# Patient Record
Sex: FEMALE | Race: WHITE | NOT HISPANIC OR LATINO | Employment: OTHER | URBAN - METROPOLITAN AREA
[De-identification: names, ages, dates, MRNs, and addresses within clinical notes are randomized per-mention and may not be internally consistent; named-entity substitution may affect disease eponyms.]

---

## 2017-03-03 LAB
ALBUMIN SERPL-MCNC: 4.5 G/DL (ref 3.6–5.1)
ALBUMIN/CREAT UR: 10 MCG/MG CREAT
ALBUMIN/GLOB SERPL: 1.9 (CALC) (ref 1–2.5)
ALP SERPL-CCNC: 63 U/L (ref 33–130)
ALT SERPL-CCNC: 16 U/L (ref 6–29)
AST SERPL-CCNC: 16 U/L (ref 10–35)
BILIRUB DIRECT SERPL-MCNC: 0.1 MG/DL
BILIRUB INDIRECT SERPL-MCNC: 0.3 MG/DL (CALC) (ref 0.2–1.2)
BILIRUB SERPL-MCNC: 0.4 MG/DL (ref 0.2–1.2)
CALCIUM SERPL-MCNC: 9.7 MG/DL (ref 8.6–10.4)
CHOLEST SERPL-MCNC: 200 MG/DL (ref 125–200)
CHOLEST/HDLC SERPL: 2.5 (CALC)
CREAT UR-MCNC: 201 MG/DL (ref 20–320)
GLOBULIN SER CALC-MCNC: 2.4 G/DL (CALC) (ref 1.9–3.7)
GLUCOSE P FAST SERPL-MCNC: 106 MG/DL (ref 65–99)
HBA1C MFR BLD: 5.8 % OF TOTAL HGB
HDLC SERPL-MCNC: 81 MG/DL
LDLC SERPL CALC-MCNC: 97 MG/DL (CALC)
MICROALBUMIN UR-MCNC: 2.1 MG/DL
NONHDLC SERPL-MCNC: 119 MG/DL (CALC)
PHOSPHATE SERPL-MCNC: 3.6 MG/DL (ref 2.1–4.3)
PROT SERPL-MCNC: 6.9 G/DL (ref 6.1–8.1)
PTH-INTACT SERPL-MCNC: 45 PG/ML (ref 14–64)
TRIGL SERPL-MCNC: 111 MG/DL

## 2017-04-27 ENCOUNTER — CONVERSION ENCOUNTER (OUTPATIENT)
Dept: MAMMOGRAPHY | Facility: CLINIC | Age: 77
End: 2017-04-27

## 2017-05-27 LAB
ALT SERPL-CCNC: 21 U/L (ref 6–29)
AST SERPL-CCNC: 22 U/L (ref 10–35)
CHOLEST SERPL-MCNC: 164 MG/DL (ref 125–200)
CHOLEST/HDLC SERPL: 1.9 (CALC)
HDLC SERPL-MCNC: 85 MG/DL
LDLC SERPL CALC-MCNC: 60 MG/DL (CALC)
NONHDLC SERPL-MCNC: 79 MG/DL (CALC)
TRIGL SERPL-MCNC: 96 MG/DL

## 2017-09-08 LAB
BUN SERPL-MCNC: 23 MG/DL (ref 7–25)
BUN/CREAT SERPL: 24 (CALC) (ref 6–22)
CALCIUM SERPL-MCNC: 9.7 MG/DL (ref 8.6–10.4)
CHLORIDE SERPL-SCNC: 102 MMOL/L (ref 98–110)
CO2 SERPL-SCNC: 29 MMOL/L (ref 20–31)
CREAT SERPL-MCNC: 0.95 MG/DL (ref 0.6–0.93)
GLUCOSE SERPL-MCNC: 100 MG/DL (ref 65–99)
HBA1C MFR BLD: 5.6 % OF TOTAL HGB
POTASSIUM SERPL-SCNC: 4.5 MMOL/L (ref 3.5–5.3)
SL AMB EGFR AFRICAN AMERICAN: 68 ML/MIN/1.73M2
SL AMB EGFR NON AFRICAN AMERICAN: 59 ML/MIN/1.73M2
SODIUM SERPL-SCNC: 139 MMOL/L (ref 135–146)

## 2017-12-01 LAB
ALBUMIN SERPL-MCNC: 4.3 G/DL (ref 3.6–5.1)
ALBUMIN/CREAT UR: 7 MCG/MG CREAT
ALBUMIN/GLOB SERPL: 1.9 (CALC) (ref 1–2.5)
ALP SERPL-CCNC: 59 U/L (ref 33–130)
ALT SERPL-CCNC: 19 U/L (ref 6–29)
APPEARANCE UR: CLEAR
AST SERPL-CCNC: 16 U/L (ref 10–35)
BILIRUB DIRECT SERPL-MCNC: 0.1 MG/DL
BILIRUB INDIRECT SERPL-MCNC: 0.4 MG/DL (CALC) (ref 0.2–1.2)
BILIRUB SERPL-MCNC: 0.5 MG/DL (ref 0.2–1.2)
BILIRUB UR QL STRIP: NEGATIVE
BUN SERPL-MCNC: 22 MG/DL (ref 7–25)
BUN/CREAT SERPL: ABNORMAL (CALC) (ref 6–22)
CALCIUM SERPL-MCNC: 9.4 MG/DL (ref 8.6–10.4)
CALCIUM SERPL-MCNC: 9.4 MG/DL (ref 8.6–10.4)
CHLORIDE SERPL-SCNC: 103 MMOL/L (ref 98–110)
CHOLEST SERPL-MCNC: 158 MG/DL
CHOLEST/HDLC SERPL: 2.1 (CALC)
CO2 SERPL-SCNC: 25 MMOL/L (ref 20–31)
COLOR UR: YELLOW
CREAT SERPL-MCNC: 0.93 MG/DL (ref 0.6–0.93)
CREAT UR-MCNC: 207 MG/DL (ref 20–320)
GLOBULIN SER CALC-MCNC: 2.3 G/DL (CALC) (ref 1.9–3.7)
GLUCOSE SERPL-MCNC: 104 MG/DL (ref 65–99)
GLUCOSE UR QL STRIP: NEGATIVE
HDLC SERPL-MCNC: 74 MG/DL
HGB UR QL STRIP: NEGATIVE
KETONES UR QL STRIP: NEGATIVE
LDLC SERPL CALC-MCNC: 65 MG/DL (CALC)
LEUKOCYTE ESTERASE UR QL STRIP: NEGATIVE
MAGNESIUM SERPL-MCNC: 2 MG/DL (ref 1.5–2.5)
MICROALBUMIN UR-MCNC: 1.5 MG/DL
NITRITE UR QL STRIP: NEGATIVE
NONHDLC SERPL-MCNC: 84 MG/DL (CALC)
PH UR STRIP: 5.5 [PH] (ref 5–8)
PHOSPHATE SERPL-MCNC: 3.5 MG/DL (ref 2.1–4.3)
POTASSIUM SERPL-SCNC: 4.5 MMOL/L (ref 3.5–5.3)
PROT SERPL-MCNC: 6.6 G/DL (ref 6.1–8.1)
PROT UR QL STRIP: NEGATIVE
PTH-INTACT SERPL-MCNC: 46 PG/ML (ref 14–64)
SERVICE CMNT-IMP: NORMAL
SL AMB EGFR AFRICAN AMERICAN: 69 ML/MIN/1.73M2
SL AMB EGFR NON AFRICAN AMERICAN: 60 ML/MIN/1.73M2
SODIUM SERPL-SCNC: 137 MMOL/L (ref 135–146)
SP GR UR STRIP: 1.02 (ref 1–1.03)
TRIGL SERPL-MCNC: 108 MG/DL

## 2018-04-14 LAB
EST. AVERAGE GLUCOSE BLD GHB EST-MCNC: 117 (CALC)
EST. AVERAGE GLUCOSE BLD GHB EST-SCNC: 6.5 (CALC)
GLUCOSE P FAST SERPL-MCNC: 103 MG/DL (ref 65–99)
HBA1C MFR BLD: 5.7 % OF TOTAL HGB

## 2018-05-09 ENCOUNTER — CONVERSION ENCOUNTER (OUTPATIENT)
Dept: MAMMOGRAPHY | Facility: CLINIC | Age: 78
End: 2018-05-09

## 2018-07-14 LAB
ALBUMIN SERPL-MCNC: 4.5 G/DL (ref 3.6–5.1)
ALBUMIN/GLOB SERPL: 2 (CALC) (ref 1–2.5)
ALP SERPL-CCNC: 56 U/L (ref 33–130)
ALT SERPL-CCNC: 15 U/L (ref 6–29)
AST SERPL-CCNC: 13 U/L (ref 10–35)
BASOPHILS # BLD AUTO: 50 CELLS/UL (ref 0–200)
BASOPHILS NFR BLD AUTO: 1.2 %
BILIRUB DIRECT SERPL-MCNC: 0.1 MG/DL
BILIRUB INDIRECT SERPL-MCNC: 0.3 MG/DL (CALC) (ref 0.2–1.2)
BILIRUB SERPL-MCNC: 0.4 MG/DL (ref 0.2–1.2)
BUN SERPL-MCNC: 31 MG/DL (ref 7–25)
BUN/CREAT SERPL: 33 (CALC) (ref 6–22)
CALCIUM SERPL-MCNC: 9.1 MG/DL (ref 8.6–10.4)
CHLORIDE SERPL-SCNC: 106 MMOL/L (ref 98–110)
CHOLEST SERPL-MCNC: 148 MG/DL
CHOLEST/HDLC SERPL: 1.9 (CALC)
CO2 SERPL-SCNC: 26 MMOL/L (ref 20–31)
CREAT SERPL-MCNC: 0.94 MG/DL (ref 0.6–0.93)
EOSINOPHIL # BLD AUTO: 139 CELLS/UL (ref 15–500)
EOSINOPHIL NFR BLD AUTO: 3.3 %
ERYTHROCYTE [DISTWIDTH] IN BLOOD BY AUTOMATED COUNT: 12.8 % (ref 11–15)
GLOBULIN SER CALC-MCNC: 2.2 G/DL (CALC) (ref 1.9–3.7)
GLUCOSE SERPL-MCNC: 112 MG/DL (ref 65–99)
HCT VFR BLD AUTO: 37.6 % (ref 35–45)
HDLC SERPL-MCNC: 76 MG/DL
HGB BLD-MCNC: 12.5 G/DL (ref 11.7–15.5)
LDLC SERPL CALC-MCNC: 52 MG/DL (CALC)
LYMPHOCYTES # BLD AUTO: 1000 CELLS/UL (ref 850–3900)
LYMPHOCYTES NFR BLD AUTO: 23.8 %
MCH RBC QN AUTO: 31.3 PG (ref 27–33)
MCHC RBC AUTO-ENTMCNC: 33.2 G/DL (ref 32–36)
MCV RBC AUTO: 94 FL (ref 80–100)
MONOCYTES # BLD AUTO: 462 CELLS/UL (ref 200–950)
MONOCYTES NFR BLD AUTO: 11 %
NEUTROPHILS # BLD AUTO: 2549 CELLS/UL (ref 1500–7800)
NEUTROPHILS NFR BLD AUTO: 60.7 %
NONHDLC SERPL-MCNC: 72 MG/DL (CALC)
PLATELET # BLD AUTO: 297 THOUSAND/UL (ref 140–400)
PMV BLD REES-ECKER: 10.5 FL (ref 7.5–12.5)
POTASSIUM SERPL-SCNC: 4.4 MMOL/L (ref 3.5–5.3)
PROT SERPL-MCNC: 6.7 G/DL (ref 6.1–8.1)
RBC # BLD AUTO: 4 MILLION/UL (ref 3.8–5.1)
SL AMB EGFR AFRICAN AMERICAN: 68 ML/MIN/1.73M2
SL AMB EGFR NON AFRICAN AMERICAN: 59 ML/MIN/1.73M2
SODIUM SERPL-SCNC: 139 MMOL/L (ref 135–146)
TRIGL SERPL-MCNC: 110 MG/DL
WBC # BLD AUTO: 4.2 THOUSAND/UL (ref 3.8–10.8)

## 2018-07-24 ENCOUNTER — OFFICE VISIT (OUTPATIENT)
Dept: FAMILY MEDICINE CLINIC | Facility: CLINIC | Age: 78
End: 2018-07-24
Payer: MEDICARE

## 2018-07-24 VITALS
DIASTOLIC BLOOD PRESSURE: 80 MMHG | OXYGEN SATURATION: 98 % | SYSTOLIC BLOOD PRESSURE: 120 MMHG | WEIGHT: 180.6 LBS | RESPIRATION RATE: 20 BRPM | HEIGHT: 65 IN | BODY MASS INDEX: 30.09 KG/M2 | HEART RATE: 90 BPM | TEMPERATURE: 97.7 F

## 2018-07-24 DIAGNOSIS — R09.89 WEAK ARTERIAL PULSE: ICD-10-CM

## 2018-07-24 DIAGNOSIS — N18.9 CHRONIC RENAL IMPAIRMENT, UNSPECIFIED CKD STAGE: ICD-10-CM

## 2018-07-24 DIAGNOSIS — E78.00 PURE HYPERCHOLESTEROLEMIA: ICD-10-CM

## 2018-07-24 DIAGNOSIS — M81.0 OSTEOPOROSIS WITHOUT CURRENT PATHOLOGICAL FRACTURE, UNSPECIFIED OSTEOPOROSIS TYPE: ICD-10-CM

## 2018-07-24 DIAGNOSIS — K76.89 LIVER CYST: Primary | ICD-10-CM

## 2018-07-24 DIAGNOSIS — N28.1 CYST OF RIGHT KIDNEY: ICD-10-CM

## 2018-07-24 PROBLEM — K76.0 FATTY LIVER: Status: ACTIVE | Noted: 2018-07-24

## 2018-07-24 PROBLEM — H40.9 GLAUCOMA: Status: ACTIVE | Noted: 2018-07-24

## 2018-07-24 PROBLEM — K63.5 COLONIC POLYP: Status: ACTIVE | Noted: 2018-07-24

## 2018-07-24 PROBLEM — K21.00 GERD WITH ESOPHAGITIS: Status: ACTIVE | Noted: 2018-07-24

## 2018-07-24 PROBLEM — H26.9 CATARACT: Status: ACTIVE | Noted: 2018-07-24

## 2018-07-24 PROBLEM — K57.90 DIVERTICULOSIS: Status: ACTIVE | Noted: 2018-07-24

## 2018-07-24 PROBLEM — E78.5 HYPERLIPIDEMIA: Status: ACTIVE | Noted: 2018-07-24

## 2018-07-24 PROBLEM — E11.9 DIABETES (HCC): Status: ACTIVE | Noted: 2018-07-24

## 2018-07-24 PROBLEM — I73.9 PERIPHERAL VASCULAR DISEASE OF LOWER EXTREMITY (HCC): Status: ACTIVE | Noted: 2018-07-24

## 2018-07-24 PROBLEM — H91.90 HEARING LOSS: Status: ACTIVE | Noted: 2018-07-24

## 2018-07-24 PROCEDURE — 99214 OFFICE O/P EST MOD 30 MIN: CPT | Performed by: FAMILY MEDICINE

## 2018-07-24 RX ORDER — ROSUVASTATIN CALCIUM 10 MG/1
10 TABLET, COATED ORAL DAILY
COMMUNITY
End: 2018-08-23 | Stop reason: SDUPTHER

## 2018-07-24 RX ORDER — DILTIAZEM HYDROCHLORIDE EXTENDED-RELEASE TABLETS 180 MG/1
1 TABLET, EXTENDED RELEASE ORAL DAILY
COMMUNITY
End: 2018-10-10 | Stop reason: SDUPTHER

## 2018-07-24 RX ORDER — OLMESARTAN MEDOXOMIL 20 MG/1
1 TABLET ORAL DAILY
COMMUNITY
End: 2019-01-24 | Stop reason: SDUPTHER

## 2018-07-24 RX ORDER — ASPIRIN 81 MG/1
81 TABLET ORAL DAILY
COMMUNITY
End: 2019-10-02 | Stop reason: SDUPTHER

## 2018-07-24 RX ORDER — EZETIMIBE 10 MG/1
10 TABLET ORAL DAILY
COMMUNITY
End: 2018-08-23 | Stop reason: SDUPTHER

## 2018-07-24 NOTE — PROGRESS NOTES
Assessment/Plan:      Diagnoses and all orders for this visit:    Liver cyst  -     US liver; Future    Osteoporosis without current pathological fracture, unspecified osteoporosis type  Comments:   fall prevention discussed  Calcium and vitamin-D supplement  discussed  Orders:  -     Vitamin D 25 hydroxy; Future    Chronic renal impairment, unspecified CKD stage  Comments:   advised patient to hydrate herself well  Avoid NSAID  Recommend to see Nephrology patient declined  Orders:  -     Basic metabolic panel; Future  -     PTH, intact; Future  -     Phosphorus; Future  -     US retroperitoneal complete; Future    Weak arterial pulse  -     VAS lower limb arterial duplex, complete bilateral; Future    Cyst of right kidney  -     US retroperitoneal complete; Future    Pure hypercholesterolemia  Comments: To follow with low-fat diet    Other orders  -     olmesartan (BENICAR) 20 mg tablet; Take 1 tablet by mouth daily  -     diltiazem (CARDIZEM LA) 180 MG 24 hr tablet; Take 1 tablet by mouth daily  -     brimonidine (ALPHAGAN P) 0 1 %; Apply 1 drop to eye every 12 (twelve) hours  -     bimatoprost (LUMIGAN) 0 01 % ophthalmic drops; Apply 1 drop to eye Daily  -     Multiple Vitamins-Minerals (CENTRUM SILVER ULTRA WOMENS PO); Take 1 tablet by mouth daily  -     Calcium Carbonate-Vitamin D (CALTRATE 600+D PO); Take 1 tablet by mouth daily  -     rosuvastatin (CRESTOR) 10 MG tablet; Take 10 mg by mouth daily  -     ezetimibe (ZETIA) 10 mg tablet; Take 10 mg by mouth daily  -     aspirin (ECOTRIN LOW STRENGTH) 81 mg EC tablet; Take 81 mg by mouth daily          Subjective:     Patient ID: Polo Cuello is a 69 y o  female    Diabetes  Patient does not check blood sugar at home  Denied polyuria or polydipsia  She saw her Ophthalmology recently and she has a slight cataracts,  Patient follow with Podiatry for foot    Hypertension    Denied headache/dizziness take medication without side effect , admit to regular salt intake   abnormal arterial Doppler of the lower extremities   she denied claudication  liver cyst   Denied abdominal pain   chronic renal failure  Patient denied edema problem with urination she said she does not drink enough fluid        Tests results , labs  7-13-18 , mammogramdiscussed with pt     medication  Allergies  Social history reviewed at this office        Review of Systems   Constitutional: Negative for activity change, appetite change, chills, fatigue, fever and unexpected weight change  HENT: Negative for congestion, ear pain, sinus pressure and sore throat  Eyes: Negative for visual disturbance  Respiratory: Negative for cough, chest tightness, shortness of breath and wheezing  Cardiovascular: Negative for chest pain, palpitations and leg swelling  Gastrointestinal: Negative for abdominal pain, blood in stool, constipation, diarrhea, nausea and vomiting  Genitourinary: Negative for dysuria, frequency, hematuria and urgency  Musculoskeletal: Negative for arthralgias, back pain, gait problem, joint swelling, myalgias and neck pain  Skin: Negative for rash  Neurological: Negative for dizziness, tremors, seizures, syncope, weakness, light-headedness and headaches  Hematological: Negative for adenopathy  Does not bruise/bleed easily  Psychiatric/Behavioral: Negative for behavioral problems, confusion, dysphoric mood and sleep disturbance  Objective:     Physical Exam   Constitutional: She is oriented to person, place, and time  She appears well-developed and well-nourished  HENT:   Head: Normocephalic  Eyes: No scleral icterus  Neck: Neck supple  No JVD present  No thyromegaly present  Cardiovascular: Normal rate and regular rhythm  No murmur heard  Pulses:       Dorsalis pedis pulses are 1+ on the right side, and 1+ on the left side  Posterior tibial pulses are 1+ on the right side, and 1+ on the left side     Carotid pulses are normal bilaterally   Pulmonary/Chest: Effort normal and breath sounds normal    Abdominal: Soft  Bowel sounds are normal  She exhibits no distension and no mass  There is no tenderness  There is no rebound and no guarding  Musculoskeletal: She exhibits no edema or tenderness  Feet:   Right Foot:   Skin Integrity: Positive for callus  Negative for ulcer, skin breakdown, warmth or dry skin  Left Foot:   Skin Integrity: Negative for skin breakdown, erythema, warmth or dry skin  Lymphadenopathy:     She has no cervical adenopathy  Neurological: She is alert and oriented to person, place, and time  No cranial nerve deficit  She exhibits normal muscle tone  Skin: No rash noted  No erythema  No pallor  Psychiatric: She has a normal mood and affect  Her behavior is normal  Judgment and thought content normal      Right Foot/Ankle   Right Foot Inspection  Skin Exam: callus and callus skin not intact, no dry skin, no warmth, no maceration, no abnormal color, no pre-ulcer and no ulcer                          Toe Exam: no swelling, no tenderness and erythema  Sensory       Monofilament testing: intact  Vascular    The right DP pulse is 1+  The right PT pulse is 1+  Left Foot/Ankle  Left Foot Inspection  Skin Exam: skin not intact, no dry skin, no warmth, no erythema, no maceration, normal color and no pre-ulcer                         Toe Exam: left toe deformityno swelling and no tenderness                   Sensory       Monofilament: intact  Vascular    The left DP pulse is 1+  The left PT pulse is 1+

## 2018-08-23 DIAGNOSIS — E78.5 HYPERLIPIDEMIA, UNSPECIFIED HYPERLIPIDEMIA TYPE: Primary | ICD-10-CM

## 2018-08-23 RX ORDER — EZETIMIBE 10 MG/1
10 TABLET ORAL DAILY
Qty: 90 TABLET | Refills: 0 | Status: SHIPPED | OUTPATIENT
Start: 2018-08-23 | End: 2018-10-25 | Stop reason: SDUPTHER

## 2018-08-23 RX ORDER — ROSUVASTATIN CALCIUM 10 MG/1
10 TABLET, COATED ORAL DAILY
Qty: 90 TABLET | Refills: 0 | Status: SHIPPED | OUTPATIENT
Start: 2018-08-23 | End: 2018-10-25 | Stop reason: SDUPTHER

## 2018-08-23 NOTE — TELEPHONE ENCOUNTER
Patient states that she going down to [place] and needs the medication to be sent ASAP for her to take with her to [place]

## 2018-09-13 ENCOUNTER — HOSPITAL ENCOUNTER (OUTPATIENT)
Dept: NON INVASIVE DIAGNOSTICS | Facility: HOSPITAL | Age: 78
Discharge: HOME | End: 2018-09-13
Payer: MEDICARE

## 2018-09-13 ENCOUNTER — HOSPITAL ENCOUNTER (OUTPATIENT)
Dept: ULTRASOUND IMAGING | Facility: HOSPITAL | Age: 78
Discharge: HOME | End: 2018-09-13
Payer: MEDICARE

## 2018-09-13 DIAGNOSIS — R09.89 WEAK ARTERIAL PULSE: ICD-10-CM

## 2018-09-13 DIAGNOSIS — K76.89 LIVER CYST: ICD-10-CM

## 2018-09-13 PROCEDURE — 93925 LOWER EXTREMITY STUDY: CPT | Performed by: SURGERY

## 2018-09-13 PROCEDURE — 93922 UPR/L XTREMITY ART 2 LEVELS: CPT | Performed by: SURGERY

## 2018-09-13 PROCEDURE — 93925 LOWER EXTREMITY STUDY: CPT

## 2018-09-13 PROCEDURE — 93923 UPR/LXTR ART STDY 3+ LVLS: CPT

## 2018-09-13 PROCEDURE — 76705 ECHO EXAM OF ABDOMEN: CPT

## 2018-10-10 DIAGNOSIS — I10 ESSENTIAL HYPERTENSION: Primary | ICD-10-CM

## 2018-10-10 RX ORDER — DILTIAZEM HYDROCHLORIDE EXTENDED-RELEASE TABLETS 180 MG/1
180 TABLET, EXTENDED RELEASE ORAL DAILY
Qty: 90 TABLET | Refills: 1 | Status: SHIPPED | OUTPATIENT
Start: 2018-10-10 | End: 2018-10-25

## 2018-10-19 LAB
25(OH)D3 SERPL-MCNC: 36 NG/ML (ref 30–100)
BUN SERPL-MCNC: 33 MG/DL (ref 7–25)
BUN/CREAT SERPL: 36 (CALC) (ref 6–22)
CALCIUM SERPL-MCNC: 9.1 MG/DL (ref 8.6–10.4)
CALCIUM SERPL-MCNC: 9.1 MG/DL (ref 8.6–10.4)
CHLORIDE SERPL-SCNC: 101 MMOL/L (ref 98–110)
CO2 SERPL-SCNC: 28 MMOL/L (ref 20–32)
CREAT SERPL-MCNC: 0.92 MG/DL (ref 0.6–0.93)
GLUCOSE SERPL-MCNC: 107 MG/DL (ref 65–99)
PHOSPHATE SERPL-MCNC: 3.8 MG/DL (ref 2.1–4.3)
POTASSIUM SERPL-SCNC: 4.4 MMOL/L (ref 3.5–5.3)
PTH-INTACT SERPL-MCNC: 63 PG/ML (ref 14–64)
SL AMB EGFR AFRICAN AMERICAN: 70 ML/MIN/1.73M2
SL AMB EGFR NON AFRICAN AMERICAN: 60 ML/MIN/1.73M2
SODIUM SERPL-SCNC: 137 MMOL/L (ref 135–146)

## 2018-10-25 ENCOUNTER — OFFICE VISIT (OUTPATIENT)
Dept: FAMILY MEDICINE CLINIC | Facility: CLINIC | Age: 78
End: 2018-10-25
Payer: MEDICARE

## 2018-10-25 VITALS
HEART RATE: 90 BPM | OXYGEN SATURATION: 99 % | RESPIRATION RATE: 20 BRPM | DIASTOLIC BLOOD PRESSURE: 80 MMHG | WEIGHT: 183.2 LBS | SYSTOLIC BLOOD PRESSURE: 130 MMHG | BODY MASS INDEX: 30.41 KG/M2 | TEMPERATURE: 97.5 F

## 2018-10-25 DIAGNOSIS — E78.5 HYPERLIPIDEMIA, UNSPECIFIED HYPERLIPIDEMIA TYPE: ICD-10-CM

## 2018-10-25 DIAGNOSIS — I10 ESSENTIAL HYPERTENSION: Primary | ICD-10-CM

## 2018-10-25 DIAGNOSIS — Z23 NEED FOR IMMUNIZATION AGAINST INFLUENZA: ICD-10-CM

## 2018-10-25 DIAGNOSIS — K76.89 LIVER CYST: ICD-10-CM

## 2018-10-25 DIAGNOSIS — N18.9 CHRONIC RENAL IMPAIRMENT, UNSPECIFIED CKD STAGE: ICD-10-CM

## 2018-10-25 DIAGNOSIS — N28.1 CYST OF RIGHT KIDNEY: ICD-10-CM

## 2018-10-25 DIAGNOSIS — E78.00 PURE HYPERCHOLESTEROLEMIA: ICD-10-CM

## 2018-10-25 DIAGNOSIS — I73.9 PERIPHERAL VASCULAR DISEASE OF LOWER EXTREMITY (HCC): ICD-10-CM

## 2018-10-25 DIAGNOSIS — E11.69 TYPE 2 DIABETES MELLITUS WITH OTHER SPECIFIED COMPLICATION, WITHOUT LONG-TERM CURRENT USE OF INSULIN (HCC): ICD-10-CM

## 2018-10-25 PROCEDURE — 90662 IIV NO PRSV INCREASED AG IM: CPT | Performed by: FAMILY MEDICINE

## 2018-10-25 PROCEDURE — G0008 ADMIN INFLUENZA VIRUS VAC: HCPCS | Performed by: FAMILY MEDICINE

## 2018-10-25 PROCEDURE — 99214 OFFICE O/P EST MOD 30 MIN: CPT | Performed by: FAMILY MEDICINE

## 2018-10-25 RX ORDER — DILTIAZEM HYDROCHLORIDE 180 MG/1
180 CAPSULE, COATED, EXTENDED RELEASE ORAL DAILY
COMMUNITY
End: 2019-04-03

## 2018-10-25 NOTE — PROGRESS NOTES
Assessment/Plan:     Problem List Items Addressed This Visit     Hyperlipidemia    Relevant Orders    Hepatic function panel    Lipid Panel with Direct LDL reflex    Diabetes ([facility] [address] )    Relevant Orders    Hemoglobin N9W    Basic metabolic panel    Microalbumin / creatinine urine ratio    Chronic renal insufficiency    Peripheral vascular disease of lower extremity (HCC)    Cyst of right kidney    Liver cyst      Other Visit Diagnoses     Essential hypertension    -  Primary     not well controlled  Advised to follow with the dash diet  Relevant Medications    diltiazem (CARDIZEM CD) 180 mg 24 hr capsule    Need for immunization against influenza        Relevant Orders    influenza vaccine, 0299-3427, high-dose, PF 0 5 mL, for patients 65 yr+ (FLUZONE HIGH-DOSE) (Completed)           Diagnoses and all orders for this visit:    Essential hypertension  Comments:   not well controlled  Advised to follow with the dash diet  Pure hypercholesterolemia  Comments: To follow with low-fat diet  Orders:  -     Hepatic function panel; Future  -     Lipid Panel with Direct LDL reflex; Future    Type 2 diabetes mellitus with other specified complication, without long-term current use of insulin (HCC)  Comments: To follow with 1800 calorie diet  To see Podiatry for foot care  Orders:  -     Hemoglobin A1C; Future  -     Basic metabolic panel; Future  -     Microalbumin / creatinine urine ratio    Liver cyst  Comments:   liver cysts  stable    Cyst of right kidney  Comments:   right renal cysts stable    Peripheral vascular disease of lower extremity (HCC)  Comments:   patient to call if she developed claudication  Repeat arterial Doppler of the lower extremities in 1 year    Chronic renal impairment, unspecified CKD stage  Comments:   avoid NSAID    Hyperlipidemia, unspecified hyperlipidemia type  Comments: To follow with low-fat diet  Orders:  -     ezetimibe (ZETIA) 10 mg tablet;  Take 1 tablet (10 mg total) by mouth daily  -     rosuvastatin (CRESTOR) 10 MG tablet; Take 1 tablet (10 mg total) by mouth daily    Need for immunization against influenza  -     influenza vaccine, [id number], high-dose, PF 0 5 mL, for patients 65 yr+ (FLUZONE HIGH-DOSE)    Other orders  -     diltiazem (CARDIZEM CD) 180 mg 24 hr capsule; Take 180 mg by mouth daily            Subjective:     Patient ID: Jenni Gutierrez is a 69 y o  female       Patient is here for follow-up on her chronic medical problem  Diabetes  Denied polyuria or polydipsia  Does not watch her diet  Does not check her blood sugar at home  She did see her Ophthalmology for routine eye care  Hypertension  Admit to regular salt intake  Denied headache or dizziness  Hyperlipidemia admit to regular fat intake  Peripheral vascular disease denied claudication  Chronic renal insufficiency  Denied edema or problem with urination    Test results  Renal ultrasound not done  Liver ultrasound  Arterial Doppler of the lower extremities  Lab done on October 8, 2018   All discussed with patient        Review of Systems   Constitutional: Negative for activity change, appetite change, chills, fatigue, fever and unexpected weight change  HENT: Negative for congestion, ear pain, sinus pressure and sore throat  Eyes: Negative for visual disturbance  Respiratory: Negative for cough, chest tightness, shortness of breath and wheezing  Cardiovascular: Negative for chest pain, palpitations and leg swelling  Gastrointestinal: Negative for abdominal pain, blood in stool, constipation, diarrhea, nausea and vomiting  Endocrine: Negative for cold intolerance and heat intolerance  Genitourinary: Negative for dysuria, frequency, hematuria and urgency  Musculoskeletal: Negative for arthralgias, back pain, gait problem, joint swelling, myalgias and neck pain  Skin: Negative for rash     Neurological: Negative for dizziness, tremors, seizures, syncope, weakness, light-headedness and headaches  Hematological: Negative for adenopathy  Does not bruise/bleed easily  Psychiatric/Behavioral: Negative for behavioral problems, confusion, dysphoric mood and sleep disturbance  Objective:     Physical Exam   Constitutional: She appears well-developed and well-nourished  HENT:   Head: Normocephalic  Eyes: Pupils are equal, round, and reactive to light  No scleral icterus  Cardiovascular: Regular rhythm  Pulses are weak pulses  Pulses:       Dorsalis pedis pulses are 2+ on the right side, and 1+ on the left side  Posterior tibial pulses are 1+ on the right side, and 1+ on the left side  Pulmonary/Chest: Effort normal and breath sounds normal    Abdominal: Bowel sounds are normal  She exhibits no mass  There is no tenderness  Musculoskeletal: She exhibits no edema  Feet:   Right Foot:   Skin Integrity: Positive for callus  Negative for ulcer, skin breakdown, warmth or dry skin  Left Foot:   Skin Integrity: Negative for ulcer, skin breakdown, erythema, warmth, callus or dry skin  Lymphadenopathy:     She has no cervical adenopathy  Neurological: Coordination normal    Skin: No rash noted  Right Foot/Ankle   Right Foot Inspection  Skin Exam: skin normal, callus and callus skin not intact, no dry skin, no warmth, no maceration, no abnormal color, no pre-ulcer and no ulcer                          Toe Exam: no swelling, no tenderness and  no right toe deformity  Sensory   Vibration: intact  Proprioception: intact   Monofilament testing: intact  Vascular    The right DP pulse is 2+  The right PT pulse is 1+  Left Foot/Ankle  Left Foot Inspection  Skin Exam: skin normalskin not intact, no dry skin, no warmth, no erythema, no maceration, normal color, no pre-ulcer, no ulcer and no callus                         Toe Exam: left toe deformityno swelling, no tenderness and no erythema                     Vascular    The left DP pulse is 1+   The left PT pulse is 1+  Assign Risk Category:  Deformity present;  No loss of protective sensation; Weak pulses       Risk: 1

## 2018-10-26 RX ORDER — ROSUVASTATIN CALCIUM 10 MG/1
10 TABLET, COATED ORAL DAILY
Qty: 90 TABLET | Refills: 3 | Status: SHIPPED | OUTPATIENT
Start: 2018-10-26 | End: 2019-07-31 | Stop reason: SDUPTHER

## 2018-10-26 RX ORDER — EZETIMIBE 10 MG/1
10 TABLET ORAL DAILY
Qty: 90 TABLET | Refills: 3 | Status: SHIPPED | OUTPATIENT
Start: 2018-10-26 | End: 2019-07-31 | Stop reason: SDUPTHER

## 2019-01-17 LAB
ALBUMIN SERPL-MCNC: 4.3 G/DL (ref 3.6–5.1)
ALBUMIN/CREAT UR: 11 MCG/MG CREAT
ALBUMIN/GLOB SERPL: 1.7 (CALC) (ref 1–2.5)
ALP SERPL-CCNC: 56 U/L (ref 33–130)
ALT SERPL-CCNC: 16 U/L (ref 6–29)
AST SERPL-CCNC: 14 U/L (ref 10–35)
BILIRUB DIRECT SERPL-MCNC: 0.1 MG/DL
BILIRUB INDIRECT SERPL-MCNC: 0.4 MG/DL (CALC) (ref 0.2–1.2)
BILIRUB SERPL-MCNC: 0.5 MG/DL (ref 0.2–1.2)
BUN SERPL-MCNC: 31 MG/DL (ref 7–25)
BUN/CREAT SERPL: 36 (CALC) (ref 6–22)
CALCIUM SERPL-MCNC: 9.2 MG/DL (ref 8.6–10.4)
CHLORIDE SERPL-SCNC: 103 MMOL/L (ref 98–110)
CHOLEST SERPL-MCNC: 169 MG/DL
CHOLEST/HDLC SERPL: 2.2 (CALC)
CO2 SERPL-SCNC: 26 MMOL/L (ref 20–32)
CREAT SERPL-MCNC: 0.86 MG/DL (ref 0.6–0.93)
CREAT UR-MCNC: 168 MG/DL (ref 20–275)
GLOBULIN SER CALC-MCNC: 2.6 G/DL (CALC) (ref 1.9–3.7)
GLUCOSE SERPL-MCNC: 103 MG/DL (ref 65–99)
HBA1C MFR BLD: 5.7 % OF TOTAL HGB
HDLC SERPL-MCNC: 78 MG/DL
LDLC SERPL CALC-MCNC: 72 MG/DL (CALC)
MICROALBUMIN UR-MCNC: 1.8 MG/DL
NONHDLC SERPL-MCNC: 91 MG/DL (CALC)
POTASSIUM SERPL-SCNC: 4.4 MMOL/L (ref 3.5–5.3)
PROT SERPL-MCNC: 6.9 G/DL (ref 6.1–8.1)
SL AMB EGFR AFRICAN AMERICAN: 76 ML/MIN/1.73M2
SL AMB EGFR NON AFRICAN AMERICAN: 65 ML/MIN/1.73M2
SODIUM SERPL-SCNC: 137 MMOL/L (ref 135–146)
TRIGL SERPL-MCNC: 107 MG/DL

## 2019-01-24 ENCOUNTER — OFFICE VISIT (OUTPATIENT)
Dept: FAMILY MEDICINE CLINIC | Facility: CLINIC | Age: 79
End: 2019-01-24
Payer: MEDICARE

## 2019-01-24 VITALS
HEART RATE: 93 BPM | BODY MASS INDEX: 30.01 KG/M2 | SYSTOLIC BLOOD PRESSURE: 114 MMHG | WEIGHT: 180.8 LBS | DIASTOLIC BLOOD PRESSURE: 80 MMHG | TEMPERATURE: 97.4 F | OXYGEN SATURATION: 98 % | RESPIRATION RATE: 20 BRPM

## 2019-01-24 DIAGNOSIS — E78.5 HYPERLIPIDEMIA, UNSPECIFIED HYPERLIPIDEMIA TYPE: ICD-10-CM

## 2019-01-24 DIAGNOSIS — E11.69 TYPE 2 DIABETES MELLITUS WITH OTHER SPECIFIED COMPLICATION, WITHOUT LONG-TERM CURRENT USE OF INSULIN (HCC): ICD-10-CM

## 2019-01-24 DIAGNOSIS — K76.89 LIVER CYST: ICD-10-CM

## 2019-01-24 DIAGNOSIS — Q61.02 MULTIPLE RENAL CYSTS: ICD-10-CM

## 2019-01-24 DIAGNOSIS — I73.9 PERIPHERAL VASCULAR DISEASE (HCC): ICD-10-CM

## 2019-01-24 DIAGNOSIS — I10 ESSENTIAL HYPERTENSION: ICD-10-CM

## 2019-01-24 DIAGNOSIS — Z00.00 MEDICARE ANNUAL WELLNESS VISIT, SUBSEQUENT: Primary | ICD-10-CM

## 2019-01-24 DIAGNOSIS — N18.9 CHRONIC RENAL IMPAIRMENT, UNSPECIFIED CKD STAGE: ICD-10-CM

## 2019-01-24 PROCEDURE — 99214 OFFICE O/P EST MOD 30 MIN: CPT | Performed by: FAMILY MEDICINE

## 2019-01-24 PROCEDURE — G0439 PPPS, SUBSEQ VISIT: HCPCS | Performed by: FAMILY MEDICINE

## 2019-01-24 RX ORDER — OLMESARTAN MEDOXOMIL 20 MG/1
20 TABLET ORAL DAILY
Qty: 90 TABLET | Refills: 3 | Status: SHIPPED | OUTPATIENT
Start: 2019-01-24 | End: 2020-01-08 | Stop reason: SDUPTHER

## 2019-01-24 NOTE — PROGRESS NOTES
Assessment and Plan:    Problem List Items Addressed This Visit     Hyperlipidemia    Diabetes ([facility] [address] )    Chronic renal insufficiency    Cyst of right kidney    Liver cyst      Other Visit Diagnoses     Medicare annual wellness visit, subsequent    -  Primary    Essential hypertension        Controlled  To follow with the dash    Relevant Medications    olmesartan (BENICAR) 20 mg tablet    Other Relevant Orders    ECG 12 lead    Peripheral vascular disease (HCC)        Check arterial Doppler of the lower extremities in 1 year        Health Maintenance Due   Topic Date Due    Medicare Annual Wellness Visit (AWV)  1940    DM Eye Exam  10/06/1950    DTaP,Tdap,and Td Vaccines (1 - Tdap) 10/06/1962         HPI:  Royal Villegas is a 69 y o  female here for her Subsequent Wellness Visit      Patient Active Problem List   Diagnosis    Hyperlipidemia    Diabetes ([facility] [address] )    Chronic renal insufficiency    Fatty liver    Osteoporosis    Diverticulosis    Peripheral vascular disease of lower extremity (HCC)    Cyst of right kidney    Liver cyst    Glaucoma    Cataract    Colonic polyp    Hearing loss    GERD with esophagitis     Past Medical History:   Diagnosis Date    Hypertension      Past Surgical History:   Procedure Laterality Date    TUBAL LIGATION Bilateral      Family History   Problem Relation Age of Onset    Breast cancer Mother     Heart attack Father     Coronary artery disease Father     Colon polyps Brother      History   Smoking Status    Former Smoker    Types: Cigarettes   Smokeless Tobacco    Former User     History   Alcohol Use    Yes     Comment: ocassionally      History   Drug Use No       Current Outpatient Prescriptions   Medication Sig Dispense Refill    aspirin (ECOTRIN LOW STRENGTH) 81 mg EC tablet Take 81 mg by mouth daily      bimatoprost (LUMIGAN) 0 01 % ophthalmic drops Apply 1 drop to eye Daily      brimonidine (ALPHAGAN P) 0 1 % Apply 1 drop to eye every 12 (twelve) hours      Calcium Carbonate-Vitamin D (CALTRATE 600+D PO) Take 1 tablet by mouth daily      diltiazem (CARDIZEM CD) 180 mg 24 hr capsule Take 180 mg by mouth daily      ezetimibe (ZETIA) 10 mg tablet Take 1 tablet (10 mg total) by mouth daily 90 tablet 3    Multiple Vitamins-Minerals (CENTRUM SILVER ULTRA WOMENS PO) Take 1 tablet by mouth daily      olmesartan (BENICAR) 20 mg tablet Take 1 tablet (20 mg total) by mouth daily 90 tablet 3    rosuvastatin (CRESTOR) 10 MG tablet Take 1 tablet (10 mg total) by mouth daily 90 tablet 3     No current facility-administered medications for this visit  Allergies   Allergen Reactions    Nsaids      Other reaction(s): renal dysfunction  Category: Adverse Reaction;     Sulfa Antibiotics Hives    Amoxicillin Fever and Rash     Category: Allergy;      Immunization History   Administered Date(s) Administered    Influenza 10/13/2015, 09/22/2016, 09/14/2017    Influenza, high dose seasonal 0 5 mL 10/25/2018    Pneumococcal Conjugate 13-Valent 09/14/2017    Pneumococcal Polysaccharide PPV23 11/18/2015       Patient Care Team:  Nirmal Miles MD as PCP - General  MD Oswaldo Whitlock MD    Medicare Screening Tests and Risk Assessments:  Rohan Campo is here for her Subsequent Wellness visit  Health Risk Assessment:  Patient rates overall health as good  Patient feels that their physical health rating is Same  Eyesight was rated as Same  Hearing was rated as Same  Patient feels that their emotional and mental health rating is Same  Pain experienced by patient in the last 7 days has been Some  Patient's pain rating has been 2/10  Patient states that she has experienced no weight loss or gain in last 6 months  Emotional/Mental Health:  Patient has been feeling nervous/anxious  PHQ-9 Depression Screening:    Frequency of the following problems over the past two weeks:      1  Little interest or pleasure in doing things: 0 - not at all      2  Feeling down, depressed, or hopeless: 0 - not at all  PHQ-2 Score: 0          Broken Bones/Falls: Fall Risk Assessment:    In the past year, patient has experienced: No history of falling in past year          Bladder/Bowel:  Patient has not leaked urine accidently in the last six months  Patient reports no loss of bowel control  Immunizations:  Patient has had a flu vaccination within the last year  Patient has received a pneumonia shot  Patient has received a shingles shot  Patient has received tetanus/diphtheria shot  Date of tetanus/diphtheria shot: 8/7/2009(Additional Comments: Shingles- [facility] 4/24/12)    Home Safety:  Patient does not have trouble with stairs inside or outside of their home  Patient currently reports that there are no safety hazards present in home, working smoke alarms, working carbon monoxide detectors  Preventative Screenings:   Breast cancer screening performed, 5/9/2018  colon cancer screen completed, 7/12/2017  cholesterol screen completed, 1/16/2019  glaucoma eye exam completed, (Additional Comments: Pt follows with Opthamalogist every 3 months  She has an upcoming appt on Monday 1/28/19)    Nutrition:  Current diet: Regular and Limited junk food with servings of the following:    Medications:  Patient is currently taking over-the-counter supplements  List of OTC medications includes: multivitamin  Patient is able to manage medications  Lifestyle Choices:  Patient reports no tobacco use  Patient has smoked or used tobacco in the past   Patient has stopped her tobacco use  Tobacco use quit date: 10/20/2003  Patient reports alcohol use  Alcohol use per week: a couple beers on the weekend or a glass of wine during the week if a friend comes over  Patient drives a vehicle  Patient wears seat belt      Current level of exercise of physical activity described by patient as: pt does a lot of shopping and running around for errands, but does not go to the gym because of her back  Activities of Daily Living:  Can get out of bed by his or her self, able to dress self, able to make own meals, able to do own shopping, able to bathe self, can do own laundry/housekeeping, can manage own money, pay bills and track expenses    Previous Hospitalizations:  No hospitalization or ED visit in past 12 months        Advanced Directives:  Patient has decided on a power of   Patient has spoken to designated power of   Patient has completed advanced directive  Preventative Screening/Counseling:      Cardiovascular:      General: Risks and Benefits Discussed      Counseling: Healthy Diet     Due for Labs/Analytes/Optional EKG: echocardiogram          Diabetes:      Counseling: Healthy Weight and Healthy Diet      Comments: Patient is diabetic  Well controlled        Colorectal Cancer:      General: Screening Current      Comments: Patient had colonoscopy July 2017        Breast Cancer:      General: Screening Current      Comments: Patient had mammogram May 2018  Patient was advised to have manual breast exam        Cervical Cancer:      General: Risks and Benefits Discussed and Patient Declines          Osteoporosis:      General: Screening Current      Comments: Patient had bone density April 2017        AAA:      General: Patient Declines          Glaucoma:      General: Screening Current      Comments: Patient has a glaucoma she see her ophthalmologist periodically        HIV:      General: Patient Declines and Risks and Benefits Discussed          Hepatitis C:      General: Patient Declines and Risks and Benefits Discussed        Advanced Directives:   Patient has living will for healthcare, patient has an advanced directive       Immunizations:      Influenza: Influenza UTD This Year      Pneumococcal: Lifetime Vaccine Completed      Shingrix: Risks & Benefits Discussed and Patient Declines  Additional Comments: Patient had Tdap 8/ 7/ 2009  Had Zostavax 2012    Other Preventative Counseling (Non-Medicare):   Fall Prevention, Car/seat belt/driving safety reviewed, Skin self-exam and Sunscreen use

## 2019-01-24 NOTE — PROGRESS NOTES
Assessment/Plan:          Diagnoses and all orders for this visit:    Medicare annual wellness visit, subsequent    Hyperlipidemia, unspecified hyperlipidemia type  Comments: To follow with low-fat diet    Essential hypertension  Comments:  Controlled  To follow with the dash  Orders:  -     olmesartan (BENICAR) 20 mg tablet; Take 1 tablet (20 mg total) by mouth daily  -     ECG 12 lead; Future    Type 2 diabetes mellitus with other specified complication, without long-term current use of insulin ([facility] [address] )  Comments:  Patient to follow with 1800 calorie diet  To follow with Ophthalmology for eye care and with Podiatry for    Peripheral vascular disease [facility])  Comments:  Check arterial Doppler of the lower extremities in 1 year    Chronic renal impairment, unspecified CKD stage  Comments:  Recent kidney test is okay  To increase fluid intake and avoid NSAID    Multiple renal cysts  Comments:  Right kidney, recheck ultrasound of the kidney in 1 year    Liver cyst  Comments:  Stable  Check ultrasound of the liver in 1 yea5r            Subjective:     Patient ID: Estella Smallwood is a 69 y o  female        Patient is here for follow-up on her chronic medical problem  Hypertension  Add admit to regular salt intake  Denied headache or dizziness  Diabetes  Denied polyuria or polydipsia  Admit to regular sweet carbohydrate intake  She does follow with Ophthalmology on a regular basis because she has glaucoma  Denied change in vision  Denied sign and symptom of hypoglycemia  Chronic renal failure  Denied edema or problem with urination  Hyperlipidemia denied side effect with the medication admit to regular diet  Denied skin lesion secondary to hyperlipidemia or chest pain  Liver cyst denied abdominal pain  Test results    Lab January 16, 2018  Arterial Doppler of the lower extremities  Liver ultrasound  Renal ultrasound    All discussed with patient        Review of Systems   Constitutional: Negative for activity change, appetite change, chills, fatigue, fever and unexpected weight change  HENT: Positive for hearing loss  Negative for congestion, ear pain, sinus pressure and sore throat  Patient with  chronic hearing loss   Eyes: Negative for visual disturbance  Respiratory: Negative for cough, chest tightness, shortness of breath and wheezing  Cardiovascular: Negative for chest pain, palpitations and leg swelling  Gastrointestinal: Negative for abdominal pain, blood in stool, constipation, diarrhea, nausea and vomiting  Genitourinary: Negative for dysuria, frequency, hematuria and urgency  Musculoskeletal: Negative for arthralgias, back pain, gait problem, joint swelling, myalgias and neck pain  Skin: Negative for rash  Neurological: Negative for dizziness, tremors, seizures, syncope, weakness, light-headedness and headaches  Hematological: Negative for adenopathy  Does not bruise/bleed easily  Psychiatric/Behavioral: Negative for behavioral problems, confusion, dysphoric mood and sleep disturbance  Objective:     Physical Exam   Constitutional: She is oriented to person, place, and time  She appears well-developed and well-nourished  No distress  HENT:   Head: Normocephalic and atraumatic  Eyes: Pupils are equal, round, and reactive to light  Conjunctivae and EOM are normal  No scleral icterus  Neck: No JVD present  No thyromegaly present  Cardiovascular: Normal rate, regular rhythm and normal heart sounds  No murmur heard  Extremities  No edema   Pulmonary/Chest: Effort normal and breath sounds normal    Abdominal: Soft  Bowel sounds are normal  She exhibits no distension and no mass  There is no tenderness  There is no rebound and no guarding  No hernia  Lymphadenopathy:     She has no cervical adenopathy  Neurological: She is alert and oriented to person, place, and time  No cranial nerve deficit  She exhibits normal muscle tone   Coordination normal    Skin: No rash noted  Psychiatric: She has a normal mood and affect   Her behavior is normal  Judgment and thought content normal

## 2019-04-03 DIAGNOSIS — I10 ESSENTIAL HYPERTENSION: ICD-10-CM

## 2019-04-03 RX ORDER — DILTIAZEM HYDROCHLORIDE EXTENDED-RELEASE TABLETS 180 MG/1
TABLET, EXTENDED RELEASE ORAL
Qty: 90 TABLET | Refills: 1 | Status: SHIPPED | OUTPATIENT
Start: 2019-04-03 | End: 2019-04-25 | Stop reason: SDUPTHER

## 2019-04-25 ENCOUNTER — OFFICE VISIT (OUTPATIENT)
Dept: FAMILY MEDICINE CLINIC | Facility: CLINIC | Age: 79
End: 2019-04-25
Payer: MEDICARE

## 2019-04-25 VITALS
HEART RATE: 79 BPM | SYSTOLIC BLOOD PRESSURE: 128 MMHG | WEIGHT: 180 LBS | OXYGEN SATURATION: 99 % | BODY MASS INDEX: 29.99 KG/M2 | TEMPERATURE: 98.6 F | HEIGHT: 65 IN | DIASTOLIC BLOOD PRESSURE: 78 MMHG

## 2019-04-25 DIAGNOSIS — Z12.31 ENCOUNTER FOR SCREENING MAMMOGRAM FOR BREAST CANCER: ICD-10-CM

## 2019-04-25 DIAGNOSIS — E66.3 OVER WEIGHT: ICD-10-CM

## 2019-04-25 DIAGNOSIS — E11.69 TYPE 2 DIABETES MELLITUS WITH OTHER SPECIFIED COMPLICATION, WITHOUT LONG-TERM CURRENT USE OF INSULIN (HCC): ICD-10-CM

## 2019-04-25 DIAGNOSIS — I73.9 PERIPHERAL VASCULAR DISEASE (HCC): Primary | ICD-10-CM

## 2019-04-25 DIAGNOSIS — I10 ESSENTIAL HYPERTENSION: ICD-10-CM

## 2019-04-25 DIAGNOSIS — E78.00 PURE HYPERCHOLESTEROLEMIA: ICD-10-CM

## 2019-04-25 PROBLEM — Q61.02 MULTIPLE RENAL CYSTS: Status: ACTIVE | Noted: 2018-07-24

## 2019-04-25 PROCEDURE — 99214 OFFICE O/P EST MOD 30 MIN: CPT | Performed by: FAMILY MEDICINE

## 2019-04-25 RX ORDER — DILTIAZEM HYDROCHLORIDE EXTENDED-RELEASE TABLETS 180 MG/1
180 TABLET, EXTENDED RELEASE ORAL DAILY
Qty: 90 TABLET | Refills: 1 | Status: SHIPPED | OUTPATIENT
Start: 2019-04-25 | End: 2019-07-31 | Stop reason: SDUPTHER

## 2019-04-26 PROBLEM — E66.3 OVER WEIGHT: Status: ACTIVE | Noted: 2019-04-26

## 2019-04-26 PROBLEM — Z12.31 ENCOUNTER FOR SCREENING MAMMOGRAM FOR BREAST CANCER: Status: ACTIVE | Noted: 2019-04-26

## 2019-05-10 ENCOUNTER — HOSPITAL ENCOUNTER (OUTPATIENT)
Dept: MAMMOGRAPHY | Facility: CLINIC | Age: 79
Discharge: HOME | End: 2019-05-10
Payer: MEDICARE

## 2019-05-10 VITALS — HEIGHT: 65 IN | BODY MASS INDEX: 29.99 KG/M2 | WEIGHT: 180 LBS

## 2019-05-10 DIAGNOSIS — Z12.31 ENCOUNTER FOR SCREENING MAMMOGRAM FOR BREAST CANCER: ICD-10-CM

## 2019-05-10 PROCEDURE — 77067 SCR MAMMO BI INCL CAD: CPT

## 2019-07-16 LAB
CREAT ?TM UR-SCNC: 117 UMOL/L
EXT MICROALBUMIN URINE RANDOM: 1.2
HBA1C MFR BLD HPLC: 6.1 %
MICROALBUMIN/CREAT UR: 10.3 MG/G{CREAT}

## 2019-07-31 ENCOUNTER — OFFICE VISIT (OUTPATIENT)
Dept: FAMILY MEDICINE CLINIC | Facility: CLINIC | Age: 79
End: 2019-07-31
Payer: MEDICARE

## 2019-07-31 VITALS
SYSTOLIC BLOOD PRESSURE: 144 MMHG | HEIGHT: 65 IN | HEART RATE: 82 BPM | DIASTOLIC BLOOD PRESSURE: 92 MMHG | WEIGHT: 180.5 LBS | RESPIRATION RATE: 16 BRPM | BODY MASS INDEX: 30.07 KG/M2 | OXYGEN SATURATION: 96 % | TEMPERATURE: 98.1 F

## 2019-07-31 DIAGNOSIS — N18.9 CHRONIC RENAL IMPAIRMENT, UNSPECIFIED CKD STAGE: ICD-10-CM

## 2019-07-31 DIAGNOSIS — E11.69 TYPE 2 DIABETES MELLITUS WITH OTHER SPECIFIED COMPLICATION, WITHOUT LONG-TERM CURRENT USE OF INSULIN (HCC): ICD-10-CM

## 2019-07-31 DIAGNOSIS — R19.7 DIARRHEA, UNSPECIFIED TYPE: Primary | ICD-10-CM

## 2019-07-31 DIAGNOSIS — M81.0 OSTEOPOROSIS WITHOUT CURRENT PATHOLOGICAL FRACTURE, UNSPECIFIED OSTEOPOROSIS TYPE: ICD-10-CM

## 2019-07-31 DIAGNOSIS — E78.5 HYPERLIPIDEMIA, UNSPECIFIED HYPERLIPIDEMIA TYPE: ICD-10-CM

## 2019-07-31 DIAGNOSIS — I73.9 PERIPHERAL VASCULAR DISEASE OF LOWER EXTREMITY (HCC): ICD-10-CM

## 2019-07-31 DIAGNOSIS — E78.00 PURE HYPERCHOLESTEROLEMIA: ICD-10-CM

## 2019-07-31 DIAGNOSIS — N28.1 CYST OF RIGHT KIDNEY: ICD-10-CM

## 2019-07-31 DIAGNOSIS — K76.89 LIVER CYST: ICD-10-CM

## 2019-07-31 DIAGNOSIS — I10 ESSENTIAL HYPERTENSION: ICD-10-CM

## 2019-07-31 PROCEDURE — 99214 OFFICE O/P EST MOD 30 MIN: CPT | Performed by: FAMILY MEDICINE

## 2019-07-31 PROCEDURE — 93000 ELECTROCARDIOGRAM COMPLETE: CPT | Performed by: FAMILY MEDICINE

## 2019-07-31 RX ORDER — EZETIMIBE 10 MG/1
10 TABLET ORAL DAILY
Qty: 90 TABLET | Refills: 3 | Status: SHIPPED | OUTPATIENT
Start: 2019-07-31 | End: 2020-10-14 | Stop reason: SDUPTHER

## 2019-07-31 RX ORDER — ROSUVASTATIN CALCIUM 10 MG/1
10 TABLET, COATED ORAL DAILY
Qty: 90 TABLET | Refills: 3 | Status: SHIPPED | OUTPATIENT
Start: 2019-07-31 | End: 2020-10-14 | Stop reason: SDUPTHER

## 2019-07-31 RX ORDER — DILTIAZEM HYDROCHLORIDE EXTENDED-RELEASE TABLETS 180 MG/1
180 TABLET, EXTENDED RELEASE ORAL DAILY
Qty: 90 TABLET | Refills: 1 | Status: SHIPPED | OUTPATIENT
Start: 2019-07-31 | End: 2019-10-17

## 2019-07-31 NOTE — PROGRESS NOTES
Assessment/Plan:          Diagnoses and all orders for this visit:    Diarrhea, unspecified type  Comments:   improved  Decrease fruit and vegetable intake  call if any further problem    Essential hypertension  Comments:  Controlled at home   at this office not well controlled patient is slightly stressed out about her  new hearing aid  to follow up with the dash diet  Orders:  -     diltiazem (CARDIZEM LA) 180 MG 24 hr tablet; Take 1 tablet (180 mg total) by mouth daily  -     POCT ECG    Hyperlipidemia, unspecified hyperlipidemia type  Comments: To follow with low-fat diet  Orders:  -     ezetimibe (ZETIA) 10 mg tablet; Take 1 tablet (10 mg total) by mouth daily  -     rosuvastatin (CRESTOR) 10 MG tablet; Take 1 tablet (10 mg total) by mouth daily    Pure hypercholesterolemia  Comments:   to follow with low-fat diet    Type 2 diabetes mellitus with other specified complication, without long-term current use of insulin (HCC)    Chronic renal impairment, unspecified CKD stage  Comments:  worse , increase fluid intake  Orders:  -     UA w Reflex to Microscopic w Reflex to Culture  -     PTH, intact; Future  -     Phosphorus; Future  -     Vitamin D 25 hydroxy; Future  -     Basic metabolic panel; Future  -     US retroperitoneal complete; Future    Peripheral vascular disease of lower extremity (HCC)  Comments:   asymptomatic  Check arterial Doppler of September 2019  Orders:  -     VAS lower limb arterial duplex, complete bilateral; Future    Liver cyst  -     US liver; Future    Osteoporosis without current pathological fracture, unspecified osteoporosis type  -     Vitamin D 25 hydroxy; Future    Cyst of right kidney  -     US retroperitoneal complete; Future            Subjective:     Patient ID: Pedrito Leong is a 69 y o  female       Patient is here for follow-up on her chronic medical problem  Hypertension    Patient stated her blood pressure at home is well controlled under 130/80 however today blood pressure is little high because she has a new hearing aid and having difficult time to adjust them denied headache or dizziness  Diabetes, patient does not check her blood sugar at home she has been eating a lot of fruits  Denied polyuria or polydipsia  Patient goes for an eye exam  Frequently because she has glaucoma   hyperlipidemia  Admit to regular fat intake denied chest pain denied side effect with the medication   liver cyst   Denied abdominal pain     new complaint  Diarrhea it patient stated yesterday she had 4  bowel movement  softb, no abdominal pain she was eating too much vegetable and salad today has no more diarrhea denied fever or chills   Denied recent traveling or taking antibiotic        Test results   Lab done on July 16, 2019   mammogram  Discussed result with patient      Review of Systems   Constitutional: Negative for activity change, appetite change, chills, fatigue, fever and unexpected weight change  HENT: Negative for congestion, ear discharge, ear pain, hearing loss, nosebleeds, rhinorrhea, sinus pressure, sore throat, tinnitus, trouble swallowing and voice change  Eyes: Negative for photophobia, pain and visual disturbance  Respiratory: Negative for cough, chest tightness, shortness of breath and wheezing  Cardiovascular: Negative for chest pain, palpitations and leg swelling  Gastrointestinal: Positive for diarrhea  Negative for abdominal pain, anal bleeding, blood in stool, constipation, nausea and vomiting  Endocrine: Negative for cold intolerance, heat intolerance, polydipsia and polyuria  Genitourinary: Negative for dysuria, frequency, hematuria and urgency  Musculoskeletal: Negative for arthralgias, back pain, gait problem, joint swelling, myalgias and neck pain  Skin: Negative for rash  Neurological: Negative for dizziness, tremors, seizures, syncope, weakness, light-headedness and headaches  Hematological: Negative for adenopathy   Does not bruise/bleed easily  Psychiatric/Behavioral: Negative for agitation, behavioral problems, confusion, dysphoric mood, hallucinations and sleep disturbance  The patient is not nervous/anxious  Objective:     Physical Exam   Constitutional: She is oriented to person, place, and time  She appears well-developed and well-nourished  No distress  HENT:   Head: Normocephalic and atraumatic  Eyes: Pupils are equal, round, and reactive to light  Conjunctivae and EOM are normal  No scleral icterus  Neck: Neck supple  No JVD present  No thyromegaly present  Cardiovascular: Normal rate, regular rhythm and normal heart sounds  No murmur heard  Pulses:       Carotid pulses are 3+ on the right side, and 3+ on the left side  Extremities  No edema   Pulmonary/Chest: Effort normal and breath sounds normal    Abdominal: Soft  Bowel sounds are normal  She exhibits no distension and no mass  There is no tenderness  There is no rebound and no guarding  No hernia  Lymphadenopathy:     She has no cervical adenopathy  Neurological: She is alert and oriented to person, place, and time  No cranial nerve deficit  She exhibits normal muscle tone  Coordination normal    Skin: No rash noted  Psychiatric: She has a normal mood and affect   Her behavior is normal  Judgment and thought content normal

## 2019-08-03 PROBLEM — N28.1 CYST OF RIGHT KIDNEY: Status: ACTIVE | Noted: 2019-08-03

## 2019-08-08 ENCOUNTER — TELEPHONE (OUTPATIENT)
Dept: FAMILY MEDICINE CLINIC | Facility: CLINIC | Age: 79
End: 2019-08-08

## 2019-08-08 DIAGNOSIS — N18.9 CHRONIC RENAL IMPAIRMENT, UNSPECIFIED CKD STAGE: Primary | ICD-10-CM

## 2019-08-09 ENCOUNTER — TELEPHONE (OUTPATIENT)
Dept: FAMILY MEDICINE CLINIC | Facility: CLINIC | Age: 79
End: 2019-08-09

## 2019-08-09 NOTE — TELEPHONE ENCOUNTER
----- Message from Adelaida Nettles MD sent at 8/8/2019  6:11 PM EDT -----  Renal function is improving  Hydrate self for well    And recheck BMP in 1 week

## 2019-08-22 ENCOUNTER — TELEPHONE (OUTPATIENT)
Dept: FAMILY MEDICINE CLINIC | Facility: CLINIC | Age: 79
End: 2019-08-22

## 2019-08-22 NOTE — TELEPHONE ENCOUNTER
----- Message from Cristóbal Warren MD sent at 8/21/2019  1:15 PM EDT -----  Renal function is stable    Recommend patient to see Nephrology

## 2019-09-30 ENCOUNTER — HOSPITAL ENCOUNTER (OUTPATIENT)
Dept: ULTRASOUND IMAGING | Facility: HOSPITAL | Age: 79
Discharge: HOME | End: 2019-09-30
Attending: FAMILY MEDICINE
Payer: MEDICARE

## 2019-09-30 ENCOUNTER — HOSPITAL ENCOUNTER (OUTPATIENT)
Dept: NON INVASIVE DIAGNOSTICS | Facility: HOSPITAL | Age: 79
Discharge: HOME | End: 2019-09-30
Attending: FAMILY MEDICINE
Payer: MEDICARE

## 2019-09-30 DIAGNOSIS — K76.89 LIVER CYST: ICD-10-CM

## 2019-09-30 DIAGNOSIS — I73.9 PERIPHERAL VASCULAR DISEASE OF LOWER EXTREMITY (HCC): ICD-10-CM

## 2019-09-30 PROCEDURE — 76705 ECHO EXAM OF ABDOMEN: CPT

## 2019-09-30 PROCEDURE — 93925 LOWER EXTREMITY STUDY: CPT | Performed by: SURGERY

## 2019-09-30 PROCEDURE — 93925 LOWER EXTREMITY STUDY: CPT

## 2019-09-30 PROCEDURE — 93923 UPR/LXTR ART STDY 3+ LVLS: CPT

## 2019-09-30 PROCEDURE — 93922 UPR/L XTREMITY ART 2 LEVELS: CPT | Performed by: SURGERY

## 2019-10-01 RX ORDER — DILTIAZEM HYDROCHLORIDE 240 MG/1
CAPSULE, COATED, EXTENDED RELEASE ORAL
COMMUNITY
End: 2019-10-02 | Stop reason: SDUPTHER

## 2019-10-02 ENCOUNTER — OFFICE VISIT (OUTPATIENT)
Dept: FAMILY MEDICINE CLINIC | Facility: CLINIC | Age: 79
End: 2019-10-02
Payer: MEDICARE

## 2019-10-02 VITALS
HEART RATE: 82 BPM | TEMPERATURE: 97.4 F | DIASTOLIC BLOOD PRESSURE: 84 MMHG | WEIGHT: 179 LBS | OXYGEN SATURATION: 98 % | SYSTOLIC BLOOD PRESSURE: 110 MMHG | BODY MASS INDEX: 28.09 KG/M2 | HEIGHT: 67 IN

## 2019-10-02 DIAGNOSIS — M81.0 OSTEOPOROSIS WITHOUT CURRENT PATHOLOGICAL FRACTURE, UNSPECIFIED OSTEOPOROSIS TYPE: ICD-10-CM

## 2019-10-02 DIAGNOSIS — E11.69 TYPE 2 DIABETES MELLITUS WITH OTHER SPECIFIED COMPLICATION, WITHOUT LONG-TERM CURRENT USE OF INSULIN (HCC): ICD-10-CM

## 2019-10-02 DIAGNOSIS — N18.9 CHRONIC RENAL IMPAIRMENT, UNSPECIFIED CKD STAGE: Primary | ICD-10-CM

## 2019-10-02 DIAGNOSIS — I73.9 PERIPHERAL VASCULAR DISEASE OF LOWER EXTREMITY (HCC): ICD-10-CM

## 2019-10-02 DIAGNOSIS — K76.89 LIVER CYST: ICD-10-CM

## 2019-10-02 DIAGNOSIS — E66.3 OVER WEIGHT: ICD-10-CM

## 2019-10-02 DIAGNOSIS — E78.00 PURE HYPERCHOLESTEROLEMIA: ICD-10-CM

## 2019-10-02 DIAGNOSIS — Z23 NEED FOR IMMUNIZATION AGAINST INFLUENZA: ICD-10-CM

## 2019-10-02 DIAGNOSIS — I10 ESSENTIAL HYPERTENSION: ICD-10-CM

## 2019-10-02 DIAGNOSIS — Q61.02 MULTIPLE RENAL CYSTS: ICD-10-CM

## 2019-10-02 PROCEDURE — 90662 IIV NO PRSV INCREASED AG IM: CPT

## 2019-10-02 PROCEDURE — G0008 ADMIN INFLUENZA VIRUS VAC: HCPCS

## 2019-10-02 PROCEDURE — 99214 OFFICE O/P EST MOD 30 MIN: CPT | Performed by: FAMILY MEDICINE

## 2019-10-02 NOTE — PROGRESS NOTES
Assessment/Plan:          Diagnoses and all orders for this visit:    Chronic renal impairment, unspecified CKD stage  Comments:  Stable  To avoid NSAID  Hydrate self well  Orders:  -     CBC and differential; Future  -     Phosphorus; Future  -     Vitamin D 25 hydroxy; Future  -     PTH, intact; Future    Essential hypertension  Comments:  Okay control  To follow with the dash diet  Orders:  -     UA w Reflex to Microscopic w Reflex to Culture  -     Comprehensive metabolic panel; Future  -     CBC and differential; Future    Pure hypercholesterolemia  -     Lipid Panel with Direct LDL reflex; Future  -     Comprehensive metabolic panel; Future    Peripheral vascular disease of lower extremity (HCC)    Liver cyst  Comments:  Liver cysts  Stable  Orders:  -     Comprehensive metabolic panel; Future    Over weight    Multiple renal cysts  Comments:  Check renal ultrasound  Orders:  -     Comprehensive metabolic panel; Future    Type 2 diabetes mellitus with other specified complication, without long-term current use of insulin (HCC)  Comments: To follow with 1800 calorie diet  Orders:  -     Microalbumin / creatinine urine ratio  -     Hemoglobin A1C; Future  -     Comprehensive metabolic panel; Future    Osteoporosis without current pathological fracture, unspecified osteoporosis type  Comments:  Fall prevention weight-bearing exercise, calcium and vitamin-D supplement discussed  pt does not want to take medication  Orders:  -     Comprehensive metabolic panel; Future  -     CBC and differential; Future  -     Vitamin D 25 hydroxy; Future  -     PTH, intact;  Future    Need for immunization against influenza  -     influenza vaccine, 4189-9530, high-dose, PF 0 5 mL (FLUZONE HIGH-DOSE)    Other orders  -     ASPIRIN 81 PO  -     Discontinue: Olmesartan Medoxomil (BENICAR PO)  -     Discontinue: diltiazem (DILTIAZEM CD) 240 mg 24 hr capsule  -     mupirocin (BACTROBAN) 2 % ointment; apply by TOPICAL route  every day a small amount to the surgical site  -     Discontinue: Ezetimibe-Simvastatin (VYTORIN PO)  -     Discontinue: bimatoprost (LUMIGAN) 0 01 % ophthalmic drops  -     Discontinue: brimonidine (ALPHAGAN P) 0 1 %  -     Cancel: influenza vaccine, 8393-3493, high-dose, PF 0 5 mL (FLUZONE HIGH-DOSE)            Subjective:     Patient ID: Jet Self is a 69 y o  female      Patient is here for follow-up on her chronic medical problem  Hypertension  Admit to regular salt intake  Denied headache flushing or dizziness  Diabetes  Patient is going to see her eye doctor next week she does see her eye doctor regularly  Denied polyuria or polydipsia  Patient does not check blood sugar at home  She does try to watch her diet  Hyperlipidemia denied side effect with the medication  Denied chest pain  Peripheral vascular disease  Denied claudication or change in the color of her feet  Chronic renal insufficiency  Denied edema, or problem with urination  Test results  Extremities  Liver ultrasound  Renal ultrasound  Not done  Blood work done on August 20, 2019  Discussed result with patient      Review of Systems   Constitutional: Negative for appetite change, chills and fatigue  HENT: Negative for ear pain, sore throat, tinnitus, trouble swallowing and voice change  Eyes: Negative for photophobia, pain, discharge and visual disturbance  Respiratory: Negative for cough, chest tightness, shortness of breath and wheezing  Cardiovascular: Negative for chest pain, palpitations and leg swelling  Gastrointestinal: Negative for abdominal distention, abdominal pain, anal bleeding, blood in stool, constipation, diarrhea, nausea and rectal pain  Endocrine: Negative for cold intolerance, heat intolerance, polydipsia, polyphagia and polyuria     Genitourinary: Negative for decreased urine volume, difficulty urinating, dysuria, flank pain, frequency, hematuria, urgency, vaginal bleeding and vaginal discharge  Musculoskeletal: Negative for arthralgias, back pain, gait problem, myalgias and neck pain  Skin: Negative for pallor and rash  Allergic/Immunologic: Negative for immunocompromised state  Neurological: Negative for dizziness, seizures, syncope, speech difficulty and light-headedness  Hematological: Negative for adenopathy  Does not bruise/bleed easily  Psychiatric/Behavioral: Negative for agitation, behavioral problems, confusion and hallucinations  The patient is not nervous/anxious  Objective:     Physical Exam   Constitutional: She is oriented to person, place, and time  She appears well-developed and well-nourished  No distress  HENT:   Head: Normocephalic and atraumatic  Eyes: Pupils are equal, round, and reactive to light  Conjunctivae and EOM are normal  No scleral icterus  Neck: No JVD present  No thyromegaly present  Cardiovascular: Normal rate, regular rhythm and normal heart sounds  Pulses are weak pulses  No murmur heard  Pulses:       Dorsalis pedis pulses are 0 on the right side, and 0 on the left side  Posterior tibial pulses are 0 on the right side, and 0 on the left side  Extremities  No edema   Pulmonary/Chest: Effort normal and breath sounds normal    Abdominal: Soft  She exhibits no mass  There is no tenderness  There is no rebound and no guarding  No hernia  Feet:   Right Foot:   Skin Integrity: Negative for ulcer, skin breakdown, erythema, warmth, callus or dry skin  Left Foot:   Skin Integrity: Negative for ulcer, skin breakdown, erythema, warmth, callus or dry skin  Lymphadenopathy:     She has no cervical adenopathy  Neurological: She is alert and oriented to person, place, and time  No cranial nerve deficit  She exhibits normal muscle tone  Coordination normal    Skin: No rash noted  Psychiatric: She has a normal mood and affect  Her behavior is normal  Judgment normal    BMI Counseling: Body mass index is 28 43 kg/m²   The BMI is above normal  Nutrition recommendations include reducing portion sizes  Right Foot/Ankle   Right Foot Inspection  Skin Exam: skin normal and skin intact no dry skin, no warmth, no callus, no erythema, no maceration, no abnormal color, no pre-ulcer, no ulcer and no callus                          Toe Exam: right toe deformityno swelling, no tenderness and erythema  Sensory       Monofilament testing: intact  Vascular  Capillary refills: < 3 seconds  The right DP pulse is 0  The right PT pulse is 0  Left Foot/Ankle  Left Foot Inspection  Skin Exam: skin normal and skin intactno dry skin, no warmth, no erythema, no maceration, normal color, no pre-ulcer, no ulcer and no callus                         Toe Exam: ROM and strength within normal limitsno swelling, no erythema and no left toe deformity                   Sensory       Monofilament: intact  Vascular  Capillary refills: < 3 seconds  The left DP pulse is 0  The left PT pulse is 0  Assign Risk Category:  Deformity present;  No loss of protective sensation; Weak pulses       Risk: 1

## 2019-10-08 LAB
LEFT EYE DIABETIC RETINOPATHY: NORMAL
RIGHT EYE DIABETIC RETINOPATHY: NORMAL

## 2019-10-11 ENCOUNTER — HOSPITAL ENCOUNTER (EMERGENCY)
Facility: HOSPITAL | Age: 79
Discharge: HOME | End: 2019-10-11
Attending: EMERGENCY MEDICINE
Payer: MEDICARE

## 2019-10-11 VITALS
DIASTOLIC BLOOD PRESSURE: 70 MMHG | TEMPERATURE: 97.4 F | HEART RATE: 70 BPM | OXYGEN SATURATION: 99 % | RESPIRATION RATE: 18 BRPM | SYSTOLIC BLOOD PRESSURE: 161 MMHG

## 2019-10-11 DIAGNOSIS — R42 LIGHTHEADEDNESS: ICD-10-CM

## 2019-10-11 DIAGNOSIS — R42 DIZZINESS: Primary | ICD-10-CM

## 2019-10-11 LAB
ANION GAP SERPL CALCULATED.3IONS-SCNC: 11 MMOL/L (ref 4–13)
ATRIAL RATE: 76 BPM
BACTERIA UR QL AUTO: ABNORMAL /HPF
BASOPHILS # BLD AUTO: 0.04 THOUSANDS/ΜL (ref 0–0.1)
BASOPHILS NFR BLD AUTO: 1 % (ref 0–1)
BILIRUB UR QL STRIP: NEGATIVE
BUN SERPL-MCNC: 28 MG/DL (ref 5–25)
CALCIUM SERPL-MCNC: 9.2 MG/DL (ref 8.3–10.1)
CHLORIDE SERPL-SCNC: 102 MMOL/L (ref 100–108)
CLARITY UR: CLEAR
CO2 SERPL-SCNC: 24 MMOL/L (ref 21–32)
COLOR UR: YELLOW
CREAT SERPL-MCNC: 0.81 MG/DL (ref 0.6–1.3)
EOSINOPHIL # BLD AUTO: 0.02 THOUSAND/ΜL (ref 0–0.61)
EOSINOPHIL NFR BLD AUTO: 0 % (ref 0–6)
ERYTHROCYTE [DISTWIDTH] IN BLOOD BY AUTOMATED COUNT: 13.2 % (ref 11.6–15.1)
GFR SERPL CREATININE-BSD FRML MDRD: 70 ML/MIN/1.73SQ M
GLUCOSE SERPL-MCNC: 120 MG/DL (ref 65–140)
GLUCOSE UR STRIP-MCNC: NEGATIVE MG/DL
HCT VFR BLD AUTO: 38.1 % (ref 34.8–46.1)
HGB BLD-MCNC: 12.5 G/DL (ref 11.5–15.4)
HGB UR QL STRIP.AUTO: ABNORMAL
IMM GRANULOCYTES # BLD AUTO: 0.03 THOUSAND/UL (ref 0–0.2)
IMM GRANULOCYTES NFR BLD AUTO: 0 % (ref 0–2)
KETONES UR STRIP-MCNC: NEGATIVE MG/DL
LEUKOCYTE ESTERASE UR QL STRIP: NEGATIVE
LYMPHOCYTES # BLD AUTO: 0.64 THOUSANDS/ΜL (ref 0.6–4.47)
LYMPHOCYTES NFR BLD AUTO: 8 % (ref 14–44)
MCH RBC QN AUTO: 31.2 PG (ref 26.8–34.3)
MCHC RBC AUTO-ENTMCNC: 32.8 G/DL (ref 31.4–37.4)
MCV RBC AUTO: 95 FL (ref 82–98)
MONOCYTES # BLD AUTO: 0.31 THOUSAND/ΜL (ref 0.17–1.22)
MONOCYTES NFR BLD AUTO: 4 % (ref 4–12)
NEUTROPHILS # BLD AUTO: 7.52 THOUSANDS/ΜL (ref 1.85–7.62)
NEUTS SEG NFR BLD AUTO: 87 % (ref 43–75)
NITRITE UR QL STRIP: NEGATIVE
NON-SQ EPI CELLS URNS QL MICRO: ABNORMAL /HPF
NRBC BLD AUTO-RTO: 0 /100 WBCS
P AXIS: 62 DEGREES
PH UR STRIP.AUTO: 5.5 [PH] (ref 4.5–8)
PLATELET # BLD AUTO: 271 THOUSANDS/UL (ref 149–390)
PMV BLD AUTO: 10.3 FL (ref 8.9–12.7)
POTASSIUM SERPL-SCNC: 4 MMOL/L (ref 3.5–5.3)
PR INTERVAL: 166 MS
PROT UR STRIP-MCNC: NEGATIVE MG/DL
QRS AXIS: 1 DEGREES
QRSD INTERVAL: 88 MS
QT INTERVAL: 378 MS
QTC INTERVAL: 425 MS
RBC # BLD AUTO: 4.01 MILLION/UL (ref 3.81–5.12)
RBC #/AREA URNS AUTO: ABNORMAL /HPF
SODIUM SERPL-SCNC: 137 MMOL/L (ref 136–145)
SP GR UR STRIP.AUTO: 1.01 (ref 1–1.03)
T WAVE AXIS: 68 DEGREES
UROBILINOGEN UR QL STRIP.AUTO: 0.2 E.U./DL
VENTRICULAR RATE: 76 BPM
WBC # BLD AUTO: 8.56 THOUSAND/UL (ref 4.31–10.16)
WBC #/AREA URNS AUTO: ABNORMAL /HPF

## 2019-10-11 PROCEDURE — 99284 EMERGENCY DEPT VISIT MOD MDM: CPT

## 2019-10-11 PROCEDURE — 85025 COMPLETE CBC W/AUTO DIFF WBC: CPT | Performed by: EMERGENCY MEDICINE

## 2019-10-11 PROCEDURE — 96360 HYDRATION IV INFUSION INIT: CPT

## 2019-10-11 PROCEDURE — 99285 EMERGENCY DEPT VISIT HI MDM: CPT | Performed by: EMERGENCY MEDICINE

## 2019-10-11 PROCEDURE — 36415 COLL VENOUS BLD VENIPUNCTURE: CPT | Performed by: EMERGENCY MEDICINE

## 2019-10-11 PROCEDURE — 93010 ELECTROCARDIOGRAM REPORT: CPT | Performed by: INTERNAL MEDICINE

## 2019-10-11 PROCEDURE — 80048 BASIC METABOLIC PNL TOTAL CA: CPT | Performed by: EMERGENCY MEDICINE

## 2019-10-11 PROCEDURE — 81001 URINALYSIS AUTO W/SCOPE: CPT

## 2019-10-11 PROCEDURE — 93005 ELECTROCARDIOGRAM TRACING: CPT

## 2019-10-11 RX ADMIN — SODIUM CHLORIDE 1000 ML: 0.9 INJECTION, SOLUTION INTRAVENOUS at 11:23

## 2019-10-11 NOTE — ED NOTES
Water provided to patient for PO hydration as per provider's request  Patient informed by provided and this RN that urine specimen is needed  Instructed to ring call bell when ready to use restroom        Rhiannon Waters RN  10/11/19 5251

## 2019-10-11 NOTE — ED NOTES
Patient ambulated back from waiting room  Steady gait noted        Sherene Hodgkin, RN  10/11/19 1053

## 2019-10-11 NOTE — ED NOTES
Pt ambulates in ED without incident  Pt returned to ED 15  IVF infusing        Arlette Renee RN  10/11/19 5801

## 2019-10-11 NOTE — ED PROVIDER NOTES
History  Chief Complaint   Patient presents with    Dizziness     Patient reports waking up with dizziness this AM  States she also is experiencing nausea and had 5 BMs this AM  Denies diarrhea  Also reports blood pressure was elevated before taking BP meds  Recent travel within the country and reports she had R ear pain while traveling  Recent wax removal       67-year-old female presents evaluation of non rotational dizziness upon waking this morning  The patient states that she was lying in bed and felt dizzy without headache or other focal neurologic deficit  The patient had associated nausea with vomiting  The patient then went to the bathroom and had 5 bowel movements that were nonbloody non diarrhea  Patient states that her symptoms were improved when she was vertical in when she was walking  She states they have been improving with time  She denies any associated chest pain, pressure, shortness of breath  The patient denies any recent fall or head injury  Prior to Admission Medications   Prescriptions Last Dose Informant Patient Reported? Taking?    ASPIRIN 81 PO 10/10/2019 at Unknown time  Yes Yes   Calcium Carbonate-Vitamin D (CALTRATE 600+D PO) 10/10/2019 at Unknown time Self Yes Yes   Sig: Take 2 tablets by mouth daily    Multiple Vitamins-Minerals (CENTRUM SILVER ULTRA WOMENS PO) 10/10/2019 at Unknown time Self Yes Yes   Sig: Take 1 tablet by mouth daily   bimatoprost (LUMIGAN) 0 01 % ophthalmic drops 10/10/2019 at Unknown time Self Yes Yes   Sig: Apply 1 drop to eye Daily   brimonidine (ALPHAGAN P) 0 1 % 10/11/2019 at Unknown time Self Yes Yes   Sig: Apply 1 drop to eye every 12 (twelve) hours   diltiazem (CARDIZEM LA) 180 MG 24 hr tablet 10/11/2019 at Unknown time  No Yes   Sig: Take 1 tablet (180 mg total) by mouth daily   ezetimibe (ZETIA) 10 mg tablet 10/10/2019 at Unknown time  No Yes   Sig: Take 1 tablet (10 mg total) by mouth daily   mupirocin (BACTROBAN) 2 % ointment   Yes No Sig: apply by TOPICAL route  every day a small amount to the surgical site   olmesartan (BENICAR) 20 mg tablet 10/11/2019 at Unknown time Self No Yes   Sig: Take 1 tablet (20 mg total) by mouth daily   rosuvastatin (CRESTOR) 10 MG tablet 10/10/2019 at Unknown time  No Yes   Sig: Take 1 tablet (10 mg total) by mouth daily      Facility-Administered Medications: None       Past Medical History:   Diagnosis Date    Hypertension        Past Surgical History:   Procedure Laterality Date    TUBAL LIGATION Bilateral        Family History   Problem Relation Age of Onset    Breast cancer Mother     Heart attack Father     Coronary artery disease Father     Colon polyps Brother      I have reviewed and agree with the history as documented  Social History     Tobacco Use    Smoking status: Former Smoker     Types: Cigarettes    Smokeless tobacco: Former User   Substance Use Topics    Alcohol use: Yes     Comment: ocassionally    Drug use: No        Review of Systems   Constitutional: Negative for chills, fatigue and fever  HENT: Negative for sore throat  Eyes: Negative for visual disturbance  Respiratory: Negative for shortness of breath  Cardiovascular: Negative for chest pain  Gastrointestinal: Negative for abdominal pain, diarrhea, nausea and vomiting  Genitourinary: Negative for difficulty urinating, dysuria and pelvic pain  Musculoskeletal: Negative for back pain  Skin: Negative for rash  Neurological: Positive for dizziness and light-headedness  Negative for syncope, weakness and headaches  All other systems reviewed and are negative  Physical Exam  Physical Exam   Constitutional: She is oriented to person, place, and time  She appears well-developed and well-nourished  No distress  HENT:   Head: Normocephalic and atraumatic  Right Ear: External ear normal    Left Ear: External ear normal    Eyes: Pupils are equal, round, and reactive to light   Conjunctivae and EOM are normal  No scleral icterus  Neck: Normal range of motion  Cardiovascular: Normal rate, regular rhythm and normal heart sounds  Pulmonary/Chest: Effort normal and breath sounds normal  No respiratory distress  Abdominal: Soft  Bowel sounds are normal  There is no tenderness  There is no rebound and no guarding  Musculoskeletal: Normal range of motion  She exhibits no edema  Neurological: She is alert and oriented to person, place, and time  Skin: Skin is warm and dry  No rash noted  Psychiatric: She has a normal mood and affect  Nursing note and vitals reviewed        Vital Signs  ED Triage Vitals   Temperature Pulse Respirations Blood Pressure SpO2   10/11/19 1046 10/11/19 1046 10/11/19 1046 10/11/19 1046 10/11/19 1046   (!) 97 4 °F (36 3 °C) 78 18 165/74 98 %      Temp Source Heart Rate Source Patient Position - Orthostatic VS BP Location FiO2 (%)   10/11/19 1046 10/11/19 1046 10/11/19 1046 10/11/19 1046 --   Oral Monitor Lying Right arm       Pain Score       10/11/19 1035       No Pain           Vitals:    10/11/19 1046 10/11/19 1144   BP: 165/74 161/70   Pulse: 78 70   Patient Position - Orthostatic VS: Lying Sitting         Visual Acuity      ED Medications  Medications   sodium chloride 0 9 % bolus 1,000 mL (0 mL Intravenous Stopped 10/11/19 1223)       Diagnostic Studies  Results Reviewed     Procedure Component Value Units Date/Time    Urine Microscopic [[id number]]  (Abnormal) Collected:  10/11/19 1146    Lab Status:  Final result Specimen:  Urine, Clean Catch Updated:  10/11/19 1307     RBC, UA 2-4 /hpf      WBC, UA 1-2 /hpf      Epithelial Cells Occasional /hpf      Bacteria, UA Occasional /hpf     POCT urinalysis dipstick [[id number]]  (Abnormal) Resulted:  10/11/19 1151    Lab Status:  Final result Specimen:  Urine Updated:  10/11/19 1151    ED Urine Macroscopic [[id number]]  (Abnormal) Collected:  10/11/19 1146    Lab Status:  Final result Specimen:  Urine Updated:  10/11/19 1148 Color, UA Yellow     Clarity, UA Clear     pH, UA 5 5     Leukocytes, UA Negative     Nitrite, UA Negative     Protein, UA Negative mg/dl      Glucose, UA Negative mg/dl      Ketones, UA Negative mg/dl      Urobilinogen, UA 0 2 E U /dl      Bilirubin, UA Negative     Blood, UA Trace     Specific [place], UA 1 010    Narrative:       CLINITEK RESULT    Basic metabolic panel [[id number]]  (Abnormal) Collected:  10/11/19 1121    Lab Status:  Final result Specimen:  Blood from Arm, Left Updated:  10/11/19 1140     Sodium 137 mmol/L      Potassium 4 0 mmol/L      Chloride 102 mmol/L      CO2 24 mmol/L      ANION GAP 11 mmol/L      BUN 28 mg/dL      Creatinine 0 81 mg/dL      Glucose 120 mg/dL      Calcium 9 2 mg/dL      eGFR 70 ml/min/1 73sq m     Narrative:       National Kidney Disease Foundation guidelines for Chronic Kidney Disease (CKD):     Stage 1 with normal or high GFR (GFR > 90 mL/min/1 73 square meters)    Stage 2 Mild CKD (GFR = 60-89 mL/min/1 73 square meters)    Stage 3A Moderate CKD (GFR = 45-59 mL/min/1 73 square meters)    Stage 3B Moderate CKD (GFR = 30-44 mL/min/1 73 square meters)    Stage 4 Severe CKD (GFR = 15-29 mL/min/1 73 square meters)    Stage 5 End Stage CKD (GFR <15 mL/min/1 73 square meters)  Note: GFR calculation is accurate only with a steady state creatinine    CBC and differential [[id number]]  (Abnormal) Collected:  10/11/19 1121    Lab Status:  Final result Specimen:  Blood from Arm, Left Updated:  10/11/19 1129     WBC 8 56 Thousand/uL      RBC 4 01 Million/uL      Hemoglobin 12 5 g/dL      Hematocrit 38 1 %      MCV 95 fL      MCH 31 2 pg      MCHC 32 8 g/dL      RDW 13 2 %      MPV 10 3 fL      Platelets 650 Thousands/uL      nRBC 0 /100 WBCs      Neutrophils Relative 87 %      Immat GRANS % 0 %      Lymphocytes Relative 8 %      Monocytes Relative 4 %      Eosinophils Relative 0 %      Basophils Relative 1 %      Neutrophils Absolute 7 52 Thousands/µL      Immature Grans Absolute 0 03 Thousand/uL      Lymphocytes Absolute 0 64 Thousands/µL      Monocytes Absolute 0 31 Thousand/µL      Eosinophils Absolute 0 02 Thousand/µL      Basophils Absolute 0 04 Thousands/µL                  No orders to display              Procedures  ECG 12 Lead Documentation Only  Date/Time: 10/11/2019 11:15 AM  Performed by: Andrew Mireles DO  Authorized by: Andrew Mireles DO     Indications / Diagnosis:  Dizziness  ECG reviewed by me, the ED Provider: yes    Patient location:  ED  Previous ECG:     Previous ECG:  Unavailable  Interpretation:     Interpretation: normal    Rate:     ECG rate:  76    ECG rate assessment: normal    Rhythm:     Rhythm: sinus rhythm    Ectopy:     Ectopy: none    QRS:     QRS axis:  Normal    QRS intervals:  Normal  Conduction:     Conduction: normal    ST segments:     ST segments:  Normal  T waves:     T waves: normal             ED Course                               MDM  Number of Diagnoses or Management Options  Dizziness: new and requires workup  Lightheadedness: new and requires workup  Diagnosis management comments: Plan is to check an EKG, laboratories and urine to rule out urinary tract infection, dehydration, electrolyte disturbance or renal failure  I have a low clinical suspicion for central neurologic event as patient is significantly improved and her symptoms transient with relation to multiple bowel movements    The patient (and any family present) verbalized understanding of the discharge instructions and warnings that would necessitate return to the Emergency Department  All questions were answered prior to discharge         Amount and/or Complexity of Data Reviewed  Clinical lab tests: ordered and reviewed  Tests in the medicine section of CPT®: ordered and reviewed  Review and summarize past medical records: yes        Disposition  Final diagnoses:   Dizziness   Lightheadedness     Time reflects when diagnosis was documented in both MDM as applicable and the Disposition within this note     Time User Action Codes Description Comment    10/11/2019 12:22 PM [place] Add [R42] Dizziness     10/11/2019 12:22 PM [place] Add [R42] [facility]       ED Disposition     ED Disposition Condition Date/Time Comment    Discharge Stable Fri Oct 11, 2019 12:22 PM Sally Morgan discharge to home/self care              Follow-up Information     Follow up With Specialties Details Why Contact Info Additional Information    Christy Dominguez MD Family Medicine Schedule an appointment as soon as possible for a visit in 1 week For further evaluation [address],  [phone]  [address] [facility] Emergency Department Emergency Medicine Go to  If symptoms worsen [facility] [id number]  [place] [address] ED, [address], [place], [address]          Discharge Medication List as of 10/11/2019 12:22 PM      CONTINUE these medications which have NOT CHANGED    Details   ASPIRIN 81 PO Historical Med      bimatoprost (LUMIGAN) 0 01 % ophthalmic drops Apply 1 drop to eye Daily, Historical Med      brimonidine (ALPHAGAN P) 0 1 % Apply 1 drop to eye every 12 (twelve) hours, Historical Med      Calcium Carbonate-Vitamin D (CALTRATE 600+D PO) Take 2 tablets by mouth daily , Historical Med      diltiazem (CARDIZEM LA) 180 MG 24 hr tablet Take 1 tablet (180 mg total) by mouth daily, Starting Wed 7/31/2019, Normal      ezetimibe (ZETIA) 10 mg tablet Take 1 tablet (10 mg total) by mouth daily, Starting Wed 7/31/2019, Normal      Multiple Vitamins-Minerals (CENTRUM SILVER ULTRA WOMENS PO) Take 1 tablet by mouth daily, Historical Med      mupirocin (BACTROBAN) 2 % ointment apply by TOPICAL route  every day a small amount to the surgical site, Historical Med      olmesartan (BENICAR) 20 mg tablet Take 1 tablet (20 mg total) by mouth daily, Starting Thu 1/24/2019, Normal      rosuvastatin (CRESTOR) 10 MG tablet Take 1 tablet (10 mg total) by mouth daily, Starting Wed 7/31/2019, Normal           No discharge procedures on file      ED Provider  Electronically Signed by           Beau Hernandez DO  10/13/19 [id number]

## 2019-10-17 ENCOUNTER — OFFICE VISIT (OUTPATIENT)
Dept: FAMILY MEDICINE CLINIC | Facility: CLINIC | Age: 79
End: 2019-10-17
Payer: MEDICARE

## 2019-10-17 VITALS
TEMPERATURE: 97.8 F | OXYGEN SATURATION: 97 % | SYSTOLIC BLOOD PRESSURE: 148 MMHG | BODY MASS INDEX: 30.29 KG/M2 | HEIGHT: 65 IN | HEART RATE: 90 BPM | WEIGHT: 181.8 LBS | DIASTOLIC BLOOD PRESSURE: 88 MMHG

## 2019-10-17 DIAGNOSIS — R82.90 ABNORMAL URINE FINDING: ICD-10-CM

## 2019-10-17 DIAGNOSIS — R42 DIZZINESS: Primary | ICD-10-CM

## 2019-10-17 DIAGNOSIS — I10 ESSENTIAL HYPERTENSION: ICD-10-CM

## 2019-10-17 DIAGNOSIS — N18.9 CHRONIC RENAL IMPAIRMENT, UNSPECIFIED CKD STAGE: ICD-10-CM

## 2019-10-17 PROCEDURE — 99214 OFFICE O/P EST MOD 30 MIN: CPT | Performed by: FAMILY MEDICINE

## 2019-10-17 RX ORDER — DILTIAZEM HYDROCHLORIDE EXTENDED-RELEASE TABLETS 240 MG/1
240 TABLET, EXTENDED RELEASE ORAL DAILY
Qty: 30 TABLET | Refills: 2 | Status: SHIPPED | OUTPATIENT
Start: 2019-10-17 | End: 2019-10-17 | Stop reason: SDUPTHER

## 2019-10-17 RX ORDER — DILTIAZEM HYDROCHLORIDE EXTENDED-RELEASE TABLETS 240 MG/1
240 TABLET, EXTENDED RELEASE ORAL DAILY
Qty: 30 TABLET | Refills: 2 | Status: SHIPPED | OUTPATIENT
Start: 2019-10-17 | End: 2019-12-11 | Stop reason: SDUPTHER

## 2019-10-17 NOTE — PROGRESS NOTES
Assessment/Plan:          Diagnoses and all orders for this visit:    Dizziness  Comments:  Most likely secondary to inner ear  However discussed R/O  TIA  Recommend to do CT scan of the head  PT declined, advised patient if symptom recur to go to ER    Essential hypertension  Comments:  Not controlled  Change Cardizem LA from 180 mg to to 40 mg daily  Orders:  -     Discontinue: diltiazem (CARDIZEM LA) 240 MG 24 hr tablet; Take 1 tablet (240 mg total) by mouth daily  -     diltiazem (CARDIZEM LA) 240 MG 24 hr tablet; Take 1 tablet (240 mg total) by mouth daily    Abnormal urine finding  -     Urine culture; Future  -     Urinalysis with reflex to microscopic    Chronic renal impairment, unspecified CKD stage  Comments:  Stable  Avoid NSAID  Subjective:     Patient ID: Riley Johnston is a 67 y o  female      Dizziness  Patient stated the on October 11 she was on her bed and usually she does exercise by moving her body from left to right laying down  After she did her exercise she felt some off balance feeling on her head, moderate, persistent  She got up to go to the bathroom  She felt nauseated  And after that she had like 4- 5 bowel movement laterally  Patient denied headache  Loss of vision, slurred speech weakness or numbness  Patient check blood pressure at home was high  But she was anxious regarding her symptoms  Patient did go to the emergency room  Her symptoms had resolved  And she has feeling well since then  Patient stated she flew to [place] and [place] and she came back September 212  Also recently she was seen by her ENT for routine office visit to move her wax  Patient sees her ENT every 6 months for wax removal   Patient denied earache  Patient with chronic hearing loss bilaterally  No change  Hypertension  Patient stated she has been checking her blood pressure at home and is running between 105- 125 over 70s  Diabetes  Denied polyuria or polydipsia      ER report on October 11, 2019 noted  Review of Systems   Constitutional: Negative for activity change, appetite change, chills, fatigue, fever and unexpected weight change  HENT: Negative for congestion, ear discharge, ear pain, hearing loss, nosebleeds, rhinorrhea, sinus pressure, sore throat, tinnitus, trouble swallowing and voice change  Eyes: Negative for photophobia, pain and visual disturbance  Respiratory: Negative for cough, chest tightness, shortness of breath and wheezing  Cardiovascular: Negative for chest pain, palpitations and leg swelling  Gastrointestinal: Negative for abdominal pain, anal bleeding, blood in stool, constipation, diarrhea, nausea and vomiting  Endocrine: Negative for cold intolerance, heat intolerance, polydipsia and polyuria  Genitourinary: Negative for dysuria, frequency, hematuria and urgency  Musculoskeletal: Negative for arthralgias, back pain, gait problem, joint swelling, myalgias and neck pain  Skin: Negative for rash  Neurological: Negative for tremors, seizures, syncope, facial asymmetry, weakness, light-headedness and headaches  Hematological: Negative for adenopathy  Does not bruise/bleed easily  Psychiatric/Behavioral: Negative for agitation, behavioral problems, confusion, dysphoric mood, hallucinations and sleep disturbance  The patient is not nervous/anxious  Objective:     Physical Exam   Constitutional: She is oriented to person, place, and time  She appears well-developed and well-nourished  No distress  HENT:   Head: Normocephalic and atraumatic  Right Ear: External ear normal    Left Ear: External ear normal    Nose: Nose normal    Mouth/Throat: Oropharynx is clear and moist  No oropharyngeal exudate  Eyes: Pupils are equal, round, and reactive to light  Conjunctivae and EOM are normal  No scleral icterus  Neck: Neck supple  No JVD present  No thyromegaly present     Cardiovascular: Normal rate, regular rhythm and normal heart sounds  No murmur heard  Pulses:       Carotid pulses are 3+ on the right side, and 3+ on the left side  Dorsalis pedis pulses are 3+ on the right side, and 3+ on the left side  Posterior tibial pulses are 3+ on the right side, and 3+ on the left side  Pulmonary/Chest: Effort normal and breath sounds normal  No stridor  No respiratory distress  She has no wheezes  She has no rales  Abdominal: Soft  Bowel sounds are normal  She exhibits no distension and no mass  There is no tenderness  There is no rebound and no guarding  No hernia  Musculoskeletal: Normal range of motion  She exhibits no edema or deformity  Lymphadenopathy:     She has no cervical adenopathy  Neurological: She is alert and oriented to person, place, and time  She displays normal reflexes  No cranial nerve deficit or sensory deficit  She exhibits normal muscle tone  Coordination normal    Gait  Normal balance  Negative Babinski  Negative Romberg   Skin: No rash noted  She is not diaphoretic  Psychiatric: She has a normal mood and affect   Her behavior is normal  Judgment and thought content normal

## 2019-12-02 ENCOUNTER — OFFICE VISIT (OUTPATIENT)
Dept: FAMILY MEDICINE CLINIC | Facility: CLINIC | Age: 79
End: 2019-12-02
Payer: MEDICARE

## 2019-12-02 VITALS
TEMPERATURE: 97.9 F | BODY MASS INDEX: 30.39 KG/M2 | OXYGEN SATURATION: 96 % | HEIGHT: 65 IN | DIASTOLIC BLOOD PRESSURE: 80 MMHG | SYSTOLIC BLOOD PRESSURE: 140 MMHG | WEIGHT: 182.4 LBS | HEART RATE: 83 BPM

## 2019-12-02 DIAGNOSIS — I10 ESSENTIAL HYPERTENSION: ICD-10-CM

## 2019-12-02 DIAGNOSIS — R31.21 ASYMPTOMATIC MICROSCOPIC HEMATURIA: Primary | ICD-10-CM

## 2019-12-02 PROCEDURE — 99213 OFFICE O/P EST LOW 20 MIN: CPT | Performed by: FAMILY MEDICINE

## 2019-12-02 NOTE — PROGRESS NOTES
Assessment/Plan:          Diagnoses and all orders for this visit:    Asymptomatic microscopic hematuria  Comments:   refer patient to Urology   Patient stated after her test done she will see Urology   Orders:  -     Cytology, urine; Future  -     US kidney and bladder; Future    Essential hypertension  Comments:  Blood pressure well controlled at home  Keep everything the same bring blood pressure machine with you with next office visit to be checked            Subjective:     Patient ID: Kely Bernal is a 67 y o  female      Patient is here for follow-up  Hypertension  Patient had increased her Cardizem from 180 mg to 240 mg since last office visit  Patient has been checking her blood pressure at home it is well controlled patient wrote a note of her blood pressure reading  Reviewed  Or looks good  Denied headache or dizziness admit to regular salt intake  Microscopic hematuria  Patient denied hematuria or flank pain  Dizziness  Resolved    Test results  Lab done October 23, 2019  Discussed result with patient      Review of Systems   Constitutional: Negative for appetite change  Eyes: Negative for pain  Respiratory: Negative for cough and shortness of breath  Cardiovascular: Negative for chest pain, palpitations and leg swelling  Gastrointestinal: Negative for abdominal pain, blood in stool, constipation, diarrhea, nausea and vomiting  Endocrine: Negative for cold intolerance  Genitourinary: Negative for dysuria, frequency, hematuria, urgency and vaginal bleeding  Skin: Negative for rash  Neurological: Negative for dizziness, tremors, seizures, syncope, weakness, light-headedness and headaches  Hematological: Negative for adenopathy  Does not bruise/bleed easily  Psychiatric/Behavioral: The patient is not nervous/anxious  Objective:     Physical Exam   Constitutional: She is oriented to person, place, and time  She appears well-developed  No distress     Eyes: No scleral icterus  Neck: No JVD present  No thyromegaly present  Cardiovascular: Normal rate, regular rhythm and normal heart sounds  No murmur heard  Extremities  No edema   Pulmonary/Chest: Effort normal and breath sounds normal    Abdominal: Soft  She exhibits no mass  There is no tenderness  There is no rebound  No hernia  Lymphadenopathy:     She has no cervical adenopathy  Neurological: She is alert and oriented to person, place, and time  No cranial nerve deficit  She exhibits normal muscle tone  Coordination normal    Skin: No rash noted  Psychiatric: She has a normal mood and affect   Judgment normal

## 2019-12-02 NOTE — PATIENT INSTRUCTIONS
Patient to follow up with her test results  Patient has a regular office visit for follow-up early next months    Advised to keep it

## 2019-12-06 ENCOUNTER — HOSPITAL ENCOUNTER (OUTPATIENT)
Dept: ULTRASOUND IMAGING | Facility: HOSPITAL | Age: 79
Discharge: HOME | End: 2019-12-06
Attending: FAMILY MEDICINE
Payer: MEDICARE

## 2019-12-06 DIAGNOSIS — R31.21 ASYMPTOMATIC MICROSCOPIC HEMATURIA: ICD-10-CM

## 2019-12-06 PROCEDURE — 76770 US EXAM ABDO BACK WALL COMP: CPT

## 2019-12-11 DIAGNOSIS — I10 ESSENTIAL HYPERTENSION: ICD-10-CM

## 2019-12-11 RX ORDER — DILTIAZEM HYDROCHLORIDE EXTENDED-RELEASE TABLETS 240 MG/1
TABLET, EXTENDED RELEASE ORAL
Qty: 30 TABLET | Refills: 0 | Status: SHIPPED | OUTPATIENT
Start: 2019-12-11 | End: 2020-01-27 | Stop reason: SDUPTHER

## 2019-12-19 ENCOUNTER — TELEPHONE (OUTPATIENT)
Dept: FAMILY MEDICINE CLINIC | Facility: CLINIC | Age: 79
End: 2019-12-19

## 2019-12-19 DIAGNOSIS — K76.89 LIVER CYST: Primary | ICD-10-CM

## 2019-12-24 ENCOUNTER — TELEPHONE (OUTPATIENT)
Dept: FAMILY MEDICINE CLINIC | Facility: CLINIC | Age: 79
End: 2019-12-24

## 2019-12-24 DIAGNOSIS — R31.21 ASYMPTOMATIC MICROSCOPIC HEMATURIA: Primary | ICD-10-CM

## 2019-12-30 ENCOUNTER — TELEPHONE (OUTPATIENT)
Dept: FAMILY MEDICINE CLINIC | Facility: CLINIC | Age: 79
End: 2019-12-30

## 2019-12-30 LAB
CREAT ?TM UR-SCNC: 67.1 UMOL/L
EXT MICROALBUMIN URINE RANDOM: 1
HBA1C MFR BLD HPLC: 6 %
MICROALBUMIN/CREAT UR: 14.9 MG/G{CREAT}

## 2019-12-30 NOTE — TELEPHONE ENCOUNTER
----- Message from Jovana Figueroa MD sent at 12/29/2019  9:20 AM EST -----  Urine cytology , atypical urethral cell , pt to follow with urology

## 2020-01-08 ENCOUNTER — OFFICE VISIT (OUTPATIENT)
Dept: FAMILY MEDICINE CLINIC | Facility: CLINIC | Age: 80
End: 2020-01-08
Payer: MEDICARE

## 2020-01-08 VITALS
OXYGEN SATURATION: 97 % | BODY MASS INDEX: 30.46 KG/M2 | TEMPERATURE: 97.8 F | WEIGHT: 182.8 LBS | HEIGHT: 65 IN | SYSTOLIC BLOOD PRESSURE: 132 MMHG | HEART RATE: 88 BPM | DIASTOLIC BLOOD PRESSURE: 82 MMHG

## 2020-01-08 DIAGNOSIS — I73.9 PERIPHERAL VASCULAR DISEASE OF LOWER EXTREMITY (HCC): ICD-10-CM

## 2020-01-08 DIAGNOSIS — R31.21 ASYMPTOMATIC MICROSCOPIC HEMATURIA: ICD-10-CM

## 2020-01-08 DIAGNOSIS — I10 ESSENTIAL HYPERTENSION: Primary | ICD-10-CM

## 2020-01-08 DIAGNOSIS — R82.89 ABNORMAL URINE CYTOLOGY: ICD-10-CM

## 2020-01-08 DIAGNOSIS — N28.1 ACQUIRED COMPLEX RENAL CYST: ICD-10-CM

## 2020-01-08 DIAGNOSIS — E11.69 TYPE 2 DIABETES MELLITUS WITH OTHER SPECIFIED COMPLICATION, WITHOUT LONG-TERM CURRENT USE OF INSULIN (HCC): ICD-10-CM

## 2020-01-08 DIAGNOSIS — N18.9 CHRONIC RENAL IMPAIRMENT, UNSPECIFIED CKD STAGE: ICD-10-CM

## 2020-01-08 DIAGNOSIS — D72.819 LEUKOPENIA, UNSPECIFIED TYPE: ICD-10-CM

## 2020-01-08 DIAGNOSIS — E78.00 PURE HYPERCHOLESTEROLEMIA: ICD-10-CM

## 2020-01-08 DIAGNOSIS — R79.89 LOW VITAMIN D LEVEL: ICD-10-CM

## 2020-01-08 PROCEDURE — 99214 OFFICE O/P EST MOD 30 MIN: CPT | Performed by: FAMILY MEDICINE

## 2020-01-08 RX ORDER — MELATONIN
1000 DAILY
Qty: 30 TABLET | Refills: 2
Start: 2020-01-08 | End: 2020-07-15 | Stop reason: SDUPTHER

## 2020-01-08 RX ORDER — OLMESARTAN MEDOXOMIL 20 MG/1
20 TABLET ORAL DAILY
Qty: 90 TABLET | Refills: 3 | Status: SHIPPED | OUTPATIENT
Start: 2020-01-08 | End: 2020-10-14 | Stop reason: SDUPTHER

## 2020-01-08 NOTE — PROGRESS NOTES
Assessment/Plan:       No problem-specific Assessment & Plan notes found for this encounter  Diagnoses and all orders for this visit:    Essential hypertension  Comments:  Reasonable control  To continue medication to follow with the dash diet  Orders:  -     olmesartan (BENICAR) 20 mg tablet; Take 1 tablet (20 mg total) by mouth daily    Abnormal urine cytology  Comments: To follow with Urology    Asymptomatic microscopic hematuria  Comments: To follow with Urology    Acquired complex renal cyst  Comments:  Left kidney recommend repeat ultrasound of the kidney in 6 months and follow with Urology    Chronic renal impairment, unspecified CKD stage  Comments:  Recent renal function is okay  To avoid NSAID and hydrate self well    Pure hypercholesterolemia  Comments: Well controlled  To follow with low-fat diet    Type 2 diabetes mellitus with other specified complication, without long-term current use of insulin ([facility] [address] )  Comments: Well controlled  To follow with 1800 calorie diet    Leukopenia, unspecified type  -     CBC and differential; Future    Low vitamin D level  Comments:  Add 1000 unit of vitamin-D daily, recheck vitamin-D level in 2 months  Orders:  -     cholecalciferol (VITAMIN D3) 1,000 units tablet; Take 1 tablet (1,000 Units total) by mouth daily    Peripheral vascular disease of lower extremity (HCC)  Comments:  Asymptomatic   Recheck arterial Doppler of the lower extremities September 2020        Patient Instructions   Patient to follow up with test results      Orders Placed This Encounter   Procedures    CBC and differential     This is a patient instruction: This test is non-fasting  Please drink two glasses of water morning of bloodwork  Standing Status:   Future     Standing Expiration Date:   1/8/2021         Subjective:     Patient ID: Duc Mosquera is a 67 y o  female        Patient is here for follow-up on her chronic medical problems  Hypertension    Patient has been checking her blood pressure at home is ranging from 116-130 over 70s  Denied headache or dizziness admit to regular salt intake  Did increase Cardizem to 240  Mg since last office visit  Denied side effect  Patient blood pressure with her machine today at the office was 149/90 after the MA  had checked her blood pressure  And she repeated her blood pressure again and it was 136/86, patient stated she gets nervous when she comes to the office  Microscopic hematuria  Denied hematuria, dysuria or urinary frequency  She has an appointment with Urology for follow-up  Patient has history of smoking 10 cigarettes a day for 10 years but she quit 20 years ago  Chronic renal failure  Denied edema  Diabetes  Denied polyuria or polydipsia ,denied sign or symptom of hypoglycemia  Test results Lab done on December 6 and 30th 2019  Renal and bladder ultrasound  Discussed result with patient      Review of Systems   Constitutional: Negative for chills, diaphoresis and fatigue  HENT: Negative for ear pain, sore throat, trouble swallowing and voice change  Eyes: Negative for visual disturbance  Respiratory: Negative for cough, chest tightness and shortness of breath  Cardiovascular: Negative for chest pain, palpitations and leg swelling  Gastrointestinal: Negative for abdominal pain, blood in stool, constipation, diarrhea and nausea  Endocrine: Negative for polydipsia and polyuria  Genitourinary: Negative for dysuria, flank pain, frequency, hematuria, pelvic pain, urgency, vaginal discharge and vaginal pain  Musculoskeletal: Negative for arthralgias, back pain, gait problem, myalgias and neck pain  Skin: Negative for rash  Allergic/Immunologic: Negative for food allergies  Neurological: Negative for dizziness, tremors, seizures, weakness, light-headedness, numbness and headaches  Hematological: Negative for adenopathy  Does not bruise/bleed easily     Psychiatric/Behavioral: Negative for confusion and dysphoric mood  The patient is not nervous/anxious  Objective:     Physical Exam   Constitutional: She is oriented to person, place, and time  She appears well-developed and well-nourished  No distress  HENT:   Head: Normocephalic  Eyes: Pupils are equal, round, and reactive to light  Conjunctivae and EOM are normal  No scleral icterus  Neck: No thyromegaly present  Cardiovascular: Normal rate, regular rhythm and normal heart sounds  Pulmonary/Chest: Effort normal and breath sounds normal  She has no wheezes  Abdominal: Soft  She exhibits no mass  There is no tenderness  There is no rebound and no guarding  No hernia  Lymphadenopathy:     She has no cervical adenopathy  Neurological: She is alert and oriented to person, place, and time  No cranial nerve deficit  She exhibits normal muscle tone  Coordination normal    Skin: No rash noted  Psychiatric: She has a normal mood and affect  Her behavior is normal  Judgment and thought content normal    Nursing note reviewed  BMI Counseling: Body mass index is 30 42 kg/m²  The BMI is above normal  Nutrition recommendations include decreasing portion sizes, limiting drinks that contain sugar and reducing intake of saturated and trans fat

## 2020-01-27 DIAGNOSIS — I10 ESSENTIAL HYPERTENSION: ICD-10-CM

## 2020-01-27 RX ORDER — DILTIAZEM HYDROCHLORIDE EXTENDED-RELEASE TABLETS 240 MG/1
240 TABLET, EXTENDED RELEASE ORAL DAILY
Qty: 90 TABLET | Refills: 0 | Status: SHIPPED | OUTPATIENT
Start: 2020-01-27 | End: 2020-04-15 | Stop reason: SDUPTHER

## 2020-01-27 NOTE — TELEPHONE ENCOUNTER
Patient would like her script to be 90 days since it is a long term medication  Orders placed to be signed off on

## 2020-02-19 ENCOUNTER — CONSULT (OUTPATIENT)
Dept: UROLOGY | Facility: MEDICAL CENTER | Age: 80
End: 2020-02-19
Payer: MEDICARE

## 2020-02-19 VITALS
SYSTOLIC BLOOD PRESSURE: 120 MMHG | WEIGHT: 183 LBS | DIASTOLIC BLOOD PRESSURE: 78 MMHG | BODY MASS INDEX: 30.49 KG/M2 | HEIGHT: 65 IN

## 2020-02-19 DIAGNOSIS — R31.21 ASYMPTOMATIC MICROSCOPIC HEMATURIA: Primary | ICD-10-CM

## 2020-02-19 DIAGNOSIS — N28.1 COMPLEX RENAL CYST: ICD-10-CM

## 2020-02-19 LAB
SL AMB  POCT GLUCOSE, UA: NORMAL
SL AMB LEUKOCYTE ESTERASE,UA: NORMAL
SL AMB POCT BILIRUBIN,UA: NORMAL
SL AMB POCT BLOOD,UA: NORMAL
SL AMB POCT CLARITY,UA: CLEAR
SL AMB POCT COLOR,UA: YELLOW
SL AMB POCT KETONES,UA: NORMAL
SL AMB POCT NITRITE,UA: NORMAL
SL AMB POCT PH,UA: 5.5
SL AMB POCT SPECIFIC GRAVITY,UA: 1.02
SL AMB POCT URINE PROTEIN: NORMAL
SL AMB POCT UROBILINOGEN: 0.2

## 2020-02-19 PROCEDURE — 81003 URINALYSIS AUTO W/O SCOPE: CPT | Performed by: UROLOGY

## 2020-02-19 PROCEDURE — 99203 OFFICE O/P NEW LOW 30 MIN: CPT | Performed by: UROLOGY

## 2020-02-19 PROCEDURE — 1036F TOBACCO NON-USER: CPT | Performed by: UROLOGY

## 2020-02-19 PROCEDURE — 3074F SYST BP LT 130 MM HG: CPT | Performed by: UROLOGY

## 2020-02-19 PROCEDURE — 3008F BODY MASS INDEX DOCD: CPT | Performed by: UROLOGY

## 2020-02-19 PROCEDURE — 1160F RVW MEDS BY RX/DR IN RCRD: CPT | Performed by: UROLOGY

## 2020-02-19 PROCEDURE — 4040F PNEUMOC VAC/ADMIN/RCVD: CPT | Performed by: UROLOGY

## 2020-02-19 PROCEDURE — 3066F NEPHROPATHY DOC TX: CPT | Performed by: UROLOGY

## 2020-02-19 PROCEDURE — 2022F DILAT RTA XM EVC RTNOPTHY: CPT | Performed by: UROLOGY

## 2020-02-19 PROCEDURE — 3078F DIAST BP <80 MM HG: CPT | Performed by: UROLOGY

## 2020-02-19 RX ORDER — CIPROFLOXACIN 500 MG/1
500 TABLET, FILM COATED ORAL ONCE
Qty: 1 TABLET | Refills: 0 | Status: SHIPPED | OUTPATIENT
Start: 2020-02-19 | End: 2020-02-19

## 2020-02-19 NOTE — PROGRESS NOTES
[address] [facility]  [facility]  [address] [facility], [address] [place] [address]  [phone]  www  [facility]  org      NAME: Angelia Laws  AGE: 67 y o  SEX: female  : 1940   MRN: [id number]    DATE: 2020  TIME: 9:06 AM    Assessment and Plan :    Microscopic hematuria with a complex left mid pole renal cyst 2 2 cm with slight the thick vascular septation - plan cystoscopy in the near future, procedure was described to her, and then renal ultrasound in 6 months  Chief Complaint   No chief complaint on file  History of Present Illness   New patient office visit:  Complex left mid pole renal cyst 2 2 cm with slightly thick vascular septation  This was found on ultrasound 2019  This was done for asymptomatic microscopic hematuria  No real voiding problems  The following portions of the patient's history were reviewed and updated as appropriate: allergies, current medications, past family history, past medical history, past social history, past surgical history and problem list   Past Medical History:   Diagnosis Date    Hypertension      Past Surgical History:   Procedure Laterality Date    TUBAL LIGATION Bilateral      shoulder  Review of Systems   Review of Systems   HENT: Positive for hearing loss  Genitourinary: Negative          Active Problem List     Patient Active Problem List   Diagnosis    Hyperlipidemia    Diabetes ([address] )    Chronic renal insufficiency    Fatty liver    Osteoporosis    Diverticulosis    Peripheral vascular disease of lower extremity (HCC)    Multiple renal cysts    Liver cyst    Glaucoma    Cataract    Colonic polyp    Hearing loss    GERD with esophagitis    Essential hypertension    Peripheral vascular disease ([address] )    Encounter for screening mammogram for breast cancer    Over weight    Cyst of right kidney    Need for immunization against influenza    Leukopenia    Low vitamin D level    Abnormal urine cytology    Asymptomatic microscopic hematuria       Objective   /78   Ht 5' 5" (1 651 m)   Wt 83 kg (183 lb)   BMI 30 45 kg/m²     Physical Exam   Constitutional: She is oriented to person, place, and time  She appears well-developed and well-nourished  HENT:   Head: Normocephalic and atraumatic  Eyes: EOM are normal    Neck: Normal range of motion  Pulmonary/Chest: Effort normal    Musculoskeletal: Normal range of motion  Neurological: She is alert and oriented to person, place, and time  Skin: Skin is warm and dry  Psychiatric: She has a normal mood and affect   Her behavior is normal  Judgment and thought content normal            Current Medications     Current Outpatient Medications:     ASPIRIN 81 PO, , Disp: , Rfl:     bimatoprost (LUMIGAN) 0 01 % ophthalmic drops, Apply 1 drop to eye Daily, Disp: , Rfl:     brimonidine (ALPHAGAN P) 0 1 %, Apply 1 drop to eye every 12 (twelve) hours, Disp: , Rfl:     Calcium Carbonate-Vitamin D (CALTRATE 600+D PO), Take 2 tablets by mouth daily , Disp: , Rfl:     cholecalciferol (VITAMIN D3) 1,000 units tablet, Take 1 tablet (1,000 Units total) by mouth daily, Disp: 30 tablet, Rfl: 2    diltiazem (CARDIZEM LA) 240 MG 24 hr tablet, Take 1 tablet (240 mg total) by mouth daily, Disp: 90 tablet, Rfl: 0    ezetimibe (ZETIA) 10 mg tablet, Take 1 tablet (10 mg total) by mouth daily, Disp: 90 tablet, Rfl: 3    Multiple Vitamins-Minerals (CENTRUM SILVER ULTRA WOMENS PO), Take 1 tablet by mouth daily, Disp: , Rfl:     olmesartan (BENICAR) 20 mg tablet, Take 1 tablet (20 mg total) by mouth daily, Disp: 90 tablet, Rfl: 3    rosuvastatin (CRESTOR) 10 MG tablet, Take 1 tablet (10 mg total) by mouth daily, Disp: 90 tablet, Rfl: 3        Briana Leslie MD

## 2020-03-12 ENCOUNTER — TELEPHONE (OUTPATIENT)
Dept: UROLOGY | Facility: MEDICAL CENTER | Age: 80
End: 2020-03-12

## 2020-03-12 NOTE — TELEPHONE ENCOUNTER
Patient of Dr Shadi Hernandez seen in the [facility] office  Has Cysto tomorrow   Question regard time to take the Cipro  Please call prior to 2:30 if there is a specific time  Message can be left on her machine as well    She can be reached at [phone]  Thank you

## 2020-03-12 NOTE — TELEPHONE ENCOUNTER
Spoke with the patient; Recommended that she take the Cipro 1 hour before her procedure for best results ( approx  02:30 PM)  Patient understands and has no questions or concerns to be addressed at this time

## 2020-03-13 ENCOUNTER — PROCEDURE VISIT (OUTPATIENT)
Dept: UROLOGY | Facility: MEDICAL CENTER | Age: 80
End: 2020-03-13
Payer: MEDICARE

## 2020-03-13 VITALS
WEIGHT: 183 LBS | BODY MASS INDEX: 30.49 KG/M2 | SYSTOLIC BLOOD PRESSURE: 140 MMHG | HEART RATE: 101 BPM | HEIGHT: 65 IN | DIASTOLIC BLOOD PRESSURE: 70 MMHG

## 2020-03-13 DIAGNOSIS — N28.1 COMPLEX RENAL CYST: ICD-10-CM

## 2020-03-13 DIAGNOSIS — R31.21 ASYMPTOMATIC MICROSCOPIC HEMATURIA: Primary | ICD-10-CM

## 2020-03-13 LAB
SL AMB  POCT GLUCOSE, UA: ABNORMAL
SL AMB LEUKOCYTE ESTERASE,UA: ABNORMAL
SL AMB POCT BILIRUBIN,UA: ABNORMAL
SL AMB POCT BLOOD,UA: ABNORMAL
SL AMB POCT CLARITY,UA: CLEAR
SL AMB POCT COLOR,UA: YELLOW
SL AMB POCT KETONES,UA: ABNORMAL
SL AMB POCT NITRITE,UA: ABNORMAL
SL AMB POCT PH,UA: 5
SL AMB POCT SPECIFIC GRAVITY,UA: 1.02
SL AMB POCT URINE PROTEIN: ABNORMAL
SL AMB POCT UROBILINOGEN: 0.2

## 2020-03-13 PROCEDURE — 3077F SYST BP >= 140 MM HG: CPT | Performed by: UROLOGY

## 2020-03-13 PROCEDURE — 99211 OFF/OP EST MAY X REQ PHY/QHP: CPT | Performed by: UROLOGY

## 2020-03-13 PROCEDURE — 52000 CYSTOURETHROSCOPY: CPT | Performed by: UROLOGY

## 2020-03-13 PROCEDURE — 3066F NEPHROPATHY DOC TX: CPT | Performed by: UROLOGY

## 2020-03-13 PROCEDURE — 81003 URINALYSIS AUTO W/O SCOPE: CPT | Performed by: UROLOGY

## 2020-03-13 PROCEDURE — 3078F DIAST BP <80 MM HG: CPT | Performed by: UROLOGY

## 2020-03-13 PROCEDURE — 2022F DILAT RTA XM EVC RTNOPTHY: CPT | Performed by: UROLOGY

## 2020-03-13 PROCEDURE — 3008F BODY MASS INDEX DOCD: CPT | Performed by: UROLOGY

## 2020-03-13 PROCEDURE — 1160F RVW MEDS BY RX/DR IN RCRD: CPT | Performed by: UROLOGY

## 2020-03-13 PROCEDURE — 4040F PNEUMOC VAC/ADMIN/RCVD: CPT | Performed by: UROLOGY

## 2020-03-13 NOTE — LETTER
2020     Rufino Aguirre MD  [address]    Patient: Neville Dumont   YOB: 1940   Date of Visit: 3/13/2020       Dear Dr Timothy Hernandez: Thank you for referring Neville Dumont to me for evaluation  Below are my notes for this consultation  If you have questions, please do not hesitate to call me  I look forward to following your patient along with you  Sincerely,        Neymar Todd MD        CC: No Recipients  Neymar Todd MD  3/13/2020  4:04 PM  Sign at close encounter  [address] [facility]  [address], [address] [facility]  [phone]  www  [facility]  org      NAME: Amanda Laws  AGE: 67 y o  SEX: female  : 1940   MRN: [id number]    DATE: 3/13/2020  TIME: 2:10 PM    Assessment and Plan:   benign essential microscopic hematuria, reassured  Complex left renal cyst, recheck renal ultrasound with visit in August                Chief Complaint   No chief complaint on file  History of Present Illness   Microscopic hematuria with a complex left mid pole renal cyst 2 2 cm with slightly thick vascular septation:  Here for cystoscopy  We plan on reimaging the complex renal cyst in 2020  Cystoscopy  Date/Time: 3/13/2020 2:13 PM  Performed by: Neymar Todd MD  Authorized by: Neymar Todd MD     Procedure details: cystoscopy    Additional Procedure Details: Cystoscopy Procedure Note        Pre-operative Diagnosis:  Microscopic hematuria    Post-operative Diagnosis:  Same, benign essential microscopic hematuria    Procedure: Flexible cystoscopy    Surgeon: Charbel Miles MD    Anesthesia: 1% Xylocaine per urethra    EBL: Minimal    Complications: none    Procedure Details   The risks, benefits, complications, treatment options, and expected outcomes were discussed with the patient   The patient concurred with the proposed plan, giving informed consent  Cystoscopy was performed today under local anesthesia, using sterile technique  The patient was placed in the dorsal lithotomy position, prepped with Betadine, and draped in the usual sterile fashion  The flexible cystocope was used to inspect both the urethra and bladder    Findings:  Urethra: normal without stricture    Bladder:  Smooth, not trabeculated and there were no stones tumors or other lesions  The orifices were orthotopic and intact  Specimens:  None                 Complications:  None           Disposition: To home            Condition:  Stable             The following portions of the patient's history were reviewed and updated as appropriate: allergies, current medications, past family history, past medical history, past social history, past surgical history and problem list   Past Medical History:   Diagnosis Date    Hypertension      Past Surgical History:   Procedure Laterality Date    TUBAL LIGATION Bilateral      shoulder  Review of Systems   Review of Systems    Active Problem List     Patient Active Problem List   Diagnosis    Hyperlipidemia    Diabetes ([facility] [address] )    Chronic renal insufficiency    Fatty liver    Osteoporosis    Diverticulosis    Peripheral vascular disease of lower extremity ([facility] [address] )    Multiple renal cysts    Liver cyst    Glaucoma    Cataract    Colonic polyp    Hearing loss    GERD with esophagitis    Essential hypertension    Peripheral vascular disease ([facility] [address] )    Encounter for screening mammogram for breast cancer    Over weight    Cyst of right kidney    Need for immunization against influenza    Leukopenia    Low vitamin D level    Abnormal urine cytology    Asymptomatic microscopic hematuria       Objective   There were no vitals taken for this visit      Physical Exam        Current Medications     Current Outpatient Medications:     ASPIRIN 81 PO, , Disp: , Rfl:     bimatoprost (LUMIGAN) 0 01 % ophthalmic drops, Apply 1 drop to eye Daily, Disp: , Rfl:     brimonidine (ALPHAGAN P) 0 1 %, Apply 1 drop to eye every 12 (twelve) hours, Disp: , Rfl:     Calcium Carbonate-Vitamin D (CALTRATE 600+D PO), Take 2 tablets by mouth daily , Disp: , Rfl:     cholecalciferol (VITAMIN D3) 1,000 units tablet, Take 1 tablet (1,000 Units total) by mouth daily, Disp: 30 tablet, Rfl: 2    diltiazem (CARDIZEM LA) 240 MG 24 hr tablet, Take 1 tablet (240 mg total) by mouth daily, Disp: 90 tablet, Rfl: 0    ezetimibe (ZETIA) 10 mg tablet, Take 1 tablet (10 mg total) by mouth daily, Disp: 90 tablet, Rfl: 3    Multiple Vitamins-Minerals (CENTRUM SILVER ULTRA WOMENS PO), Take 1 tablet by mouth daily, Disp: , Rfl:     olmesartan (BENICAR) 20 mg tablet, Take 1 tablet (20 mg total) by mouth daily, Disp: 90 tablet, Rfl: 3    rosuvastatin (CRESTOR) 10 MG tablet, Take 1 tablet (10 mg total) by mouth daily, Disp: 90 tablet, Rfl: 3        Micky Shelton MD

## 2020-03-13 NOTE — PROGRESS NOTES
[address] [facility]  [facility]  [address]  [place], [address] [place] [address]  [phone]  www  [facility]  org      NAME: Marlyn Laws  AGE: 67 y o  SEX: female  : 1940   MRN: [id number]    DATE: 3/13/2020  TIME: 2:10 PM    Assessment and Plan:   benign essential microscopic hematuria, reassured  Complex left renal cyst, recheck renal ultrasound with visit in August                Chief Complaint   No chief complaint on file  History of Present Illness   Microscopic hematuria with a complex left mid pole renal cyst 2 2 cm with slightly thick vascular septation:  Here for cystoscopy  We plan on reimaging the complex renal cyst in 2020  Cystoscopy  Date/Time: 3/13/2020 2:13 PM  Performed by: Harriet Jiménez MD  Authorized by: Harriet Jiménez MD     Procedure details: cystoscopy    Additional Procedure Details: Cystoscopy Procedure Note        Pre-operative Diagnosis:  Microscopic hematuria    Post-operative Diagnosis:  Same, benign essential microscopic hematuria    Procedure: Flexible cystoscopy    Surgeon: Kimberly Lira MD    Anesthesia: 1% Xylocaine per urethra    EBL: Minimal    Complications: none    Procedure Details   The risks, benefits, complications, treatment options, and expected outcomes were discussed with the patient  The patient concurred with the proposed plan, giving informed consent  Cystoscopy was performed today under local anesthesia, using sterile technique  The patient was placed in the dorsal lithotomy position, prepped with Betadine, and draped in the usual sterile fashion  The flexible cystocope was used to inspect both the urethra and bladder    Findings:  Urethra: normal without stricture    Bladder:  Smooth, not trabeculated and there were no stones tumors or other lesions  The orifices were orthotopic and intact             Specimens:  None                 Complications:  None           Disposition: To home Condition:  Stable             The following portions of the patient's history were reviewed and updated as appropriate: allergies, current medications, past family history, past medical history, past social history, past surgical history and problem list   Past Medical History:   Diagnosis Date    Hypertension      Past Surgical History:   Procedure Laterality Date    TUBAL LIGATION Bilateral      shoulder  Review of Systems   Review of Systems    Active Problem List     Patient Active Problem List   Diagnosis    Hyperlipidemia    Diabetes ([facility] )    Chronic renal insufficiency    Fatty liver    Osteoporosis    Diverticulosis    Peripheral vascular disease of lower extremity ([facility] )    Multiple renal cysts    Liver cyst    Glaucoma    Cataract    Colonic polyp    Hearing loss    GERD with esophagitis    Essential hypertension    Peripheral vascular disease ([facility]    Encounter for screening mammogram for breast cancer    Over weight    Cyst of right kidney    Need for immunization against influenza    Leukopenia    Low vitamin D level    Abnormal urine cytology    Asymptomatic microscopic hematuria       Objective   There were no vitals taken for this visit      Physical Exam        Current Medications     Current Outpatient Medications:     ASPIRIN 81 PO, , Disp: , Rfl:     bimatoprost (LUMIGAN) 0 01 % ophthalmic drops, Apply 1 drop to eye Daily, Disp: , Rfl:     brimonidine (ALPHAGAN P) 0 1 %, Apply 1 drop to eye every 12 (twelve) hours, Disp: , Rfl:     Calcium Carbonate-Vitamin D (CALTRATE 600+D PO), Take 2 tablets by mouth daily , Disp: , Rfl:     cholecalciferol (VITAMIN D3) 1,000 units tablet, Take 1 tablet (1,000 Units total) by mouth daily, Disp: 30 tablet, Rfl: 2    diltiazem (CARDIZEM LA) 240 MG 24 hr tablet, Take 1 tablet (240 mg total) by mouth daily, Disp: 90 tablet, Rfl: 0    ezetimibe (ZETIA) 10 mg tablet, Take 1 tablet (10 mg total) by mouth daily, Disp: 90 tablet, Rfl: 3    Multiple Vitamins-Minerals (CENTRUM SILVER ULTRA WOMENS PO), Take 1 tablet by mouth daily, Disp: , Rfl:     olmesartan (BENICAR) 20 mg tablet, Take 1 tablet (20 mg total) by mouth daily, Disp: 90 tablet, Rfl: 3    rosuvastatin (CRESTOR) 10 MG tablet, Take 1 tablet (10 mg total) by mouth daily, Disp: 90 tablet, Rfl: 3        Marcus Turcios MD

## 2020-04-15 ENCOUNTER — OFFICE VISIT (OUTPATIENT)
Dept: FAMILY MEDICINE CLINIC | Facility: CLINIC | Age: 80
End: 2020-04-15
Payer: MEDICARE

## 2020-04-15 VITALS
DIASTOLIC BLOOD PRESSURE: 74 MMHG | HEART RATE: 82 BPM | OXYGEN SATURATION: 98 % | WEIGHT: 181.6 LBS | HEIGHT: 65 IN | TEMPERATURE: 98.2 F | SYSTOLIC BLOOD PRESSURE: 112 MMHG | BODY MASS INDEX: 30.26 KG/M2

## 2020-04-15 DIAGNOSIS — Z12.39 SCREENING FOR BREAST CANCER: ICD-10-CM

## 2020-04-15 DIAGNOSIS — I10 ESSENTIAL HYPERTENSION: ICD-10-CM

## 2020-04-15 DIAGNOSIS — R79.89 LOW VITAMIN D LEVEL: ICD-10-CM

## 2020-04-15 DIAGNOSIS — Z11.59 NEED FOR HEPATITIS C SCREENING TEST: ICD-10-CM

## 2020-04-15 DIAGNOSIS — R31.21 ASYMPTOMATIC MICROSCOPIC HEMATURIA: ICD-10-CM

## 2020-04-15 DIAGNOSIS — M81.0 OSTEOPOROSIS WITHOUT CURRENT PATHOLOGICAL FRACTURE, UNSPECIFIED OSTEOPOROSIS TYPE: ICD-10-CM

## 2020-04-15 DIAGNOSIS — K76.89 LIVER CYST: ICD-10-CM

## 2020-04-15 DIAGNOSIS — N18.9 CHRONIC RENAL IMPAIRMENT, UNSPECIFIED CKD STAGE: ICD-10-CM

## 2020-04-15 DIAGNOSIS — Z00.00 MEDICARE ANNUAL WELLNESS VISIT, SUBSEQUENT: Primary | ICD-10-CM

## 2020-04-15 DIAGNOSIS — E78.00 PURE HYPERCHOLESTEROLEMIA: ICD-10-CM

## 2020-04-15 DIAGNOSIS — N28.1 ACQUIRED RENAL CYST OF LEFT KIDNEY: ICD-10-CM

## 2020-04-15 DIAGNOSIS — I73.9 PERIPHERAL VASCULAR DISEASE OF LOWER EXTREMITY (HCC): ICD-10-CM

## 2020-04-15 DIAGNOSIS — E11.69 TYPE 2 DIABETES MELLITUS WITH OTHER SPECIFIED COMPLICATION, WITHOUT LONG-TERM CURRENT USE OF INSULIN (HCC): ICD-10-CM

## 2020-04-15 DIAGNOSIS — D72.819 LEUKOPENIA, UNSPECIFIED TYPE: ICD-10-CM

## 2020-04-15 PROCEDURE — 99214 OFFICE O/P EST MOD 30 MIN: CPT | Performed by: FAMILY MEDICINE

## 2020-04-15 PROCEDURE — 3008F BODY MASS INDEX DOCD: CPT | Performed by: FAMILY MEDICINE

## 2020-04-15 PROCEDURE — 4040F PNEUMOC VAC/ADMIN/RCVD: CPT | Performed by: FAMILY MEDICINE

## 2020-04-15 PROCEDURE — G0439 PPPS, SUBSEQ VISIT: HCPCS | Performed by: FAMILY MEDICINE

## 2020-04-15 PROCEDURE — 2022F DILAT RTA XM EVC RTNOPTHY: CPT | Performed by: FAMILY MEDICINE

## 2020-04-15 PROCEDURE — 1160F RVW MEDS BY RX/DR IN RCRD: CPT | Performed by: FAMILY MEDICINE

## 2020-04-15 PROCEDURE — 3074F SYST BP LT 130 MM HG: CPT | Performed by: FAMILY MEDICINE

## 2020-04-15 PROCEDURE — 1036F TOBACCO NON-USER: CPT | Performed by: FAMILY MEDICINE

## 2020-04-15 PROCEDURE — 1125F AMNT PAIN NOTED PAIN PRSNT: CPT | Performed by: FAMILY MEDICINE

## 2020-04-15 PROCEDURE — 3078F DIAST BP <80 MM HG: CPT | Performed by: FAMILY MEDICINE

## 2020-04-15 PROCEDURE — 3066F NEPHROPATHY DOC TX: CPT | Performed by: FAMILY MEDICINE

## 2020-04-15 PROCEDURE — 1170F FXNL STATUS ASSESSED: CPT | Performed by: FAMILY MEDICINE

## 2020-04-15 RX ORDER — DILTIAZEM HYDROCHLORIDE EXTENDED-RELEASE TABLETS 240 MG/1
240 TABLET, EXTENDED RELEASE ORAL DAILY
Qty: 90 TABLET | Refills: 1 | Status: SHIPPED | OUTPATIENT
Start: 2020-04-15 | End: 2020-04-15 | Stop reason: SDUPTHER

## 2020-04-15 RX ORDER — DILTIAZEM HYDROCHLORIDE EXTENDED-RELEASE TABLETS 240 MG/1
240 TABLET, EXTENDED RELEASE ORAL DAILY
Qty: 90 TABLET | Refills: 1 | Status: SHIPPED | OUTPATIENT
Start: 2020-04-15 | End: 2020-10-14 | Stop reason: SDUPTHER

## 2020-07-09 LAB
CREAT ?TM UR-SCNC: 103 UMOL/L
HBA1C MFR BLD HPLC: 6.1 %
HCV AB SER-ACNC: NEGATIVE

## 2020-07-15 ENCOUNTER — OFFICE VISIT (OUTPATIENT)
Dept: FAMILY MEDICINE CLINIC | Facility: CLINIC | Age: 80
End: 2020-07-15
Payer: MEDICARE

## 2020-07-15 VITALS
TEMPERATURE: 98.3 F | WEIGHT: 181 LBS | HEART RATE: 96 BPM | BODY MASS INDEX: 30.16 KG/M2 | DIASTOLIC BLOOD PRESSURE: 80 MMHG | HEIGHT: 65 IN | SYSTOLIC BLOOD PRESSURE: 132 MMHG | OXYGEN SATURATION: 98 %

## 2020-07-15 DIAGNOSIS — E11.69 TYPE 2 DIABETES MELLITUS WITH OTHER SPECIFIED COMPLICATION, WITHOUT LONG-TERM CURRENT USE OF INSULIN (HCC): ICD-10-CM

## 2020-07-15 DIAGNOSIS — M81.0 OSTEOPOROSIS WITHOUT CURRENT PATHOLOGICAL FRACTURE, UNSPECIFIED OSTEOPOROSIS TYPE: ICD-10-CM

## 2020-07-15 DIAGNOSIS — D72.819 LEUKOPENIA, UNSPECIFIED TYPE: Primary | ICD-10-CM

## 2020-07-15 DIAGNOSIS — R79.89 LOW VITAMIN D LEVEL: ICD-10-CM

## 2020-07-15 DIAGNOSIS — E78.00 PURE HYPERCHOLESTEROLEMIA: ICD-10-CM

## 2020-07-15 DIAGNOSIS — N18.9 CHRONIC RENAL IMPAIRMENT, UNSPECIFIED CKD STAGE: ICD-10-CM

## 2020-07-15 DIAGNOSIS — R31.21 ASYMPTOMATIC MICROSCOPIC HEMATURIA: ICD-10-CM

## 2020-07-15 DIAGNOSIS — I73.9 PERIPHERAL VASCULAR DISEASE OF LOWER EXTREMITY (HCC): ICD-10-CM

## 2020-07-15 DIAGNOSIS — I10 ESSENTIAL HYPERTENSION: ICD-10-CM

## 2020-07-15 PROCEDURE — 1036F TOBACCO NON-USER: CPT | Performed by: FAMILY MEDICINE

## 2020-07-15 PROCEDURE — 1160F RVW MEDS BY RX/DR IN RCRD: CPT | Performed by: FAMILY MEDICINE

## 2020-07-15 PROCEDURE — 3008F BODY MASS INDEX DOCD: CPT | Performed by: FAMILY MEDICINE

## 2020-07-15 PROCEDURE — 99214 OFFICE O/P EST MOD 30 MIN: CPT | Performed by: FAMILY MEDICINE

## 2020-07-15 PROCEDURE — 3066F NEPHROPATHY DOC TX: CPT | Performed by: FAMILY MEDICINE

## 2020-07-15 PROCEDURE — 4040F PNEUMOC VAC/ADMIN/RCVD: CPT | Performed by: FAMILY MEDICINE

## 2020-07-15 PROCEDURE — 3044F HG A1C LEVEL LT 7.0%: CPT | Performed by: FAMILY MEDICINE

## 2020-07-15 PROCEDURE — 3075F SYST BP GE 130 - 139MM HG: CPT | Performed by: FAMILY MEDICINE

## 2020-07-15 PROCEDURE — 3079F DIAST BP 80-89 MM HG: CPT | Performed by: FAMILY MEDICINE

## 2020-07-15 PROCEDURE — 2022F DILAT RTA XM EVC RTNOPTHY: CPT | Performed by: FAMILY MEDICINE

## 2020-07-15 NOTE — PROGRESS NOTES
Assessment/Plan:       No problem-specific Assessment & Plan notes found for this encounter  Diagnoses and all orders for this visit:    Leukopenia, unspecified type  Comments:  Resolved  Check CBC with next office visit    Essential hypertension  Comments:  Or repeat is okay control to continue monitoring blood pressure at home and call if it is 130/80 or higher    Type 2 diabetes mellitus with other specified complication, without long-term current use of insulin (HCC)  Comments:  Good control to watch sweet and carbohydrate intake    Asymptomatic microscopic hematuria  Comments: To follow with Urology    Chronic renal impairment, unspecified CKD stage  Comments:  Little worse most likely secondary to dehydration advised to drink enough fluid  Adult work in the yard when it is hot  Orders:  -     Basic metabolic panel; Future    Pure hypercholesterolemia  Comments:  Good control  To follow with low-fat diet    Peripheral vascular disease of lower extremity ([address] )  Comments:  Asymptomatic  To call if she had or claudication or change in the color of her feet  Check arterial Doppler of the legs 09/2020    Osteoporosis without current pathological fracture, unspecified osteoporosis type  Comments:  Await bone density    Low vitamin D level  Comments:  Compensated  Continue vitamin-D supplement        Patient Instructions   To  follow up with test results      Orders Placed This Encounter   Procedures    Basic metabolic panel     This is a patient instruction: Patient fasting for 8 hours or longer recommended  Standing Status:   Future     Standing Expiration Date:   7/15/2021         Subjective:     Patient ID: Bob Simons is a 67 y o  female      HPI   Hypertension patient stated her blood pressure at home is normal is 130/80 or less  But she when she comes to the doctor office she get little nervous and she does have white coat syndrome    Denied headache or dizziness admit to regular salt intake  Hyperlipidemia admit to regular fat intake denied chest pain  Denied side effect with the Crestor  Chronic renal failure patient stated she has been working on her yard and she has been sweating a lot  And that is probably her kidney test is little worse recently  Denied edema or problem with urination  Microscopic hematuria  She did see Dr Nikki Chun she had cystoscopy  Denied hematuria she has a follow-up with him in August  Diabetes  Does not check blood sugar at home denied polyuria or polydipsia admit to watching her her sweet and carbohydrate intake   Peripheral vascular disease  Patient denied claudication or change in the color of her feet  Test results  Lab done on 07/08/2020 discussed  Bone density and mammogram scheduled at the end of this month    Review of Systems   Constitutional: Negative for appetite change and fatigue  HENT: Negative for ear pain, tinnitus, trouble swallowing and voice change  Eyes: Negative for photophobia, pain and visual disturbance  Respiratory: Negative for cough, chest tightness and wheezing  Cardiovascular: Negative for chest pain, palpitations and leg swelling  Gastrointestinal: Negative for abdominal distention, abdominal pain, anal bleeding, constipation, diarrhea, nausea and rectal pain  Endocrine: Negative for cold intolerance, heat intolerance, polydipsia and polyuria  Genitourinary: Negative for decreased urine volume, difficulty urinating, dysuria, flank pain, frequency, hematuria and urgency  Musculoskeletal: Negative for arthralgias, back pain, gait problem, myalgias and neck pain  Skin: Negative for pallor and rash  Allergic/Immunologic: Negative for immunocompromised state  Neurological: Negative for dizziness, seizures, syncope and speech difficulty  Hematological: Negative for adenopathy  Does not bruise/bleed easily  Psychiatric/Behavioral: Negative for agitation, confusion and hallucinations   The patient is not nervous/anxious  Objective:     Physical Exam   Constitutional: She is oriented to person, place, and time  She appears well-developed and well-nourished  No distress  HENT:   Head: Normocephalic and atraumatic  Eyes: Pupils are equal, round, and reactive to light  Conjunctivae and EOM are normal  No scleral icterus  Neck: No JVD present  No thyromegaly present  Cardiovascular: Normal rate, regular rhythm and normal heart sounds  Pulses are weak pulses  No murmur heard  Pulses:       Dorsalis pedis pulses are 1+ on the right side, and 0 on the left side  Posterior tibial pulses are 1+ on the right side, and 0 on the left side  Extremities  No edema   Pulmonary/Chest: Effort normal and breath sounds normal    Abdominal: Soft  Bowel sounds are normal  She exhibits no distension and no mass  There is no tenderness  There is no rebound and no guarding  No hernia  Feet:   Right Foot:   Skin Integrity: Positive for callus  Negative for ulcer, skin breakdown, erythema, warmth or dry skin  Left Foot:   Skin Integrity: Positive for callus  Negative for ulcer, skin breakdown, erythema, warmth or dry skin  Lymphadenopathy:     She has no cervical adenopathy  Neurological: She is alert and oriented to person, place, and time  No cranial nerve deficit  She exhibits normal muscle tone  Coordination normal    Gait is normal   Skin: No rash noted  Psychiatric: She has a normal mood and affect  Her behavior is normal  Judgment and thought content normal    BMI Counseling: Body mass index is 30 12 kg/m²  The BMI is above normal  Nutrition recommendations include decreasing portion sizes, moderation in carbohydrate intake, reducing intake of saturated and trans fat and reducing intake of cholesterol         Diabetic Foot Exam    Right Foot/Ankle   Right Foot Inspection  Skin Exam: skin normal, skin intact, callus and callus no dry skin, no warmth, no erythema, no maceration, no abnormal color, no pre-ulcer and no ulcer                          Toe Exam: tendernessno swelling and  no right toe deformity  Sensory       Monofilament testing: intact  Vascular    The right DP pulse is 1+  The right PT pulse is 1+  Left Foot/Ankle  Left Foot Inspection  Skin Exam: skin normal, skin intact and callusno dry skin, no warmth, no erythema, no maceration, normal color, no pre-ulcer and no ulcer                         Toe Exam: left toe deformityno swelling, no tenderness and no erythema                   Sensory       Monofilament: intact  Vascular    The left DP pulse is 0  The left PT pulse is 0  Assign Risk Category:  Deformity present;  No loss of protective sensation; Weak pulses       Risk: 1

## 2020-07-29 ENCOUNTER — HOSPITAL ENCOUNTER (OUTPATIENT)
Dept: MAMMOGRAPHY | Facility: CLINIC | Age: 80
Discharge: HOME | End: 2020-07-29
Payer: MEDICARE

## 2020-07-29 ENCOUNTER — HOSPITAL ENCOUNTER (OUTPATIENT)
Dept: BONE DENSITY | Facility: CLINIC | Age: 80
Discharge: HOME | End: 2020-07-29
Payer: MEDICARE

## 2020-07-29 VITALS — HEIGHT: 65 IN | BODY MASS INDEX: 30.16 KG/M2 | WEIGHT: 181 LBS

## 2020-07-29 DIAGNOSIS — Z12.39 SCREENING FOR BREAST CANCER: ICD-10-CM

## 2020-07-29 DIAGNOSIS — M81.0 OSTEOPOROSIS WITHOUT CURRENT PATHOLOGICAL FRACTURE, UNSPECIFIED OSTEOPOROSIS TYPE: ICD-10-CM

## 2020-07-29 DIAGNOSIS — Z12.31 ENCOUNTER FOR SCREENING MAMMOGRAM FOR BREAST CANCER: ICD-10-CM

## 2020-07-29 PROCEDURE — 77080 DXA BONE DENSITY AXIAL: CPT

## 2020-07-29 PROCEDURE — 77067 SCR MAMMO BI INCL CAD: CPT

## 2020-07-29 PROCEDURE — 77063 BREAST TOMOSYNTHESIS BI: CPT

## 2020-08-19 ENCOUNTER — HOSPITAL ENCOUNTER (OUTPATIENT)
Dept: ULTRASOUND IMAGING | Facility: HOSPITAL | Age: 80
Discharge: HOME | End: 2020-08-19
Attending: UROLOGY
Payer: MEDICARE

## 2020-08-19 DIAGNOSIS — N28.1 COMPLEX RENAL CYST: ICD-10-CM

## 2020-08-19 PROCEDURE — 76770 US EXAM ABDO BACK WALL COMP: CPT

## 2020-08-21 ENCOUNTER — TELEPHONE (OUTPATIENT)
Dept: UROLOGY | Facility: MEDICAL CENTER | Age: 80
End: 2020-08-21

## 2020-08-21 NOTE — TELEPHONE ENCOUNTER
Patient of Dr Rosie Mccauley seen in the [facility] office  Received call from Radiology   The results for the Ultrasound of the Kidney and bladder are now available to view  Please be advised    Thank you

## 2020-09-01 ENCOUNTER — OFFICE VISIT (OUTPATIENT)
Dept: UROLOGY | Facility: MEDICAL CENTER | Age: 80
End: 2020-09-01
Payer: MEDICARE

## 2020-09-01 VITALS
WEIGHT: 181 LBS | BODY MASS INDEX: 30.16 KG/M2 | HEART RATE: 89 BPM | SYSTOLIC BLOOD PRESSURE: 144 MMHG | HEIGHT: 65 IN | TEMPERATURE: 98.8 F | DIASTOLIC BLOOD PRESSURE: 86 MMHG

## 2020-09-01 DIAGNOSIS — R31.21 ASYMPTOMATIC MICROSCOPIC HEMATURIA: Primary | ICD-10-CM

## 2020-09-01 DIAGNOSIS — N28.1 COMPLEX RENAL CYST: ICD-10-CM

## 2020-09-01 LAB
SL AMB  POCT GLUCOSE, UA: ABNORMAL
SL AMB LEUKOCYTE ESTERASE,UA: ABNORMAL
SL AMB POCT BILIRUBIN,UA: ABNORMAL
SL AMB POCT BLOOD,UA: ABNORMAL
SL AMB POCT CLARITY,UA: CLEAR
SL AMB POCT COLOR,UA: YELLOW
SL AMB POCT KETONES,UA: ABNORMAL
SL AMB POCT NITRITE,UA: ABNORMAL
SL AMB POCT PH,UA: 5
SL AMB POCT SPECIFIC GRAVITY,UA: 1
SL AMB POCT URINE PROTEIN: ABNORMAL
SL AMB POCT UROBILINOGEN: 0.2

## 2020-09-01 PROCEDURE — 99213 OFFICE O/P EST LOW 20 MIN: CPT | Performed by: UROLOGY

## 2020-09-01 PROCEDURE — 81003 URINALYSIS AUTO W/O SCOPE: CPT | Performed by: UROLOGY

## 2020-09-01 RX ORDER — MELATONIN
1000 DAILY
COMMUNITY

## 2020-09-01 NOTE — PATIENT INSTRUCTIONS
Today you just have trace blood in your urine  This is okay because we have worked it up  Your cyst is stable  We will look at it again in 1 year with a renal ultrasound

## 2020-09-01 NOTE — PROGRESS NOTES
[address] [facility]  [facility]  [address] [facility], [address] [place] [address]  [phone]  www  [facility]  org      NAME: Marilu Laws  AGE: 67 y o  SEX: female  : 1940   MRN: [id number]    DATE: 2020  TIME: 1:19 PM    Assessment and Plan:    Complex left renal cyst, stable at 6 months  Recheck a renal ultrasound in 1 year  Asymptomatic benign essential microscopic hematuria:  Trace blood on today's dipstick  She has had a negative workup for this  Chief Complaint   No chief complaint on file  History of Present Illness     Complex left renal cyst:  Here for recheck with renal ultrasound  This is a 2 2 cm cyst with slightly thick vascular septation  She underwent cystoscopy for benign essential microscopic hematuria 2020  Renal ultrasound 2020 shows no suspicious masses, no hydronephrosis and no stones  The cyst is unchanged  Her general health has been good  No problems voiding  The following portions of the patient's history were reviewed and updated as appropriate: allergies, current medications, past family history, past medical history, past social history, past surgical history and problem list   Past Medical History:   Diagnosis Date    Hypertension      Past Surgical History:   Procedure Laterality Date    TUBAL LIGATION Bilateral      shoulder  Review of Systems   Review of Systems   Genitourinary: Negative          Active Problem List     Patient Active Problem List   Diagnosis    Hyperlipidemia    Diabetes ([address] )    Chronic renal insufficiency    Fatty liver    Osteoporosis    Diverticulosis    Peripheral vascular disease of lower extremity (HCC)    Multiple renal cysts    Liver cyst    Glaucoma    Cataract    Colonic polyp    Hearing loss    GERD with esophagitis    Essential hypertension    Peripheral vascular disease ([address] )    Encounter for screening mammogram for breast cancer    Over weight    Cyst of right kidney    Need for immunization against influenza    Leukopenia    Low vitamin D level    Abnormal urine cytology    Asymptomatic microscopic hematuria       Objective   /86   Pulse 89   Temp 98 8 °F (37 1 °C)   Ht 5' 5" (1 651 m)   Wt 82 1 kg (181 lb)   BMI 30 12 kg/m²     Physical Exam  Constitutional:       Appearance: Normal appearance  HENT:      Head: Normocephalic and atraumatic  Eyes:      Extraocular Movements: Extraocular movements intact  Pulmonary:      Effort: Pulmonary effort is normal    Musculoskeletal: Normal range of motion  Neurological:      General: No focal deficit present  Mental Status: She is alert and oriented to person, place, and time  Mental status is at baseline  Psychiatric:         Mood and Affect: Mood normal          Behavior: Behavior normal          Thought Content:  Thought content normal          Judgment: Judgment normal              Current Medications     Current Outpatient Medications:     ASPIRIN 81 PO, , Disp: , Rfl:     bimatoprost (LUMIGAN) 0 01 % ophthalmic drops, Apply 1 drop to eye Daily, Disp: , Rfl:     brimonidine (ALPHAGAN P) 0 1 %, Apply 1 drop to eye every 12 (twelve) hours, Disp: , Rfl:     Calcium Carbonate-Vitamin D (CALTRATE 600+D PO), Take 2 tablets by mouth daily , Disp: , Rfl:     cholecalciferol (VITAMIN D3) 1,000 units tablet, Take 1,000 Units by mouth daily, Disp: , Rfl:     diltiazem (CARDIZEM LA) 240 MG 24 hr tablet, Take 1 tablet (240 mg total) by mouth daily, Disp: 90 tablet, Rfl: 1    ezetimibe (ZETIA) 10 mg tablet, Take 1 tablet (10 mg total) by mouth daily, Disp: 90 tablet, Rfl: 3    Multiple Vitamins-Minerals (CENTRUM SILVER ULTRA WOMENS PO), Take 1 tablet by mouth daily, Disp: , Rfl:     olmesartan (BENICAR) 20 mg tablet, Take 1 tablet (20 mg total) by mouth daily, Disp: 90 tablet, Rfl: 3    rosuvastatin (CRESTOR) 10 MG tablet, Take 1 tablet (10 mg total) by mouth daily, Disp: 90 tablet, Rfl: 3        Diop Delay, MD

## 2020-10-09 ENCOUNTER — TELEPHONE (OUTPATIENT)
Dept: FAMILY MEDICINE CLINIC | Facility: CLINIC | Age: 80
End: 2020-10-09

## 2020-10-09 DIAGNOSIS — N18.9 CHRONIC RENAL IMPAIRMENT, UNSPECIFIED CKD STAGE: Primary | ICD-10-CM

## 2020-10-12 ENCOUNTER — TELEPHONE (OUTPATIENT)
Dept: FAMILY MEDICINE CLINIC | Facility: CLINIC | Age: 80
End: 2020-10-12

## 2020-10-14 ENCOUNTER — TELEPHONE (OUTPATIENT)
Dept: FAMILY MEDICINE CLINIC | Facility: CLINIC | Age: 80
End: 2020-10-14

## 2020-10-14 ENCOUNTER — OFFICE VISIT (OUTPATIENT)
Dept: FAMILY MEDICINE CLINIC | Facility: CLINIC | Age: 80
End: 2020-10-14
Payer: MEDICARE

## 2020-10-14 VITALS
WEIGHT: 179.2 LBS | TEMPERATURE: 97.6 F | HEART RATE: 85 BPM | DIASTOLIC BLOOD PRESSURE: 78 MMHG | HEIGHT: 65 IN | OXYGEN SATURATION: 98 % | SYSTOLIC BLOOD PRESSURE: 122 MMHG | BODY MASS INDEX: 29.85 KG/M2

## 2020-10-14 DIAGNOSIS — E11.69 TYPE 2 DIABETES MELLITUS WITH OTHER SPECIFIED COMPLICATION, WITHOUT LONG-TERM CURRENT USE OF INSULIN (HCC): ICD-10-CM

## 2020-10-14 DIAGNOSIS — N18.9 CHRONIC RENAL IMPAIRMENT, UNSPECIFIED CKD STAGE: ICD-10-CM

## 2020-10-14 DIAGNOSIS — E78.00 PURE HYPERCHOLESTEROLEMIA: Primary | ICD-10-CM

## 2020-10-14 DIAGNOSIS — I10 ESSENTIAL HYPERTENSION: ICD-10-CM

## 2020-10-14 DIAGNOSIS — Z23 FLU VACCINE NEED: ICD-10-CM

## 2020-10-14 PROCEDURE — G0008 ADMIN INFLUENZA VIRUS VAC: HCPCS

## 2020-10-14 PROCEDURE — 99214 OFFICE O/P EST MOD 30 MIN: CPT | Performed by: FAMILY MEDICINE

## 2020-10-14 PROCEDURE — 90662 IIV NO PRSV INCREASED AG IM: CPT

## 2020-10-14 RX ORDER — EZETIMIBE 10 MG/1
10 TABLET ORAL DAILY
Qty: 90 TABLET | Refills: 3 | Status: SHIPPED | OUTPATIENT
Start: 2020-10-14 | End: 2021-10-21 | Stop reason: SDUPTHER

## 2020-10-14 RX ORDER — OLMESARTAN MEDOXOMIL 20 MG/1
20 TABLET ORAL DAILY
Qty: 90 TABLET | Refills: 3 | Status: SHIPPED | OUTPATIENT
Start: 2020-10-14 | End: 2021-10-21 | Stop reason: SDUPTHER

## 2020-10-14 RX ORDER — DILTIAZEM HYDROCHLORIDE EXTENDED-RELEASE TABLETS 240 MG/1
240 TABLET, EXTENDED RELEASE ORAL DAILY
Qty: 90 TABLET | Refills: 1 | Status: SHIPPED | OUTPATIENT
Start: 2020-10-14 | End: 2021-01-13 | Stop reason: SDUPTHER

## 2020-10-14 RX ORDER — ROSUVASTATIN CALCIUM 10 MG/1
10 TABLET, COATED ORAL DAILY
Qty: 90 TABLET | Refills: 3 | Status: SHIPPED | OUTPATIENT
Start: 2020-10-14 | End: 2021-10-21 | Stop reason: SDUPTHER

## 2020-10-22 ENCOUNTER — TELEPHONE (OUTPATIENT)
Dept: FAMILY MEDICINE CLINIC | Facility: CLINIC | Age: 80
End: 2020-10-22

## 2021-01-06 LAB — HBA1C MFR BLD HPLC: 6.2 %

## 2021-01-13 ENCOUNTER — OFFICE VISIT (OUTPATIENT)
Dept: FAMILY MEDICINE CLINIC | Facility: CLINIC | Age: 81
End: 2021-01-13
Payer: MEDICARE

## 2021-01-13 VITALS
BODY MASS INDEX: 30.35 KG/M2 | SYSTOLIC BLOOD PRESSURE: 130 MMHG | HEIGHT: 65 IN | TEMPERATURE: 97.7 F | RESPIRATION RATE: 18 BRPM | DIASTOLIC BLOOD PRESSURE: 70 MMHG | OXYGEN SATURATION: 97 % | WEIGHT: 182.2 LBS | HEART RATE: 81 BPM

## 2021-01-13 DIAGNOSIS — E78.00 PURE HYPERCHOLESTEROLEMIA: ICD-10-CM

## 2021-01-13 DIAGNOSIS — D72.819 LEUKOPENIA, UNSPECIFIED TYPE: ICD-10-CM

## 2021-01-13 DIAGNOSIS — R79.89 LOW VITAMIN D LEVEL: ICD-10-CM

## 2021-01-13 DIAGNOSIS — E11.69 TYPE 2 DIABETES MELLITUS WITH OTHER SPECIFIED COMPLICATION, WITHOUT LONG-TERM CURRENT USE OF INSULIN (HCC): ICD-10-CM

## 2021-01-13 DIAGNOSIS — K76.89 LIVER CYST: ICD-10-CM

## 2021-01-13 DIAGNOSIS — G89.29 CHRONIC RIGHT SHOULDER PAIN: Primary | ICD-10-CM

## 2021-01-13 DIAGNOSIS — N18.31 CHRONIC RENAL IMPAIRMENT, STAGE 3A (HCC): ICD-10-CM

## 2021-01-13 DIAGNOSIS — M25.511 CHRONIC RIGHT SHOULDER PAIN: Primary | ICD-10-CM

## 2021-01-13 DIAGNOSIS — I73.9 PERIPHERAL VASCULAR DISEASE OF LOWER EXTREMITY (HCC): ICD-10-CM

## 2021-01-13 DIAGNOSIS — Z71.85 IMMUNIZATION COUNSELING: ICD-10-CM

## 2021-01-13 DIAGNOSIS — I10 ESSENTIAL HYPERTENSION: ICD-10-CM

## 2021-01-13 DIAGNOSIS — R31.21 ASYMPTOMATIC MICROSCOPIC HEMATURIA: ICD-10-CM

## 2021-01-13 PROCEDURE — 99214 OFFICE O/P EST MOD 30 MIN: CPT | Performed by: FAMILY MEDICINE

## 2021-01-13 RX ORDER — DILTIAZEM HYDROCHLORIDE EXTENDED-RELEASE TABLETS 240 MG/1
240 TABLET, EXTENDED RELEASE ORAL DAILY
Qty: 90 TABLET | Refills: 1 | Status: SHIPPED | OUTPATIENT
Start: 2021-01-13 | End: 2021-10-11 | Stop reason: SDUPTHER

## 2021-01-13 RX ORDER — ASPIRIN 81 MG/1
TABLET ORAL
COMMUNITY

## 2021-01-13 NOTE — PROGRESS NOTES
Assessment/Plan:       No problem-specific Assessment & Plan notes found for this encounter  Diagnoses and all orders for this visit:    Chronic right shoulder pain  Comments:  Await x-ray report  To consider physical therapy    Essential hypertension  Comments:  Controlled   Continue medication  To follow with the dash diet  Orders:  -     diltiazem (CARDIZEM LA) 240 MG 24 hr tablet; Take 1 tablet (240 mg total) by mouth daily    Pure hypercholesterolemia  Comments:  Good control  To follow with low-fat    Type 2 diabetes mellitus with other specified complication, without long-term current use of insulin (HCC)  Comments:  Good control, follow-up 1800 calorie a day    Chronic renal impairment, stage 3a  Comments:  Stable  Advise to avoid NSAID, hydrate self well  Orders:  -     Ambulatory referral to dialysis; Future  -     Phosphorus; Future  -     Magnesium; Future  -     PTH, intact; Future  -     Vitamin D 25 hydroxy; Future    Low vitamin D level  -     Vitamin D 25 hydroxy; Future    Liver cyst  -     US liver; Future    Leukopenia, unspecified type  Comments:  Recent blood count is normal    Asymptomatic microscopic hematuria  Comments: Following with urology    Peripheral vascular disease of lower extremity ([facility] [address] )  Comments:  Patient is asymptomatic  Orders:  -     VAS lower limb arterial duplex, complete bilateral; Future    Immunization counseling  Comments:  Recommended COVID vaccine    Other orders  -     aspirin (Aspirin 81) 81 mg EC tablet        Patient Instructions   To follow up with test results      Orders Placed This Encounter   Procedures    US liver     Standing Status:   Future     Standing Expiration Date:   1/13/2025     Scheduling Instructions:      Patients OVER 15 yrs of age  If your appointment is scheduled BEFORE 12:00pm, you may not eat or drink anything after midnight on the day of your test   Prior to NPO please refrain from high fiber gassy foods and carbonatedbeverages  If your appointment is scheduled for AFTER 12:00pm, you may not eat or drink anything 6-8 hours prior to your test   You may take meds with a small amount of water  No Food or drink after midnight for AMappointments  PM patients must not eat or drink for 6-8 hours prior to their appointment  Refrain from drinking carbonated beverages  Diabetics who need PM  appointments may have a light breakfast but cannot haveany additional food or drinks after breakfast  Schedule the PM appointments for 6-8 hours after that mornings meal             IF the patient is having a Pelvic Ultrasound or Renal Ultrasound the bladder filling prep must begiven to the patient  These studies require drinking 24 ounces of water one hour prior to the appointment and they are NOT to empty their bladder before having their ultrasound examination  Please bring your insurance cards, a form of photo ID and a list of your medications with you  Arrive 15 minutes prior to your appointment time in order to register  To schedule this appointment, please contact Central Scheduling at [phone]   Phosphorus     Standing Status:   Future     Standing Expiration Date:   1/13/2022    Magnesium     Standing Status:   Future     Standing Expiration Date:   1/13/2022    PTH, intact     Standing Status:   Future     Standing Expiration Date:   1/13/2022    Vitamin D 25 hydroxy     Standing Status:   Future     Standing Expiration Date:   1/13/2022    Ambulatory referral to dialysis     Standing Status:   Future     Standing Expiration Date:   1/13/2022     Referral Priority:   Routine     Referral Type:   Program     Referral Reason:   Specialty Services Required     Number of Visits Requested:   1     Expiration Date:   1/13/2022         Subjective:     Patient ID: Daniel Bhatt is a 79 y o  female      HPI   New complaint  Shoulder pain     Right side    Started about few months ago only she feels it when she shovel snow  Or does some heavy work around the house  Denied injury  Denied swelling or redness of the joint   I declined decreased range of motion    Chronic medical problems  Diabetes  Patient stated she was not watching her diet carefully around the holiday  Denied polyuria or polydipsia  Does not check blood sugar at home  Denied sign of symptom of hypoglycemia she did see her eye doctor  Hyperlipidemia admit to regular fat intake denied side effect with the T and Crestor  Denied chest pain  Microscopic hematuria  Denied hematuria following with Urology  Chronic renal failure  Denied edema  Hypertension admit to regular salt intake denied headache or dizziness  Microscopic hematuria  Patient had been following with Urology  She is going to have renal and bladder ultrasound  Patient stated she had left foot pain ,she saw Podiatry was diagnosed with plantar fasciitis  Symptoms improved  Test results  Lab done on January 6, 2021  Discussed result with patient  Arterial Doppler of the lower extremities  Not done    Podiatry office visit on November 3, 2020 noted    Review of Systems   Constitutional: Negative for activity change, appetite change, chills, fatigue, fever and unexpected weight change  HENT: Negative for congestion, ear discharge, ear pain, hearing loss, nosebleeds, rhinorrhea, sinus pressure, sore throat, tinnitus, trouble swallowing and voice change  Eyes: Negative for photophobia, pain and visual disturbance  Respiratory: Negative for cough, chest tightness, shortness of breath and wheezing  Cardiovascular: Negative for chest pain, palpitations and leg swelling  Gastrointestinal: Negative for abdominal pain, anal bleeding, blood in stool, constipation, diarrhea, nausea and vomiting  Endocrine: Negative for cold intolerance, heat intolerance, polydipsia and polyuria  Genitourinary: Negative for dysuria, frequency, hematuria and urgency  Musculoskeletal: Negative for arthralgias, back pain, gait problem, joint swelling, myalgias and neck pain  Skin: Negative for rash  Neurological: Negative for dizziness, tremors, seizures, syncope, weakness, light-headedness and headaches  Hematological: Negative for adenopathy  Does not bruise/bleed easily  Psychiatric/Behavioral: Negative for agitation, behavioral problems, confusion, dysphoric mood, hallucinations and sleep disturbance  The patient is not nervous/anxious  Objective:     Physical Exam  Constitutional:       General: She is not in acute distress  Appearance: She is well-developed  HENT:      Head: Normocephalic and atraumatic  Eyes:      General: No scleral icterus  Conjunctiva/sclera: Conjunctivae normal       Pupils: Pupils are equal, round, and reactive to light  Neck:      Thyroid: No thyromegaly  Vascular: No JVD  Cardiovascular:      Rate and Rhythm: Normal rate and regular rhythm  Heart sounds: Normal heart sounds  No murmur  Comments: Extremities  No edema  Pulmonary:      Effort: Pulmonary effort is normal       Breath sounds: Normal breath sounds  Abdominal:      Palpations: Abdomen is soft  There is no mass  Tenderness: There is no abdominal tenderness  There is no guarding or rebound  Hernia: No hernia is present  Musculoskeletal:      Comments: Right shoulder  No swelling or tenderness has good range of motion  Normal strength and sensation of the right arm   Lymphadenopathy:      Cervical: No cervical adenopathy  Skin:     Findings: No rash  Neurological:      Mental Status: She is alert and oriented to person, place, and time  Cranial Nerves: No cranial nerve deficit  Motor: No abnormal muscle tone  Coordination: Coordination normal    Psychiatric:         Behavior: Behavior normal          Thought Content:  Thought content normal          Judgment: Judgment normal

## 2021-01-20 DIAGNOSIS — Z23 ENCOUNTER FOR IMMUNIZATION: ICD-10-CM

## 2021-01-21 ENCOUNTER — IMMUNIZATIONS (OUTPATIENT)
Dept: FAMILY MEDICINE CLINIC | Facility: HOSPITAL | Age: 81
End: 2021-01-21

## 2021-01-21 DIAGNOSIS — Z23 ENCOUNTER FOR IMMUNIZATION: Primary | ICD-10-CM

## 2021-01-21 PROCEDURE — 91301 SARS-COV-2 / COVID-19 MRNA VACCINE (MODERNA) 100 MCG: CPT

## 2021-01-21 PROCEDURE — 0011A SARS-COV-2 / COVID-19 MRNA VACCINE (MODERNA) 100 MCG: CPT

## 2021-01-26 ENCOUNTER — HOSPITAL ENCOUNTER (OUTPATIENT)
Dept: RADIOLOGY | Facility: IMAGING CENTER | Age: 81
Discharge: HOME | End: 2021-01-26
Payer: MEDICARE

## 2021-01-26 DIAGNOSIS — K76.89 LIVER CYST: ICD-10-CM

## 2021-01-26 PROCEDURE — 76705 ECHO EXAM OF ABDOMEN: CPT

## 2021-02-01 ENCOUNTER — TELEPHONE (OUTPATIENT)
Dept: FAMILY MEDICINE CLINIC | Facility: CLINIC | Age: 81
End: 2021-02-01

## 2021-02-01 DIAGNOSIS — R79.89 LOW SERUM PARATHYROID HORMONE (PTH): ICD-10-CM

## 2021-02-01 DIAGNOSIS — R79.89 HIGH SERUM MAGNESIUM: Primary | ICD-10-CM

## 2021-02-04 ENCOUNTER — TELEPHONE (OUTPATIENT)
Dept: FAMILY MEDICINE CLINIC | Facility: CLINIC | Age: 81
End: 2021-02-04

## 2021-02-04 DIAGNOSIS — G89.29 CHRONIC RIGHT SHOULDER PAIN: Primary | ICD-10-CM

## 2021-02-04 DIAGNOSIS — E83.41 HIGH MAGNESIUM LEVELS: Primary | ICD-10-CM

## 2021-02-04 DIAGNOSIS — R79.89 LOW SERUM PARATHYROID HORMONE (PTH): ICD-10-CM

## 2021-02-04 DIAGNOSIS — M25.511 CHRONIC RIGHT SHOULDER PAIN: Primary | ICD-10-CM

## 2021-02-04 NOTE — TELEPHONE ENCOUNTER
Patient states that last time she was seen she complained of right shoulder pain, she still has the ongoing pain and she is asking if she could get an order to get an xray done  Please advise I can put the order in the system for the patient since she gets her testing done at [facility] and I have to mail her the order   thanks

## 2021-02-15 ENCOUNTER — APPOINTMENT (OUTPATIENT)
Dept: RADIOLOGY | Age: 81
End: 2021-02-15
Payer: MEDICARE

## 2021-02-15 DIAGNOSIS — G89.29 CHRONIC RIGHT SHOULDER PAIN: ICD-10-CM

## 2021-02-15 DIAGNOSIS — M25.511 CHRONIC RIGHT SHOULDER PAIN: ICD-10-CM

## 2021-02-15 PROCEDURE — 73030 X-RAY EXAM OF SHOULDER: CPT

## 2021-02-18 ENCOUNTER — IMMUNIZATIONS (OUTPATIENT)
Dept: FAMILY MEDICINE CLINIC | Facility: HOSPITAL | Age: 81
End: 2021-02-18

## 2021-02-18 DIAGNOSIS — Z23 ENCOUNTER FOR IMMUNIZATION: Primary | ICD-10-CM

## 2021-02-18 PROCEDURE — 91301 SARS-COV-2 / COVID-19 MRNA VACCINE (MODERNA) 100 MCG: CPT

## 2021-02-18 PROCEDURE — 0012A SARS-COV-2 / COVID-19 MRNA VACCINE (MODERNA) 100 MCG: CPT

## 2021-02-23 ENCOUNTER — TELEPHONE (OUTPATIENT)
Dept: FAMILY MEDICINE CLINIC | Facility: CLINIC | Age: 81
End: 2021-02-23

## 2021-02-23 DIAGNOSIS — M19.011 OSTEOARTHRITIS OF RIGHT SHOULDER, UNSPECIFIED OSTEOARTHRITIS TYPE: Primary | ICD-10-CM

## 2021-02-23 NOTE — TELEPHONE ENCOUNTER
----- Message from Kamari Banks MD sent at 2/23/2021 11:57 AM EST -----  X-ray of the shoulder  Remarkable for degenerative changes also numerous intra-articular loose bodies suggested    Refer patient to orthopedic

## 2021-03-11 ENCOUNTER — CONSULT (OUTPATIENT)
Dept: OBGYN CLINIC | Facility: MEDICAL CENTER | Age: 81
End: 2021-03-11
Payer: MEDICARE

## 2021-03-11 VITALS
SYSTOLIC BLOOD PRESSURE: 148 MMHG | DIASTOLIC BLOOD PRESSURE: 80 MMHG | TEMPERATURE: 98.9 F | WEIGHT: 182 LBS | HEIGHT: 66 IN | BODY MASS INDEX: 29.25 KG/M2

## 2021-03-11 DIAGNOSIS — M19.011 PRIMARY OSTEOARTHRITIS OF RIGHT SHOULDER: Primary | ICD-10-CM

## 2021-03-11 DIAGNOSIS — M19.011 OSTEOARTHRITIS OF RIGHT SHOULDER, UNSPECIFIED OSTEOARTHRITIS TYPE: ICD-10-CM

## 2021-03-11 PROCEDURE — 99203 OFFICE O/P NEW LOW 30 MIN: CPT | Performed by: ORTHOPAEDIC SURGERY

## 2021-03-11 PROCEDURE — 20610 DRAIN/INJ JOINT/BURSA W/O US: CPT | Performed by: ORTHOPAEDIC SURGERY

## 2021-03-11 RX ORDER — METHYLPREDNISOLONE ACETATE 40 MG/ML
1 INJECTION, SUSPENSION INTRA-ARTICULAR; INTRALESIONAL; INTRAMUSCULAR; SOFT TISSUE
Status: COMPLETED | OUTPATIENT
Start: 2021-03-11 | End: 2021-03-11

## 2021-03-11 RX ORDER — LIDOCAINE HYDROCHLORIDE 10 MG/ML
3 INJECTION, SOLUTION INFILTRATION; PERINEURAL
Status: COMPLETED | OUTPATIENT
Start: 2021-03-11 | End: 2021-03-11

## 2021-03-11 RX ADMIN — LIDOCAINE HYDROCHLORIDE 3 ML: 10 INJECTION, SOLUTION INFILTRATION; PERINEURAL at 08:28

## 2021-03-11 RX ADMIN — METHYLPREDNISOLONE ACETATE 1 ML: 40 INJECTION, SUSPENSION INTRA-ARTICULAR; INTRALESIONAL; INTRAMUSCULAR; SOFT TISSUE at 08:28

## 2021-03-11 NOTE — PROGRESS NOTES
Ortho Sports Medicine Shoulder New Patient Visit     Assesment:   79 y o  female right shoulder moderate glenohumeral joint OA    Plan:    Conservative treatment:    Ice to shoulder 1-2 times daily, for 20 minutes at a time  PT for ROM and strengthening to shoulder, rotator cuff, scapular stabilizers  Imaging: All imaging from today was reviewed by myself and explained to the patient  Injection:    The risks and benefits of the injection (which include but are not limited to: infection, bleeding,damage to nerve/artery, need for further intervention), as well as the risks and benefits of all alternative treatments were explained and understood  The patient elected to proceed with injection  The procedure was done with aseptic technique, and the patient tolerated the procedure well with no complications  A corticosteroid injection of the subacromial space was performed  Ice to the shoulder 1-2 times daily for 20 minutes, for next 24-48 hrs  Surgery:     No surgery is recommended at this point, continue with conservative treatment plan as noted  Follow up:    No follow-ups on file  Chief Complaint   Patient presents with    Right Shoulder - Pain       History of Present Illness: The patient is a 79 y o , right hand dominant female whose occupation is retired, referred to me by their primary care physician, seen in clinic for consultation of right shoulder pain  The patient has a history of diabetes  The patient denies a history of thyroid disorder  Pain is located anterior, posterior, lateral   The patient rates the pain as a 6/10  The pain has been present for a few months  The patient denied sustaining any injury  The mechanism of injury was unknown  The pain is characterized as dull, achy  The pain is present at all times  But is worse in the morning  Patient also states that she feels the arm is weak in the morning as well      Pain is improved by rest, ice and biofreeze  Pain is aggravated by overhead activity, reaching back and rotation  Symptoms include clicking, catching and popping  The patient denies weakness  The patient denies numbness and tingling  The patient has tried rest, ice and biofreeze  [facility] Shoulder Surgical History:  None    Past Medical, Social and Family History:  Past Medical History:   Diagnosis Date    Hypertension      Past Surgical History:   Procedure Laterality Date    TUBAL LIGATION Bilateral      Allergies   Allergen Reactions    Lansoprazole Diarrhea    Naproxen     Nsaids      Other reaction(s): renal dysfunction  Category: Adverse Reaction;     Sulfa Antibiotics Hives    Trimethoprim     Amoxicillin Fever and Rash     Category: Allergy;      Current Outpatient Medications on File Prior to Visit   Medication Sig Dispense Refill    aspirin (Aspirin 81) 81 mg EC tablet       bimatoprost (LUMIGAN) 0 01 % ophthalmic drops Apply 1 drop to eye Daily      brimonidine (ALPHAGAN P) 0 1 % Apply 1 drop to eye every 12 (twelve) hours      Calcium Carbonate-Vitamin D (CALTRATE 600+D PO) Take 2 tablets by mouth daily       cholecalciferol (VITAMIN D3) 1,000 units tablet Take 1,000 Units by mouth daily      diltiazem (CARDIZEM LA) 240 MG 24 hr tablet Take 1 tablet (240 mg total) by mouth daily 90 tablet 1    ezetimibe (ZETIA) 10 mg tablet Take 1 tablet (10 mg total) by mouth daily 90 tablet 3    Multiple Vitamins-Minerals (CENTRUM SILVER ULTRA WOMENS PO) Take 1 tablet by mouth daily      olmesartan (Benicar) 20 mg tablet Take 1 tablet (20 mg total) by mouth daily 90 tablet 3    rosuvastatin (CRESTOR) 10 MG tablet Take 1 tablet (10 mg total) by mouth daily 90 tablet 3     No current facility-administered medications on file prior to visit        Social History     Socioeconomic History    Marital status:      Spouse name: Not on file    Number of children: Not on file    Years of education: Not on file    Highest education level: Not on file   Occupational History    Not on file   Social Needs    Financial resource strain: Not on file    Food insecurity     Worry: Not on file     Inability: Not on file    Transportation needs     Medical: Not on file     Non-medical: Not on file   Tobacco Use    Smoking status: Former Smoker     Types: Cigarettes    Smokeless tobacco: Former User   Substance and Sexual Activity    Alcohol use: Yes     Comment: ocassionally    Drug use: No    Sexual activity: Not Currently   Lifestyle    Physical activity     Days per week: Not on file     Minutes per session: Not on file    Stress: Not on file   Relationships    Social connections     Talks on phone: Not on file     Gets together: Not on file     Attends Mandaen service: Not on file     Active member of club or organization: Not on file     Attends meetings of clubs or organizations: Not on file     Relationship status: Not on file    Intimate partner violence     Fear of current or ex partner: Not on file     Emotionally abused: Not on file     Physically abused: Not on file     Forced sexual activity: Not on file   Other Topics Concern    Not on file   Social History Narrative    Not on file         I have reviewed the past medical, surgical, social and family history, medications and allergies as documented in the EMR  Review of systems: ROS is negative other than that noted in the HPI  Constitutional: Negative for fatigue and fever  HENT: Negative for sore throat  Respiratory: Negative for shortness of breath  Cardiovascular: Negative for chest pain  Gastrointestinal: Negative for abdominal pain  Endocrine: Negative for cold intolerance and heat intolerance  Genitourinary: Negative for flank pain  Musculoskeletal: Negative for back pain  Skin: Negative for rash  Allergic/Immunologic: Negative for immunocompromised state  Neurological: Negative for dizziness     Psychiatric/Behavioral: Negative for agitation  Physical Exam:    Temperature 98 9 °F (37 2 °C), height 5' 6" (1 676 m), weight 82 6 kg (182 lb)  General/Constitutional: NAD, well developed, well nourished  HENT: Normocephalic, atraumatic  CV: Intact distal pulses, regular rate  Resp: No respiratory distress or labored breathing  Lymphatic: No lymphadenopathy palpated  Neuro: Alert and Oriented x 3, no focal deficits  Psych: Normal mood, normal affect, normal judgement, normal behavior  Skin: Warm, dry, no rashes, no erythema    Shoulder focused exam:    RIGHT LEFT    Scapula Atrophy Negative Negative     Winging Negative Negative     Protraction Negative Negative    Rotator cuff SS 5/5 5/5     IS 5/5 5/5     SubS 5/5 5/5    ROM     170     ER0 60 60     ER90 90    90     IR90 T6    T6     IRb T6    T6    TTP: AC Negative Negative     Biceps Negative Negative     Coracoid Negative Negative    Special Tests: O'Briens Negative Negative     Cordero-shear Negative Negative     Cross body Adduction Negative Negative     Speeds  Negative Negative     Micha's Negative Negative     Whipple Negative Negative       Neer Negative Negative     Massey Negative Negative    Instability: Apprehension & relocation not tested not tested     Load & shift not tested not tested    Other: Crank Negative Negative             Large joint arthrocentesis: R subacromial bursa  Universal Protocol:  Consent given by: patient and parent  Time out: Immediately prior to procedure a "time out" was called to verify the correct patient, procedure, equipment, support staff and site/side marked as required    Site marked: the operative site was marked  Supporting Documentation  Indications: pain and joint swelling   Procedure Details  Location: shoulder - R subacromial bursa  Needle size: 22 G  Ultrasound guidance: no  Approach: posterolateral  Medications administered: 3 mL lidocaine 1 %; 1 mL methylPREDNISolone acetate 40 mg/mL    Patient tolerance: patient tolerated the procedure well with no immediate complications  Dressing:  Sterile dressing applied            UE NV Exam: +2 Radial pulses bilaterally  Sensation intact to light touch C5-T1 bilaterally, Radial/median/ulnar nerve motor intact      Bilateral elbow, wrist, and and forearm ROM full, painless with passive ROM, no ttp or crepitance throughout extremities below shoulder joint    Cervical ROM is full without pain, numbness or tingling      Shoulder Imaging    X-rays of the right shoulder were reviewed, which demonstrate moderate glenohumeral joint OA  There are numerous calcified densities adjacent to the proximal humerus likely intra-articular loose bodies  I have reviewed the radiology report and agree with their impression        Scribe Attestation    I,:  Good Cotton am acting as a scribe while in the presence of the attending physician :       I,:  Ann Puentes DO personally performed the services described in this documentation    as scribed in my presence :

## 2021-03-15 ENCOUNTER — HOSPITAL ENCOUNTER (OUTPATIENT)
Dept: NON INVASIVE DIAGNOSTICS | Facility: HOSPITAL | Age: 81
Discharge: HOME | End: 2021-03-15
Attending: FAMILY MEDICINE
Payer: MEDICARE

## 2021-03-15 DIAGNOSIS — I73.9 PERIPHERAL VASCULAR DISEASE OF LOWER EXTREMITY (HCC): ICD-10-CM

## 2021-03-15 PROCEDURE — 93922 UPR/L XTREMITY ART 2 LEVELS: CPT | Performed by: SURGERY

## 2021-03-15 PROCEDURE — 93925 LOWER EXTREMITY STUDY: CPT | Performed by: SURGERY

## 2021-03-15 PROCEDURE — 93923 UPR/LXTR ART STDY 3+ LVLS: CPT

## 2021-03-15 PROCEDURE — 93925 LOWER EXTREMITY STUDY: CPT

## 2021-03-16 ENCOUNTER — TELEPHONE (OUTPATIENT)
Dept: FAMILY MEDICINE CLINIC | Facility: CLINIC | Age: 81
End: 2021-03-16

## 2021-03-16 NOTE — TELEPHONE ENCOUNTER
----- Message from Damaso Jama MD sent at 3/15/2021  2:31 PM EDT -----  Arterial Doppler of the lower extremity shows diffuse disease both legs without significant focal stenosis  Recheck Doppler in 1 year  Patient to call if she developed claudication    Keep office visit for follow-up

## 2021-03-18 ENCOUNTER — EVALUATION (OUTPATIENT)
Dept: PHYSICAL THERAPY | Facility: REHABILITATION | Age: 81
End: 2021-03-18
Payer: MEDICARE

## 2021-03-18 DIAGNOSIS — M19.011 PRIMARY OSTEOARTHRITIS OF RIGHT SHOULDER: ICD-10-CM

## 2021-03-18 DIAGNOSIS — M19.011 OSTEOARTHRITIS OF RIGHT SHOULDER, UNSPECIFIED OSTEOARTHRITIS TYPE: Primary | ICD-10-CM

## 2021-03-18 PROCEDURE — 97110 THERAPEUTIC EXERCISES: CPT | Performed by: PHYSICAL THERAPIST

## 2021-03-18 PROCEDURE — 97161 PT EVAL LOW COMPLEX 20 MIN: CPT | Performed by: PHYSICAL THERAPIST

## 2021-03-18 NOTE — PROGRESS NOTES
PT Evaluation     Today's date: 3/18/2021  Patient name: Hanna Xiong  : 1940  MRN: [id number]  Referring provider: Lis Alan DO  Dx:   Encounter Diagnosis     ICD-10-CM    1  Osteoarthritis of right shoulder, unspecified osteoarthritis type  M19 011 Ambulatory referral to Physical Therapy   2  Primary osteoarthritis of right shoulder  M19 011 Ambulatory referral to Physical Therapy                  Assessment  Assessment details: Pt is a pleasant 79 y o  female presenting to outpatient physical therapy with Osteoarthritis of right shoulder, unspecified osteoarthritis type  (primary encounter diagnosis)  Primary osteoarthritis of right shoulder  Pt presents with pain, decreased range of motion, decreased strength, and decreased tolerance to activity  Pt displays movement impairment diagnosis of right GHJ hypomobility dysfunction and RC weakness  Pt is a good candidate for outpatient physical therapy and would benefit from skilled physical therapy to address limitations and to achieve goals  Thank you for this referral    Impairments: abnormal coordination, abnormal or restricted ROM, activity intolerance, impaired physical strength and pain with function  Understanding of Dx/Px/POC: good   Prognosis: good    Goals  ST  Patient will report 25% decrease in pain in 4 weeks  2  Patient will be able to reach to shoulder height shelf without difficulty in 4 weeks  3  Patient will demonstrate 1/2 grade improvement in strength in 4 weeks  LT  Patient will be able to perform IADLS without restriction or pain by discharge  2  Patient will be independent in HEP by discharge  3  Patient will be able to return to recreational/work duties without restriction or pain by discharge        Plan  Patient would benefit from: PT eval and skilled PT  Planned modality interventions: cryotherapy and thermotherapy: hydrocollator packs  Planned therapy interventions: IADL retraining, body mechanics training, flexibility, functional ROM exercises, home exercise program, neuromuscular re-education, manual therapy, postural training, strengthening, stretching, therapeutic activities, therapeutic exercise and joint mobilization  Frequency: 2x week  Duration in visits: 8  Duration in weeks: 4  Treatment plan discussed with: patient        Subjective Evaluation    History of Present Illness  Mechanism of injury: 21  Pt reports onset of RUE pain starting approx 9 months ago, denying injury or trauma  Reports she consulted PCP and orthopedic physician  Reports she has had radiographs on R shoulder  Notes she had injection in orthopedic physician's office, which has helped her with reaching overhead      AGGS: lifting weighted items, reaching forward while holding an item  LOC: lateral upper R arm  EASES: avoiding agg motions   Pain  Current pain ratin  At worst pain ratin    Patient Goals  Patient goals for therapy: decreased pain, increased motion and independence with ADLs/IADLs          Objective     Active Range of Motion   Left Shoulder   External rotation BTH: T4   Internal rotation BTB: T4     Right Shoulder   Flexion: 125 degrees   Abduction: 102 degrees   External rotation BTH: T4   Internal rotation BTB: T9     Passive Range of Motion     Right Shoulder   Flexion: WFL  Abduction: WFL  External rotation 90°: 60 degrees   Internal rotation 90°: 50 degrees     Strength/Myotome Testing     Left Shoulder     Planes of Motion   Flexion: 4   Abduction: 4   External rotation at 0°: 4+   Internal rotation at 0°: 4+     Isolated Muscles   Biceps: 4   Triceps: 4+     Right Shoulder     Planes of Motion   Flexion: 4-   Abduction: 3+   External rotation at 0°: 3+   Internal rotation at 0°: 4     Isolated Muscles   Biceps: 4   Triceps: 4+              Precautions: HTN    Daily Treatment Diary    Date 3/18            FOTO IE            Re-Eval IE               Manuals    GHJ post & inf mobs RUBEN Gr IV MWM flex & scap                                       Neuro Re-Ed     scap retraction             Sidelying ER                                                                              Ther Ex    Wand flex              Wand scaption             TB IR/ER             TB flex to 90*             TB scap to 90*                                                    Ther Activity    Pulleys                           Gait Training                              Modalities

## 2021-03-23 ENCOUNTER — OFFICE VISIT (OUTPATIENT)
Dept: PHYSICAL THERAPY | Facility: REHABILITATION | Age: 81
End: 2021-03-23
Payer: MEDICARE

## 2021-03-23 DIAGNOSIS — M19.011 OSTEOARTHRITIS OF RIGHT SHOULDER, UNSPECIFIED OSTEOARTHRITIS TYPE: ICD-10-CM

## 2021-03-23 DIAGNOSIS — M19.011 PRIMARY OSTEOARTHRITIS OF RIGHT SHOULDER: Primary | ICD-10-CM

## 2021-03-23 PROCEDURE — 97112 NEUROMUSCULAR REEDUCATION: CPT | Performed by: PHYSICAL THERAPIST

## 2021-03-23 PROCEDURE — 97140 MANUAL THERAPY 1/> REGIONS: CPT | Performed by: PHYSICAL THERAPIST

## 2021-03-23 PROCEDURE — 97110 THERAPEUTIC EXERCISES: CPT | Performed by: PHYSICAL THERAPIST

## 2021-03-23 NOTE — PROGRESS NOTES
Daily Note     Today's date: 3/23/2021  Patient name: Kathie Klein  : 1940  MRN: [id number]  Referring provider: Rosemary Moreno DO  Dx:   Encounter Diagnosis     ICD-10-CM    1  Primary osteoarthritis of right shoulder  M19 011    2  Osteoarthritis of right shoulder, unspecified osteoarthritis type  M19 011                   Subjective: Pt comes to therapy reporting soreness in shoulder occasionally, however, less radiating symptoms down to elbow since initial visit  Notes she has been compliant with home exercises  Objective: See treatment diary below      Assessment: Tolerated treatment well  Cues needed for proper form throughout session  Most challenged with ER and flex & abd actively  Patient demonstrated fatigue post treatment and would benefit from continued PT      Plan: Progress treatment as tolerated         Precautions: HTN    Daily Treatment Diary    Date 3/18 3/23           FOTO IE            Re-Eval IE               Manuals    GHJ post & inf mobs RUBEN Gr IV RUBEN Gr IV           MWM flex & scap                                       Neuro Re-Ed     scap retraction  3"x20           Sidelying ER  nv                                                                            Ther Ex    Wand flex   3"x20           Wand scaption  3"x20           TB IR/ER  RTB  ER x8  IR x20           TB flex to 90*  2# x10  aarom           TB scap to 90*  2# x10 aarom                                                  Ther Activity    Pulleys   5'                        Gait Training                              Modalities

## 2021-03-26 ENCOUNTER — OFFICE VISIT (OUTPATIENT)
Dept: PHYSICAL THERAPY | Facility: REHABILITATION | Age: 81
End: 2021-03-26
Payer: MEDICARE

## 2021-03-26 DIAGNOSIS — M19.011 PRIMARY OSTEOARTHRITIS OF RIGHT SHOULDER: Primary | ICD-10-CM

## 2021-03-26 DIAGNOSIS — M19.011 OSTEOARTHRITIS OF RIGHT SHOULDER, UNSPECIFIED OSTEOARTHRITIS TYPE: ICD-10-CM

## 2021-03-26 PROCEDURE — 97112 NEUROMUSCULAR REEDUCATION: CPT | Performed by: PHYSICAL THERAPIST

## 2021-03-26 PROCEDURE — 97140 MANUAL THERAPY 1/> REGIONS: CPT | Performed by: PHYSICAL THERAPIST

## 2021-03-26 PROCEDURE — 97110 THERAPEUTIC EXERCISES: CPT | Performed by: PHYSICAL THERAPIST

## 2021-03-26 NOTE — PROGRESS NOTES
Daily Note     Today's date: 3/26/2021  Patient name: Quin Frances  : 1940  MRN: [id number]  Referring provider: Arturo Benites DO  Dx:   Encounter Diagnosis     ICD-10-CM    1  Primary osteoarthritis of right shoulder  M19 011    2  Osteoarthritis of right shoulder, unspecified osteoarthritis type  M19 011                   Subjective: Pt comes to therapy reporting some improvements in shoulder pain on some days  However, continues to report discomfort on other days  Objective: See treatment diary below      Assessment: Tolerated treatment well  Patient demonstrated fatigue post treatment and would benefit from continued PT  Requires verbal and tactile cuing for prop form and assistance with AROM/AAROM and strengthening  Improved passive flex post-mobs  Plan: Progress treatment as tolerated         Precautions: HTN    Daily Treatment Diary    Date 3/18 3/23 3/26          FOTO IE            Re-Eval IE               Manuals    GHJ post & inf mobs RUBEN Gr IV RUBEN Gr IV RUBEN Gr IV          ACJ mobs   RUBEN Gr IV                                    Neuro Re-Ed     scap retraction  3"x20 RTB x20          Sidelying ER  nv 2x10 aarom                                                                           Ther Ex    Wand flex   3"x20 3"x20          Wand scaption  3"x20 3"x20          TB IR/ER  RTB  ER x8  IR x20 RTB  ER x10  IR x20          TB flex to 90*  2# x10  aarom 2# x10 aarom          TB scap to 90*  2# x10 aarom 2# 2x5 aarom                                                 Ther Activity    Pulleys   5' 5'                       Gait Training                              Modalities

## 2021-03-30 ENCOUNTER — OFFICE VISIT (OUTPATIENT)
Dept: PHYSICAL THERAPY | Facility: REHABILITATION | Age: 81
End: 2021-03-30
Payer: MEDICARE

## 2021-03-30 DIAGNOSIS — M19.011 PRIMARY OSTEOARTHRITIS OF RIGHT SHOULDER: Primary | ICD-10-CM

## 2021-03-30 DIAGNOSIS — M19.011 OSTEOARTHRITIS OF RIGHT SHOULDER, UNSPECIFIED OSTEOARTHRITIS TYPE: ICD-10-CM

## 2021-03-30 PROCEDURE — 97110 THERAPEUTIC EXERCISES: CPT

## 2021-03-30 PROCEDURE — 97112 NEUROMUSCULAR REEDUCATION: CPT

## 2021-03-30 PROCEDURE — 97140 MANUAL THERAPY 1/> REGIONS: CPT

## 2021-03-30 NOTE — PROGRESS NOTES
Daily Note     Today's date: 3/30/2021  Patient name: Krista Paget  : 1940  MRN: [id number]  Referring provider: Brad Curran DO  Dx:   Encounter Diagnosis     ICD-10-CM    1  Primary osteoarthritis of right shoulder  M19 011    2  Osteoarthritis of right shoulder, unspecified osteoarthritis type  M19 011                   Subjective: Pt reports that she felt good following last session  Notes that she vacuumed this weekend and noticed some pain  Notes that at times it feels great but if she uses it in certain positions to drive it bothers her  Objective: See treatment diary below      Assessment: Tolerated treatment well  Patient demonstrated fatigue post treatment and would benefit from continued PT  Continues to do well with current POC focusing on shoulder mobility and strengthening  Pt was very weak in all movements besides IR with cuing needed to promote scapular retraction with most exercises today  Plan: Progress treatment as tolerated         Precautions: HTN    Daily Treatment Diary    Date 3/18 3/23 3/26 3/30         FOTO IE            Re-Eval IE               Manuals    GHJ post & inf mobs RUBEN Gr IV RUBEN Gr IV RUBEN Gr IV          ACJ mobs   RUBEN Gr IV          R Shoulder PROM    MM                      Neuro Re-Ed     scap retraction  3"x20 RTB x20 RTB x20         Sidelying ER  nv 2x10 aarom 2x10 a/aarom                                                                          Ther Ex    Wand flex   3"x20 3"x20 3"x20         Wand scaption  3"x20 3"x20 3"x20         TB IR/ER  RTB  ER x8  IR x20 RTB  ER x10  IR x20 RTB  ER x15  IR x20         TB flex to 90*  2# x10  aarom 2# x10 aarom 2# x15 a/aarom         TB scap to 90*  2# x10 aarom 2# 2x5 aarom 2# 10x a/aarom                                                Ther Activity    Pulleys   5' 5' 5'                      Gait Training                              Modalities

## 2021-04-01 ENCOUNTER — OFFICE VISIT (OUTPATIENT)
Dept: PHYSICAL THERAPY | Facility: REHABILITATION | Age: 81
End: 2021-04-01
Payer: MEDICARE

## 2021-04-01 DIAGNOSIS — M19.011 OSTEOARTHRITIS OF RIGHT SHOULDER, UNSPECIFIED OSTEOARTHRITIS TYPE: ICD-10-CM

## 2021-04-01 DIAGNOSIS — M19.011 PRIMARY OSTEOARTHRITIS OF RIGHT SHOULDER: Primary | ICD-10-CM

## 2021-04-01 PROCEDURE — 97110 THERAPEUTIC EXERCISES: CPT | Performed by: PHYSICAL THERAPIST

## 2021-04-01 PROCEDURE — 97112 NEUROMUSCULAR REEDUCATION: CPT | Performed by: PHYSICAL THERAPIST

## 2021-04-01 PROCEDURE — 97140 MANUAL THERAPY 1/> REGIONS: CPT | Performed by: PHYSICAL THERAPIST

## 2021-04-01 NOTE — PROGRESS NOTES
Daily Note     Today's date: 2021  Patient name: Loreto Griffith  : 1940  MRN: [id number]  Referring provider: Chikis Jansen DO  Dx:   Encounter Diagnosis     ICD-10-CM    1  Primary osteoarthritis of right shoulder  M19 011    2  Osteoarthritis of right shoulder, unspecified osteoarthritis type  M19 011                   Subjective: Pt comes to therapy stating she has been having less pain in shoulder with certain motions, such as reaching behind her back and overhead  Notes she has been compliant with HEP  Objective: See treatment diary below      Assessment: Tolerated treatment well  Patient demonstrated fatigue post treatment, exhibited good technique with therapeutic exercises and would benefit from continued PT      Plan: Progress treatment as tolerated         Precautions: HTN    Daily Treatment Diary    Date 3/18 3/23 3/26 3/30 4/1        FOTO IE    nv        Re-Eval IE               Manuals    GHJ post & inf mobs RUBEN Gr IV RUBEN Gr IV RUBEN Gr IV  RUBEN Gr IV        ACJ mobs   RUBEN Gr IV  RUBEN Gr IV        R Shoulder PROM    MM                      Neuro Re-Ed     scap retraction  3"x20 RTB x20 RTB x20 gtb 2x10        TB shoulder ext     gtb 2x10        Sidelying ER  nv 2x10 aarom 2x10 a/aarom nv                                                                         Ther Ex    Wand flex   3"x20 3"x20 3"x20 3"x20        Wand scaption  3"x20 3"x20 3"x20 3"x20        Wand extension     3"x20        TB IR/ER  RTB  ER x8  IR x20 RTB  ER x10  IR x20 RTB  ER x15  IR x20 RTB  ER x15  IR x20        TB flex to 90*  2# x10  aarom 2# x10 aarom 2# x15 a/aarom 2# x10/8  A/AA        TB scap to 90*  2# x10 aarom 2# 2x5 aarom 2# 10x a/aarom 2# x10/8  A/AA                                               Ther Activity    Pulleys   5' 5' 5' 5'                     Gait Training                              Modalities

## 2021-04-06 ENCOUNTER — OFFICE VISIT (OUTPATIENT)
Dept: PHYSICAL THERAPY | Facility: REHABILITATION | Age: 81
End: 2021-04-06
Payer: MEDICARE

## 2021-04-06 DIAGNOSIS — M19.011 PRIMARY OSTEOARTHRITIS OF RIGHT SHOULDER: Primary | ICD-10-CM

## 2021-04-06 DIAGNOSIS — M19.011 OSTEOARTHRITIS OF RIGHT SHOULDER, UNSPECIFIED OSTEOARTHRITIS TYPE: ICD-10-CM

## 2021-04-06 PROCEDURE — 97140 MANUAL THERAPY 1/> REGIONS: CPT | Performed by: PHYSICAL THERAPIST

## 2021-04-06 PROCEDURE — 97110 THERAPEUTIC EXERCISES: CPT | Performed by: PHYSICAL THERAPIST

## 2021-04-06 PROCEDURE — 97112 NEUROMUSCULAR REEDUCATION: CPT | Performed by: PHYSICAL THERAPIST

## 2021-04-06 NOTE — PROGRESS NOTES
Daily Note     Today's date: 2021  Patient name: Kaley Davidson  : 1940  MRN: [id number]  Referring provider: Tiffanie Sierra DO  Dx:   Encounter Diagnosis     ICD-10-CM    1  Primary osteoarthritis of right shoulder  M19 011    2  Osteoarthritis of right shoulder, unspecified osteoarthritis type  M19 011                   Subjective: Pt reports she has been having less pain and soreness in shoulder  However, still notices difficulty with raising arm and reaching to side, as she noticed when lifting a heavy glass and her purse both in forward flexed and abducted positions  Objective: See treatment diary below      Assessment: Tolerated treatment well  Patient demonstrated fatigue post treatment, exhibited good technique with therapeutic exercises and would benefit from continued PT      Plan: Progress treatment as tolerated         Precautions: HTN    Daily Treatment Diary    Date 3/18 3/23 3/26 3/30 4/1 4/6       FOTO IE    nv perf       Re-Eval IE               Manuals    GHJ post & inf mobs RUBEN Gr IV RUBEN Gr IV RUBEN Gr IV  RUBEN Gr IV RUBEN Gr IV       ACJ mobs   RUBEN Gr IV  RUBEN Gr IV RUBEN Gr IV       R Shoulder PROM    MM                      Neuro Re-Ed     scap retraction  3"x20 RTB x20 RTB x20 gtb 2x10 gtb 2x10       TB shoulder ext     gtb 2x10 gtb 2x10       Sidelying ER  nv 2x10 aarom 2x10 a/aarom nv nv                                                                        Ther Ex    Wand flex   3"x20 3"x20 3"x20 3"x20 D/C       Wand scaption  3"x20 3"x20 3"x20 3"x20 D/C       Wand extension     3"x20 D/C       TB IR/ER  RTB  ER x8  IR x20 RTB  ER x10  IR x20 RTB  ER x15  IR x20 RTB  ER x15  IR x20 RTB  ER x20  IR x20       TB flex to 90*  2# x10  aarom 2# x10 aarom 2# x15 a/aarom 2# x10/8  A/AA 2# 2x10  A/AA       TB scap to 90*  2# x10 aarom 2# 2x5 aarom 2# 10x a/aarom 2# x10/8  A/AA 2# 2x10  A/AA                                              Ther Activity    Pulleys   5' 5' 5' 5' 5' Gait Training                              Modalities

## 2021-04-08 ENCOUNTER — OFFICE VISIT (OUTPATIENT)
Dept: PHYSICAL THERAPY | Facility: REHABILITATION | Age: 81
End: 2021-04-08
Payer: MEDICARE

## 2021-04-08 DIAGNOSIS — M19.011 OSTEOARTHRITIS OF RIGHT SHOULDER, UNSPECIFIED OSTEOARTHRITIS TYPE: ICD-10-CM

## 2021-04-08 DIAGNOSIS — M19.011 PRIMARY OSTEOARTHRITIS OF RIGHT SHOULDER: Primary | ICD-10-CM

## 2021-04-08 PROCEDURE — 97110 THERAPEUTIC EXERCISES: CPT | Performed by: PHYSICAL THERAPIST

## 2021-04-08 PROCEDURE — 97112 NEUROMUSCULAR REEDUCATION: CPT | Performed by: PHYSICAL THERAPIST

## 2021-04-08 PROCEDURE — 97140 MANUAL THERAPY 1/> REGIONS: CPT | Performed by: PHYSICAL THERAPIST

## 2021-04-08 NOTE — PROGRESS NOTES
Daily Note     Today's date: 2021  Patient name: Eric Young  : 1940  MRN: [id number]  Referring provider: Chayito Patterson DO  Dx:   Encounter Diagnosis     ICD-10-CM    1  Primary osteoarthritis of right shoulder  M19 011    2  Osteoarthritis of right shoulder, unspecified osteoarthritis type  M19 011                   Subjective: Pt reports she notices some improvement in her mobility, however, continues to report difficulty with reaching into abducted or externally rotated position with weighted object  Objective: See treatment diary below      Assessment: Tolerated treatment well  Patient demonstrated fatigue post treatment, exhibited good technique with therapeutic exercises and would benefit from continued PT      Plan: Progress treatment as tolerated         Precautions: HTN    Daily Treatment Diary    Date 3/18 3/23 3/26 3/30 4/1 4/6 4/8      FOTO IE    nv perf       Re-Eval IE               Manuals    GHJ post & inf mobs RUBEN Gr IV RUBEN Gr IV RUBEN Gr IV  RUBEN Gr IV RUBEN Gr IV RUBEN Gr IV      ACJ mobs   RUBEN Gr IV  RUBEN Gr IV RUBEN Gr IV RUBEN Gr IV      R Shoulder PROM    MM                      Neuro Re-Ed     scap retraction  3"x20 RTB x20 RTB x20 gtb 2x10 gtb 2x10 gtb 2x10      TB shoulder ext     gtb 2x10 gtb 2x10 gtb 2x15      Sidelying ER  nv 2x10 aarom 2x10 a/aarom nv nv       TB no papa       rtb 1x10                                                          Ther Ex    Wand flex   3"x20 3"x20 3"x20 3"x20 D/C       Wand scaption  3"x20 3"x20 3"x20 3"x20 D/C       Wand extension     3"x20 D/C       TB IR/ER  RTB  ER x8  IR x20 RTB  ER x10  IR x20 RTB  ER x15  IR x20 RTB  ER x15  IR x20 RTB  ER x20  IR x20 RTB  ER x20  IR x20      TB flex to 90*  2# x10  aarom 2# x10 aarom 2# x15 a/aarom 2# x10/8  A/AA 2# 2x10  A/AA 2# x20 AROM      TB scap to 90*  2# x10 aarom 2# 2x5 aarom 2# 10x a/aarom 2# x10/8  A/AA 2# 2x10  A/AA 2# x20 AROM                                             Ther Activity    Pulleys   5' 5' 5' 5' 5' 5'                   Gait Training                              Modalities

## 2021-04-13 ENCOUNTER — OFFICE VISIT (OUTPATIENT)
Dept: PHYSICAL THERAPY | Facility: REHABILITATION | Age: 81
End: 2021-04-13
Payer: MEDICARE

## 2021-04-13 DIAGNOSIS — M19.011 PRIMARY OSTEOARTHRITIS OF RIGHT SHOULDER: Primary | ICD-10-CM

## 2021-04-13 DIAGNOSIS — M19.011 OSTEOARTHRITIS OF RIGHT SHOULDER, UNSPECIFIED OSTEOARTHRITIS TYPE: ICD-10-CM

## 2021-04-13 PROCEDURE — 97140 MANUAL THERAPY 1/> REGIONS: CPT | Performed by: PHYSICAL MEDICINE & REHABILITATION

## 2021-04-13 PROCEDURE — 97112 NEUROMUSCULAR REEDUCATION: CPT | Performed by: PHYSICAL MEDICINE & REHABILITATION

## 2021-04-13 PROCEDURE — 97110 THERAPEUTIC EXERCISES: CPT | Performed by: PHYSICAL MEDICINE & REHABILITATION

## 2021-04-13 NOTE — PROGRESS NOTES
Daily Note     Today's date: 2021  Patient name: Uyen Bunch  : 1940  MRN: [id number]  Referring provider: Cyrus Cevallos DO  Dx:   Encounter Diagnosis     ICD-10-CM    1  Primary osteoarthritis of right shoulder  M19 011    2  Osteoarthritis of right shoulder, unspecified osteoarthritis type  M19 011        Start Time: 1100  Stop Time: 1145  Total time in clinic (min): 45 minutes    Subjective: Pt states that she feels a little better each visit, she continues to be challenged with RTC exercises and with activities that require pushing with the R UE  Objective: See treatment diary below      Assessment: Tolerated treatment well  Patient demonstrated fatigue post treatment, exhibited good technique with therapeutic exercises and would benefit from continued PT  Tactile cueing utilized for proper isolation of external rotators during s/l activity with onset of fatigue  Pt performed theracord ER to her full ROM, therapist provided assistance into greater ER and the pt eccentrically controlled back to neutral x 8 reps  Plan: Progress treatment as tolerated         Precautions: HTN    Daily Treatment Diary    Date 3/18 3/23 3/26 3/30 4/1 4/6 4/8 4/13     FOTO IE    nv perf       Re-Eval IE               Manuals    GHJ post & inf mobs RUBEN Gr IV RUBEN Gr IV RUBEN Gr IV  RUBEN Gr IV RUBEN Gr IV RUBEN Gr IV NC Gr IV     ACJ mobs   RUBEN Gr IV  RUBEN Gr IV RUBEN Gr IV RUBEN Gr IV NC  Gr IV     R Shoulder PROM    MM                      Neuro Re-Ed     scap retraction  3"x20 RTB x20 RTB x20 gtb 2x10 gtb 2x10 gtb 2x10 Blue web slide  20     TB shoulder ext     gtb 2x10 gtb 2x10 gtb 2x15 [employer] slide  20     Sidelying ER  nv 2x10 aarom 2x10 a/aarom nv nv  2 x 10     TB no papa       rtb 1x10                                                          Ther Ex    Wand flex   3"x20 3"x20 3"x20 3"x20 D/C       Wand scaption  3"x20 3"x20 3"x20 3"x20 D/C       Wand extension     3"x20 D/C       TB IR/ER  RTB  ER x8  IR x20 RTB  ER x10  IR x20 RTB  ER x15  IR x20 RTB  ER x15  IR x20 RTB  ER x20  IR x20 RTB  ER x20  IR x20 Web slide  IR-green  2 x 12   ER-red  2 x 12     TB flex to 90*  2# x10  aarom 2# x10 aarom 2# x15 a/aarom 2# x10/8  A/AA 2# 2x10  A/AA 2# x20 AROM 2#   x 20 AROM     TB scap to 90*  2# x10 aarom 2# 2x5 aarom 2# 10x a/aarom 2# x10/8  A/AA 2# 2x10  A/AA 2# x20 AROM 2#  X 20  AROM                                            Ther Activity    Pulleys   5' 5' 5' 5' 5' 5' 5'                  Gait Training                              Modalities

## 2021-04-15 ENCOUNTER — OFFICE VISIT (OUTPATIENT)
Dept: FAMILY MEDICINE CLINIC | Facility: CLINIC | Age: 81
End: 2021-04-15
Payer: MEDICARE

## 2021-04-15 VITALS
OXYGEN SATURATION: 97 % | HEART RATE: 94 BPM | TEMPERATURE: 96.8 F | HEIGHT: 66 IN | DIASTOLIC BLOOD PRESSURE: 80 MMHG | SYSTOLIC BLOOD PRESSURE: 139 MMHG | WEIGHT: 178.8 LBS | BODY MASS INDEX: 28.73 KG/M2

## 2021-04-15 DIAGNOSIS — E11.69 TYPE 2 DIABETES MELLITUS WITH OTHER SPECIFIED COMPLICATION, WITHOUT LONG-TERM CURRENT USE OF INSULIN (HCC): ICD-10-CM

## 2021-04-15 DIAGNOSIS — M19.011 OSTEOARTHRITIS OF RIGHT SHOULDER, UNSPECIFIED OSTEOARTHRITIS TYPE: ICD-10-CM

## 2021-04-15 DIAGNOSIS — R79.89 LOW SERUM PARATHYROID HORMONE (PTH): ICD-10-CM

## 2021-04-15 DIAGNOSIS — I73.9 PERIPHERAL VASCULAR DISEASE (HCC): ICD-10-CM

## 2021-04-15 DIAGNOSIS — G89.29 CHRONIC RIGHT SHOULDER PAIN: ICD-10-CM

## 2021-04-15 DIAGNOSIS — Z00.00 MEDICARE ANNUAL WELLNESS VISIT, SUBSEQUENT: Primary | ICD-10-CM

## 2021-04-15 DIAGNOSIS — M25.511 CHRONIC RIGHT SHOULDER PAIN: ICD-10-CM

## 2021-04-15 DIAGNOSIS — R31.21 ASYMPTOMATIC MICROSCOPIC HEMATURIA: ICD-10-CM

## 2021-04-15 DIAGNOSIS — R79.89 LOW VITAMIN D LEVEL: ICD-10-CM

## 2021-04-15 DIAGNOSIS — I10 ESSENTIAL HYPERTENSION: ICD-10-CM

## 2021-04-15 DIAGNOSIS — E83.41 HIGH MAGNESIUM LEVELS: ICD-10-CM

## 2021-04-15 DIAGNOSIS — N18.31 CHRONIC RENAL IMPAIRMENT, STAGE 3A (HCC): ICD-10-CM

## 2021-04-15 DIAGNOSIS — K76.89 LIVER CYST: ICD-10-CM

## 2021-04-15 PROCEDURE — 1123F ACP DISCUSS/DSCN MKR DOCD: CPT | Performed by: FAMILY MEDICINE

## 2021-04-15 PROCEDURE — 99214 OFFICE O/P EST MOD 30 MIN: CPT | Performed by: FAMILY MEDICINE

## 2021-04-15 PROCEDURE — G0439 PPPS, SUBSEQ VISIT: HCPCS | Performed by: FAMILY MEDICINE

## 2021-04-15 NOTE — PATIENT INSTRUCTIONS
Medicare Preventive Visit Patient Instructions  Thank you for completing your Welcome to Medicare Visit or Medicare Annual Wellness Visit today  Your next wellness visit will be due in one year (4/16/2022)  The screening/preventive services that you may require over the next 5-10 years are detailed below  Some tests may not apply to you based off risk factors and/or age  Screening tests ordered at today's visit but not completed yet may show as past due  Also, please note that scanned in results may not display below  Preventive Screenings:  Service Recommendations Previous Testing/Comments   Colorectal Cancer Screening  * Colonoscopy    * Fecal Occult Blood Test (FOBT)/Fecal Immunochemical Test (FIT)  * Fecal DNA/Cologuard Test  * Flexible Sigmoidoscopy Age: 54-65 years old   Colonoscopy: every 10 years (may be performed more frequently if at higher risk)  OR  FOBT/FIT: every 1 year  OR  Cologuard: every 3 years  OR  Sigmoidoscopy: every 5 years  Screening may be recommended earlier than age 48 if at higher risk for colorectal cancer  Also, an individualized decision between you and your healthcare provider will decide whether screening between the ages of 74-80 would be appropriate  Colonoscopy: Not on file  FOBT/FIT: Not on file  Cologuard: Not on file  Sigmoidoscopy: Not on file          Breast Cancer Screening Age: 36 years old  Frequency: every 1-2 years  Not required if history of left and right mastectomy Mammogram: 07/29/2020        Cervical Cancer Screening Between the ages of 21-29, pap smear recommended once every 3 years  Between the ages of 33-67, can perform pap smear with HPV co-testing every 5 years     Recommendations may differ for women with a history of total hysterectomy, cervical cancer, or abnormal pap smears in past  Pap Smear: Not on file        Hepatitis C Screening Once for adults born between [facility]  More frequently in patients at high risk for Hepatitis C Hep C Antibody: 07/09/2020        Diabetes Screening 1-2 times per year if you're at risk for diabetes or have pre-diabetes Fasting glucose: No results in last 5 years   A1C: 6 2        Cholesterol Screening Once every 5 years if you don't have a lipid disorder  May order more often based on risk factors  Lipid panel: 01/16/2019          Other Preventive Screenings Covered by Medicare:  1  Abdominal Aortic Aneurysm (AAA) Screening: covered once if your at risk  You're considered to be at risk if you have a family history of AAA  2  Lung Cancer Screening: covers low dose CT scan once per year if you meet all of the following conditions: (1) Age 50-69; (2) No signs or symptoms of lung cancer; (3) Current smoker or have quit smoking within the last 15 years; (4) You have a tobacco smoking history of at least 30 pack years (packs per day multiplied by number of years you smoked); (5) You get a written order from a healthcare provider  3  Glaucoma Screening: covered annually if you're considered high risk: (1) You have diabetes OR (2) Family history of glaucoma OR (3)  aged 48 and older OR (3)  American aged 72 and older  3  Osteoporosis Screening: covered every 2 years if you meet one of the following conditions: (1) You're estrogen deficient and at risk for osteoporosis based off medical history and other findings; (2) Have a vertebral abnormality; (3) On glucocorticoid therapy for more than 3 months; (4) Have primary hyperparathyroidism; (5) On osteoporosis medications and need to assess response to drug therapy  · Last bone density test (DXA Scan): 07/29/2020   5  HIV Screening: covered annually if you're between the age of 15-65  Also covered annually if you are younger than 13 and older than 72 with risk factors for HIV infection  For pregnant patients, it is covered up to 3 times per pregnancy      Immunizations:  Immunization Recommendations   Influenza Vaccine Annual influenza vaccination during flu season is recommended for all persons aged >= 6 months who do not have contraindications   Pneumococcal Vaccine (Prevnar and Pneumovax)  * Prevnar = PCV13  * Pneumovax = PPSV23   Adults 25-60 years old: 1-3 doses may be recommended based on certain risk factors  Adults 72 years old: Prevnar (PCV13) vaccine recommended followed by Pneumovax (PPSV23) vaccine  If already received PPSV23 since turning 65, then PCV13 recommended at least one year after PPSV23 dose  Hepatitis B Vaccine 3 dose series if at intermediate or high risk (ex: diabetes, end stage renal disease, liver disease)   Tetanus (Td) Vaccine - COST NOT COVERED BY MEDICARE PART B Following completion of primary series, a booster dose should be given every 10 years to maintain immunity against tetanus  Td may also be given as tetanus wound prophylaxis  Tdap Vaccine - COST NOT COVERED BY MEDICARE PART B Recommended at least once for all adults  For pregnant patients, recommended with each pregnancy  Shingles Vaccine (Shingrix) - COST NOT COVERED BY MEDICARE PART B  2 shot series recommended in those aged 48 and above     Health Maintenance Due:      Topic Date Due    Hepatitis C Screening  Completed     Immunizations Due:  There are no preventive care reminders to display for this patient  Advance Directives   What are advance directives? Advance directives are legal documents that state your wishes and plans for medical care  These plans are made ahead of time in case you lose your ability to make decisions for yourself  Advance directives can apply to any medical decision, such as the treatments you want, and if you want to donate organs  What are the types of advance directives? There are many types of advance directives, and each state has rules about how to use them  You may choose a combination of any of the following:  · Living will: This is a written record of the treatment you want   You can also choose which treatments you do not want, which to limit, and which to stop at a certain time  This includes surgery, medicine, IV fluid, and tube feedings  · Durable power of  for healthcare [facility]): This is a written record that states who you want to make healthcare choices for you when you are unable to make them for yourself  This person, called a proxy, is usually a family member or a friend  You may choose more than 1 proxy  · Do not resuscitate (DNR) order:  A DNR order is used in case your heart stops beating or you stop breathing  It is a request not to have certain forms of treatment, such as CPR  A DNR order may be included in other types of advance directives  · Medical directive: This covers the care that you want if you are in a coma, near death, or unable to make decisions for yourself  You can list the treatments you want for each condition  Treatment may include pain medicine, surgery, blood transfusions, dialysis, IV or tube feedings, and a ventilator (breathing machine)  · Values history: This document has questions about your views, beliefs, and how you feel and think about life  This information can help others choose the care that you would choose  Why are advance directives important? An advance directive helps you control your care  Although spoken wishes may be used, it is better to have your wishes written down  Spoken wishes can be misunderstood, or not followed  Treatments may be given even if you do not want them  An advance directive may make it easier for your family to make difficult choices about your care  Weight Management   Why it is important to manage your weight:  Being overweight increases your risk of health conditions such as heart disease, high blood pressure, type 2 diabetes, and certain types of cancer  It can also increase your risk for osteoarthritis, sleep apnea, and other respiratory problems  Aim for a slow, steady weight loss   Even a small amount of weight loss can lower your risk of health problems  How to lose weight safely:  A safe and healthy way to lose weight is to eat fewer calories and get regular exercise  You can lose up about 1 pound a week by decreasing the number of calories you eat by 500 calories each day  Healthy meal plan for weight management:  A healthy meal plan includes a variety of foods, contains fewer calories, and helps you stay healthy  A healthy meal plan includes the following:  · Eat whole-grain foods more often  A healthy meal plan should contain fiber  Fiber is the part of grains, fruits, and vegetables that is not broken down by your body  Whole-grain foods are healthy and provide extra fiber in your diet  Some examples of whole-grain foods are whole-wheat breads and pastas, oatmeal, brown rice, and bulgur  · Eat a variety of vegetables every day  Include dark, leafy greens such as spinach, kale, tali greens, and mustard greens  Eat yellow and orange vegetables such as carrots, sweet potatoes, and winter squash  · Eat a variety of fruits every day  Choose fresh or canned fruit (canned in its own juice or light syrup) instead of juice  Fruit juice has very little or no fiber  · Eat low-fat dairy foods  Drink fat-free (skim) milk or 1% milk  Eat fat-free yogurt and low-fat cottage cheese  Try low-fat cheeses such as mozzarella and other reduced-fat cheeses  · Choose meat and other protein foods that are low in fat  Choose beans or other legumes such as split peas or lentils  Choose fish, skinless poultry (chicken or turkey), or lean cuts of red meat (beef or pork)  Before you cook meat or poultry, cut off any visible fat  · Use less fat and oil  Try baking foods instead of frying them  Add less fat, such as margarine, sour cream, regular salad dressing and mayonnaise to foods  Eat fewer high-fat foods  Some examples of high-fat foods include french fries, doughnuts, ice cream, and cakes  · Eat fewer sweets    Limit foods and drinks that are high in sugar  This includes candy, cookies, regular soda, and sweetened drinks  Exercise:  Exercise at least 30 minutes per day on most days of the week  Some examples of exercise include walking, biking, dancing, and swimming  You can also fit in more physical activity by taking the stairs instead of the elevator or parking farther away from stores  Ask your healthcare provider about the best exercise plan for you  © Copyright [employer] 2018 Information is for End User's use only and may not be sold, redistributed or otherwise used for commercial purposes   All illustrations and images included in CareNotes® are the copyrighted property of A D A M [employer] Inc  or [facility] Shruti Paul   To follow up with test results

## 2021-04-15 NOTE — PROGRESS NOTES
Assessment/Plan:       No problem-specific Assessment & Plan notes found for this encounter  Diagnoses and all orders for this visit:    Medicare annual wellness visit, subsequent    Osteoarthritis of right shoulder, unspecified osteoarthritis type    Chronic right shoulder pain  Comments:  Improving  Continue physical therapy, to follow if any further symptoms    High magnesium levels  -     Comprehensive metabolic panel; Future  -     Magnesium; Future    Low serum parathyroid hormone (PTH)  Comments:  corrected    Type 2 diabetes mellitus with other specified complication, without long-term current use of insulin (HCC)  Comments: To follow with 1800 calorie  Orders:  -     Comprehensive metabolic panel; Future  -     Hemoglobin A1C; Future  -     Microalbumin / creatinine urine ratio    Essential hypertension  Comments: Well controlled at home ,to follow with the dash diet    Liver cyst  Comments:  Liver cysts stable    Low vitamin D level  Comments:  Continue vitamin-D supplement  Orders:  -     Vitamin D 25 hydroxy; Future    Asymptomatic microscopic hematuria  Comments: Following with Urology    Peripheral vascular disease [facility])  Comments:  Patient is asymptomatic to call if she developed claudication    Chronic renal impairment, stage 3a  Comments:  Avoid NSAID  Hydrate self well  Again discussed referral to Nephrology  Patient declined  Orders:  -     Comprehensive metabolic panel; Future  -     CBC and differential; Future  -     Vitamin D 25 hydroxy; Future  -     Magnesium; Future        Patient Instructions       Medicare Preventive Visit Patient Instructions  Thank you for completing your Welcome to Medicare Visit or Medicare Annual Wellness Visit today  Your next wellness visit will be due in one year (4/16/2022)  The screening/preventive services that you may require over the next 5-10 years are detailed below  Some tests may not apply to you based off risk factors and/or age   Screening tests ordered at today's visit but not completed yet may show as past due  Also, please note that scanned in results may not display below  Preventive Screenings:  Service Recommendations Previous Testing/Comments   Colorectal Cancer Screening  * Colonoscopy    * Fecal Occult Blood Test (FOBT)/Fecal Immunochemical Test (FIT)  * Fecal DNA/Cologuard Test  * Flexible Sigmoidoscopy Age: 54-65 years old   Colonoscopy: every 10 years (may be performed more frequently if at higher risk)  OR  FOBT/FIT: every 1 year  OR  Cologuard: every 3 years  OR  Sigmoidoscopy: every 5 years  Screening may be recommended earlier than age 48 if at higher risk for colorectal cancer  Also, an individualized decision between you and your healthcare provider will decide whether screening between the ages of 74-80 would be appropriate  Colonoscopy: Not on file  FOBT/FIT: Not on file  Cologuard: Not on file  Sigmoidoscopy: Not on file          Breast Cancer Screening Age: 36 years old  Frequency: every 1-2 years  Not required if history of left and right mastectomy Mammogram: 07/29/2020        Cervical Cancer Screening Between the ages of 21-29, pap smear recommended once every 3 years  Between the ages of 33-67, can perform pap smear with HPV co-testing every 5 years  Recommendations may differ for women with a history of total hysterectomy, cervical cancer, or abnormal pap smears in past  Pap Smear: Not on file        Hepatitis C Screening Once for adults born between 1945 and 1965  More frequently in patients at high risk for Hepatitis C Hep C Antibody: 07/09/2020        Diabetes Screening 1-2 times per year if you're at risk for diabetes or have pre-diabetes Fasting glucose: No results in last 5 years   A1C: 6 2        Cholesterol Screening Once every 5 years if you don't have a lipid disorder  May order more often based on risk factors  Lipid panel: 01/16/2019          Other Preventive Screenings Covered by Medicare:  1   Abdominal Aortic Aneurysm (AAA) Screening: covered once if your at risk  You're considered to be at risk if you have a family history of AAA  2  Lung Cancer Screening: covers low dose CT scan once per year if you meet all of the following conditions: (1) Age 50-69; (2) No signs or symptoms of lung cancer; (3) Current smoker or have quit smoking within the last 15 years; (4) You have a tobacco smoking history of at least 30 pack years (packs per day multiplied by number of years you smoked); (5) You get a written order from a healthcare provider  3  Glaucoma Screening: covered annually if you're considered high risk: (1) You have diabetes OR (2) Family history of glaucoma OR (3)  aged 48 and older OR (3)  American aged 72 and older  3  Osteoporosis Screening: covered every 2 years if you meet one of the following conditions: (1) You're estrogen deficient and at risk for osteoporosis based off medical history and other findings; (2) Have a vertebral abnormality; (3) On glucocorticoid therapy for more than 3 months; (4) Have primary hyperparathyroidism; (5) On osteoporosis medications and need to assess response to drug therapy  · Last bone density test (DXA Scan): 07/29/2020   5  HIV Screening: covered annually if you're between the age of 15-65  Also covered annually if you are younger than 13 and older than 72 with risk factors for HIV infection  For pregnant patients, it is covered up to 3 times per pregnancy  Immunizations:  Immunization Recommendations   Influenza Vaccine Annual influenza vaccination during flu season is recommended for all persons aged >= 6 months who do not have contraindications   Pneumococcal Vaccine (Prevnar and Pneumovax)  * Prevnar = PCV13  * Pneumovax = PPSV23   Adults 25-60 years old: 1-3 doses may be recommended based on certain risk factors  Adults 72 years old: Prevnar (PCV13) vaccine recommended followed by Pneumovax (PPSV23) vaccine   If already received PPSV23 since turning 65, then PCV13 recommended at least one year after PPSV23 dose  Hepatitis B Vaccine 3 dose series if at intermediate or high risk (ex: diabetes, end stage renal disease, liver disease)   Tetanus (Td) Vaccine - COST NOT COVERED BY MEDICARE PART B Following completion of primary series, a booster dose should be given every 10 years to maintain immunity against tetanus  Td may also be given as tetanus wound prophylaxis  Tdap Vaccine - COST NOT COVERED BY MEDICARE PART B Recommended at least once for all adults  For pregnant patients, recommended with each pregnancy  Shingles Vaccine (Shingrix) - COST NOT COVERED BY MEDICARE PART B  2 shot series recommended in those aged 48 and above     Health Maintenance Due:      Topic Date Due    Hepatitis C Screening  Completed     Immunizations Due:  There are no preventive care reminders to display for this patient  Advance Directives   What are advance directives? Advance directives are legal documents that state your wishes and plans for medical care  These plans are made ahead of time in case you lose your ability to make decisions for yourself  Advance directives can apply to any medical decision, such as the treatments you want, and if you want to donate organs  What are the types of advance directives? There are many types of advance directives, and each state has rules about how to use them  You may choose a combination of any of the following:  · Living will: This is a written record of the treatment you want  You can also choose which treatments you do not want, which to limit, and which to stop at a certain time  This includes surgery, medicine, IV fluid, and tube feedings  · Durable power of  for healthcare [facility]): This is a written record that states who you want to make healthcare choices for you when you are unable to make them for yourself  This person, called a proxy, is usually a family member or a friend   You may choose more than 1 proxy  · Do not resuscitate (DNR) order:  A DNR order is used in case your heart stops beating or you stop breathing  It is a request not to have certain forms of treatment, such as CPR  A DNR order may be included in other types of advance directives  · Medical directive: This covers the care that you want if you are in a coma, near death, or unable to make decisions for yourself  You can list the treatments you want for each condition  Treatment may include pain medicine, surgery, blood transfusions, dialysis, IV or tube feedings, and a ventilator (breathing machine)  · Values history: This document has questions about your views, beliefs, and how you feel and think about life  This information can help others choose the care that you would choose  Why are advance directives important? An advance directive helps you control your care  Although spoken wishes may be used, it is better to have your wishes written down  Spoken wishes can be misunderstood, or not followed  Treatments may be given even if you do not want them  An advance directive may make it easier for your family to make difficult choices about your care  Weight Management   Why it is important to manage your weight:  Being overweight increases your risk of health conditions such as heart disease, high blood pressure, type 2 diabetes, and certain types of cancer  It can also increase your risk for osteoarthritis, sleep apnea, and other respiratory problems  Aim for a slow, steady weight loss  Even a small amount of weight loss can lower your risk of health problems  How to lose weight safely:  A safe and healthy way to lose weight is to eat fewer calories and get regular exercise  You can lose up about 1 pound a week by decreasing the number of calories you eat by 500 calories each day  Healthy meal plan for weight management:  A healthy meal plan includes a variety of foods, contains fewer calories, and helps you stay healthy  A healthy meal plan includes the following:  · Eat whole-grain foods more often  A healthy meal plan should contain fiber  Fiber is the part of grains, fruits, and vegetables that is not broken down by your body  Whole-grain foods are healthy and provide extra fiber in your diet  Some examples of whole-grain foods are whole-wheat breads and pastas, oatmeal, brown rice, and bulgur  · Eat a variety of vegetables every day  Include dark, leafy greens such as spinach, kale, tali greens, and mustard greens  Eat yellow and orange vegetables such as carrots, sweet potatoes, and winter squash  · Eat a variety of fruits every day  Choose fresh or canned fruit (canned in its own juice or light syrup) instead of juice  Fruit juice has very little or no fiber  · Eat low-fat dairy foods  Drink fat-free (skim) milk or 1% milk  Eat fat-free yogurt and low-fat cottage cheese  Try low-fat cheeses such as mozzarella and other reduced-fat cheeses  · Choose meat and other protein foods that are low in fat  Choose beans or other legumes such as split peas or lentils  Choose fish, skinless poultry (chicken or turkey), or lean cuts of red meat (beef or pork)  Before you cook meat or poultry, cut off any visible fat  · Use less fat and oil  Try baking foods instead of frying them  Add less fat, such as margarine, sour cream, regular salad dressing and mayonnaise to foods  Eat fewer high-fat foods  Some examples of high-fat foods include french fries, doughnuts, ice cream, and cakes  · Eat fewer sweets  Limit foods and drinks that are high in sugar  This includes candy, cookies, regular soda, and sweetened drinks  Exercise:  Exercise at least 30 minutes per day on most days of the week  Some examples of exercise include walking, biking, dancing, and swimming  You can also fit in more physical activity by taking the stairs instead of the elevator or parking farther away from stores   Ask your healthcare provider about the best exercise plan for you  © Copyright [employer] 2018 Information is for End User's use only and may not be sold, redistributed or otherwise used for commercial purposes  All illustrations and images included in CareNotes® are the copyrighted property of [employer]  or [employer]  To follow up with test results      Orders Placed This Encounter   Procedures    Comprehensive metabolic panel     This is a patient instruction: Patient fasting for 8 hours or longer recommended  Standing Status:   Future     Standing Expiration Date:   4/15/2022    CBC and differential     This is a patient instruction: This test is non-fasting  Please drink two glasses of water morning of bloodwork  Standing Status:   Future     Standing Expiration Date:   4/15/2022    Hemoglobin A1C     Standing Status:   Future     Standing Expiration Date:   4/15/2022    Microalbumin / creatinine urine ratio    Vitamin D 25 hydroxy     Standing Status:   Future     Standing Expiration Date:   4/15/2022    Magnesium     Standing Status:   Future     Standing Expiration Date:   4/15/2022         Subjective:     Patient ID: Deon Regalado is a 79 y o  female      HPI    Hypertension patient stated her blood pressure at home is well controlled her blood pressure  The 117/70  She get little excited when she comes to the doctor's office  Denied headache or dizziness  Diabetes  She is not checking her blood sugar at home denied polyuria or polydipsia  She try not to eat too much sweet or carbohydrate  Denied sign of symptom of hypoglycemia  Right shoulder pain  Chronic  Having physical therapy patient satisfied with her therapy because her pain is improving  Hyperlipidemia denied side effect with Crestor admit to regular fat intake  Glaucoma  Denied change in vision  She is following with eye doctor  Microscopic hematuria    Denied hematuria, dysuria or urinary frequency she is going to have renal ultrasound in September and then she is going to see her Urology after the    Test results    X-ray of the right shoulder  Arterial Doppler of the lower ext  Lab done on March 2021  Discussed result with patient    Review of Systems   Constitutional: Negative for activity change, appetite change, chills, fatigue, fever and unexpected weight change  HENT: Negative for congestion, ear discharge, ear pain, hearing loss, nosebleeds, rhinorrhea, sinus pressure, sore throat, tinnitus, trouble swallowing and voice change  Eyes: Negative for photophobia, pain and visual disturbance  Respiratory: Negative for cough, chest tightness, shortness of breath and wheezing  Cardiovascular: Negative for chest pain, palpitations and leg swelling  Gastrointestinal: Negative for abdominal pain, anal bleeding, blood in stool, constipation, diarrhea, nausea and vomiting  Endocrine: Negative for cold intolerance, heat intolerance, polydipsia and polyuria  Genitourinary: Negative for dysuria, frequency, hematuria and urgency  Musculoskeletal: Negative for back pain, gait problem, joint swelling, myalgias and neck pain  Skin: Negative for rash  Neurological: Negative for dizziness, tremors, seizures, syncope, weakness, light-headedness and headaches  Hematological: Negative for adenopathy  Does not bruise/bleed easily  Psychiatric/Behavioral: Negative for agitation, behavioral problems, confusion, dysphoric mood, hallucinations and sleep disturbance  The patient is not nervous/anxious  Objective:     Physical Exam  Nursing note reviewed  Constitutional:       General: She is not in acute distress  Appearance: Normal appearance  She is well-developed  She is not ill-appearing  HENT:      Head: Normocephalic  Eyes:      General: No scleral icterus  Extraocular Movements: Extraocular movements intact  Conjunctiva/sclera: Conjunctivae normal    Neck:      Musculoskeletal: Neck supple  Thyroid: No thyromegaly  Vascular: No carotid bruit  Cardiovascular:      Rate and Rhythm: Normal rate and regular rhythm  Heart sounds: Normal heart sounds  Pulmonary:      Effort: Pulmonary effort is normal       Breath sounds: Normal breath sounds  Abdominal:      General: Bowel sounds are normal  There is no distension  Palpations: Abdomen is soft  There is no mass  Tenderness: There is no abdominal tenderness  There is no guarding or rebound  Hernia: No hernia is present  Musculoskeletal:         General: No swelling or tenderness  Right lower leg: No edema  Left lower leg: No edema  Comments: Right shoulder  Patient has full range of motion   Lymphadenopathy:      Cervical: No cervical adenopathy  Skin:     Findings: No rash  Neurological:      General: No focal deficit present  Mental Status: She is alert and oriented to person, place, and time  Cranial Nerves: No cranial nerve deficit  Motor: No abnormal muscle tone  Coordination: Coordination normal       Gait: Gait normal    Psychiatric:         Mood and Affect: Mood normal          Behavior: Behavior normal          Thought Content:  Thought content normal          Judgment: Judgment normal

## 2021-04-15 NOTE — PROGRESS NOTES
Assessment and Plan:     Problem List Items Addressed This Visit        Digestive    Liver cyst       Endocrine    Diabetes ([facility] [address] )    Relevant Orders    Comprehensive metabolic panel    Hemoglobin A1C    Microalbumin / creatinine urine ratio       Cardiovascular and Mediastinum    Essential hypertension    Peripheral vascular disease (HCC)       Genitourinary    Asymptomatic microscopic hematuria    Chronic renal insufficiency    Relevant Orders    Comprehensive metabolic panel    CBC and differential    Vitamin D 25 hydroxy    Magnesium       Other    Low vitamin D level    Relevant Orders    Vitamin D 25 hydroxy      Other Visit Diagnoses     Medicare annual wellness visit, subsequent    -  Primary    Osteoarthritis of right shoulder, unspecified osteoarthritis type        Chronic right shoulder pain        Improving  Continue physical therapy, to follow if any further symptoms    High magnesium levels        Relevant Orders    Comprehensive metabolic panel    Magnesium    Low serum parathyroid hormone (PTH)        corrected           Preventive health issues were discussed with patient, and age appropriate screening tests were ordered as noted in patient's After Visit Summary  Personalized health advice and appropriate referrals for health education or preventive services given if needed, as noted in patient's After Visit Summary       History of Present Illness:     Patient presents for Medicare Annual Wellness visit    Patient Care Team:  Koby Hollingsworth MD as PCP - MD Jet Lugo MD (Gastroenterology)  Joellen Anna MD (Urology)  Dietrich Romberg, MD (Ophthalmology)     Problem List:     Patient Active Problem List   Diagnosis    Hyperlipidemia    Diabetes [facility])    Chronic renal insufficiency    Fatty liver    Osteoporosis    Diverticulosis    Peripheral vascular disease of lower extremity ([facility] [address] )    Multiple renal cysts    Liver cyst    Glaucoma    Cataract    Colonic polyp  Hearing loss    GERD with esophagitis    Essential hypertension    Peripheral vascular disease (HCC)    Encounter for screening mammogram for breast cancer    Over weight    Cyst of right kidney    Need for immunization against influenza    Leukopenia    Low vitamin D level    Abnormal urine cytology    Asymptomatic microscopic hematuria      Past Medical and Surgical History:     Past Medical History:   Diagnosis Date    Hypertension      Past Surgical History:   Procedure Laterality Date    TUBAL LIGATION Bilateral       Family History:     Family History   Problem Relation Age of Onset    Breast cancer Mother 46    Heart attack Father     Coronary artery disease Father     Colon polyps Brother     No Known Problems Maternal Grandmother     No Known Problems Maternal Grandfather     No Known Problems Paternal Grandmother     No Known Problems Paternal Grandfather     No Known Problems Son     No Known Problems Maternal Aunt     No Known Problems Maternal Aunt     No Known Problems Maternal Aunt     No Known Problems Maternal Aunt     No Known Problems Maternal Aunt       Social History:        Social History     Socioeconomic History    Marital status:      Spouse name: None    Number of children: None    Years of education: None    Highest education level: None   Occupational History    None   Social Needs    Financial resource strain: None    Food insecurity     Worry: None     Inability: None    Transportation needs     Medical: None     Non-medical: None   Tobacco Use    Smoking status: Former Smoker     Types: Cigarettes    Smokeless tobacco: Former User    Tobacco comment: quit smoking oct /20 /2003   Substance and Sexual Activity    Alcohol use: Yes     Comment: ocassionally    Drug use: No    Sexual activity: Not Currently   Lifestyle    Physical activity     Days per week: None     Minutes per session: None    Stress: None   Relationships    Social connections     Talks on phone: None     Gets together: None     Attends Jainism service: None     Active member of club or organization: None     Attends meetings of clubs or organizations: None     Relationship status: None    Intimate partner violence     Fear of current or ex partner: None     Emotionally abused: None     Physically abused: None     Forced sexual activity: None   Other Topics Concern    None   Social History Narrative    None      Medications and Allergies:     Current Outpatient Medications   Medication Sig Dispense Refill    ASPERCREME LIDOCAINE EX Apply topically      aspirin (Aspirin 81) 81 mg EC tablet       bimatoprost (LUMIGAN) 0 01 % ophthalmic drops Apply 1 drop to eye Daily      brimonidine (ALPHAGAN P) 0 1 % Apply 1 drop to eye every 12 (twelve) hours      Calcium Carbonate-Vitamin D (CALTRATE 600+D PO) Take 2 tablets by mouth daily       cholecalciferol (VITAMIN D3) 1,000 units tablet Take 1,000 Units by mouth daily      diltiazem (CARDIZEM LA) 240 MG 24 hr tablet Take 1 tablet (240 mg total) by mouth daily 90 tablet 1    ezetimibe (ZETIA) 10 mg tablet Take 1 tablet (10 mg total) by mouth daily 90 tablet 3    Multiple Vitamins-Minerals (CENTRUM SILVER ULTRA WOMENS PO) Take 1 tablet by mouth daily      olmesartan (Benicar) 20 mg tablet Take 1 tablet (20 mg total) by mouth daily 90 tablet 3    rosuvastatin (CRESTOR) 10 MG tablet Take 1 tablet (10 mg total) by mouth daily 90 tablet 3     No current facility-administered medications for this visit  Allergies   Allergen Reactions    Lansoprazole Diarrhea    Naproxen     Nsaids      Other reaction(s): renal dysfunction  Category: Adverse Reaction;     Sulfa Antibiotics Hives    Trimethoprim     Amoxicillin Fever and Rash     Category:  Allergy;       Immunizations:     Immunization History   Administered Date(s) Administered    Fluzone Split Quad 0 25 mL 09/22/2016    INFLUENZA 10/13/2015, 09/22/2016, 09/14/2017, 10/25/2018    Influenza Split 10/17/2013    Influenza Split High Dose Preservative Free IM 09/14/2017    Influenza, high dose seasonal 0 7 mL 10/25/2018, 10/02/2019, 10/14/2020    Influenza, seasonal, injectable 09/23/2011, 10/29/2014    Pneumococcal Conjugate 13-Valent 09/14/2017    Pneumococcal Polysaccharide PPV23 11/18/2015    SARS-CoV-2 / COVID-19 mRNA IM ([facility]) 01/21/2021, 02/18/2021    Tdap 08/05/2019      Health Maintenance:         Topic Date Due    Hepatitis C Screening  Completed     There are no preventive care reminders to display for this patient  Medicare Health Risk Assessment:     /80   Pulse 94   Temp (!) 96 8 °F (36 °C) (Temporal)   Ht 5' 6" (1 676 m)   Wt 81 1 kg (178 lb 12 8 oz)   SpO2 97%   BMI 28 86 kg/m²      Florence Sanchez is here for her Subsequent Wellness visit  Health Risk Assessment:   Patient rates overall health as good  Patient feels that their physical health rating is same  Patient is satisfied with their life  Eyesight was rated as same  Hearing was rated as same  Patient feels that their emotional and mental health rating is same  Patients states they are never, rarely angry  Patient states they are never, rarely unusually tired/fatigued  Pain experienced in the last 7 days has been some  Patient's pain rating has been 2/10  Patient states that she has experienced no weight loss or gain in last 6 months  Chronic right shoulder pain , improving , pt is having physical therapy    Depression Screening:   PHQ-2 Score: 0      Fall Risk Screening: In the past year, patient has experienced: no history of falling in past year      Urinary Incontinence Screening:   Patient has not leaked urine accidently in the last six months  Home Safety:  Patient does not have trouble with stairs inside or outside of their home  Patient has working smoke alarms and has working carbon monoxide detector  Home safety hazards include: none       Nutrition:   Current diet is Regular and Limited junk food  Medications:   Patient is currently taking over-the-counter supplements  OTC medications include: see medication list  Patient is able to manage medications  Activities of Daily Living (ADLs)/Instrumental Activities of Daily Living (IADLs):   Walk and transfer into and out of bed and chair?: Yes  Dress and groom yourself?: Yes    Bathe or shower yourself?: Yes    Feed yourself? Yes  Do your laundry/housekeeping?: Yes  Manage your money, pay your bills and track your expenses?: Yes  Make your own meals?: Yes    Do your own shopping?: Yes    Previous Hospitalizations:   Any hospitalizations or ED visits within the last 12 months?: No      Advance Care Planning:   Living will: Yes    Durable POA for healthcare:  Yes    Advanced directive: Yes      Cognitive Screening:   Provider or family/friend/caregiver concerned regarding cognition?: No    PREVENTIVE SCREENINGS      Cardiovascular Screening:    General: History Lipid Disorder and Screening Current      Diabetes Screening:     General: History Diabetes and Screening Current      Colorectal Cancer Screening:     General: Risks and Benefits Discussed and Screening Current      Breast Cancer Screening:     General: Screening Current      Cervical Cancer Screening:    General: Screening Not Indicated      Osteoporosis Screening:    General: History Osteoporosis and Screening Current      Abdominal Aortic Aneurysm (AAA) Screening:        General: Risks and Benefits Discussed and Screening Not Indicated      Lung Cancer Screening:     General: Screening Not Indicated      Hepatitis C Screening:    General: Screening Current      Preventive Screening Comments: She had colonoscopy in 2017    Screening, Brief Intervention, and Referral to Treatment (SBIRT)    Screening    Typical number of drinks in a week: 2    Single Item Drug Screening:  How often have you used an illegal drug (including marijuana) or a prescription medication for non-medical reasons in the past year? never    Single Item Drug Screen Score: 0  Interpretation: Negative screen for possible drug use disorder    Other Counseling Topics:   Car/seat belt/driving safety, skin self-exam, sunscreen and calcium and vitamin D intake and regular weightbearing exercise         Gisselle Son MD

## 2021-04-16 ENCOUNTER — OFFICE VISIT (OUTPATIENT)
Dept: PHYSICAL THERAPY | Facility: REHABILITATION | Age: 81
End: 2021-04-16
Payer: MEDICARE

## 2021-04-16 DIAGNOSIS — M19.011 OSTEOARTHRITIS OF RIGHT SHOULDER, UNSPECIFIED OSTEOARTHRITIS TYPE: ICD-10-CM

## 2021-04-16 DIAGNOSIS — M19.011 PRIMARY OSTEOARTHRITIS OF RIGHT SHOULDER: Primary | ICD-10-CM

## 2021-04-16 PROCEDURE — 97110 THERAPEUTIC EXERCISES: CPT | Performed by: PHYSICAL THERAPIST

## 2021-04-16 PROCEDURE — 97112 NEUROMUSCULAR REEDUCATION: CPT | Performed by: PHYSICAL THERAPIST

## 2021-04-16 PROCEDURE — 97140 MANUAL THERAPY 1/> REGIONS: CPT | Performed by: PHYSICAL THERAPIST

## 2021-04-16 NOTE — PROGRESS NOTES
Daily Note     Today's date: 2021  Patient name: Jenni Gutierrez  : 1940  MRN: [id number]  Referring provider: Be Scott DO  Dx:   Encounter Diagnosis     ICD-10-CM    1  Primary osteoarthritis of right shoulder  M19 011    2  Osteoarthritis of right shoulder, unspecified osteoarthritis type  M19 011                   Subjective: Pt comes to therapy reporting she notices some improvements here and there with lifting arm  Objective: See treatment diary below      Assessment: Tolerated treatment well  Patient demonstrated fatigue post treatment, exhibited good technique with therapeutic exercises and would benefit from continued PT      Plan: Progress treatment as tolerated         Precautions: HTN    Daily Treatment Diary    Date 3/18 3/23 3/26 3/30 4/1 4/6 4/8 4/13 4/16    FOTO IE    nv perf       Re-Eval IE               Manuals    GHJ post & inf mobs RUBEN Gr IV RUBEN Gr IV RUBEN Gr IV  RUBEN Gr IV RUBEN Gr IV RUBEN Gr IV NC Gr IV RUBEN Gr IV    ACJ mobs   RUBEN Gr IV  RUBEN Gr IV RUBEN Gr IV RUBEN Gr IV NC  Gr IV RUBEN Gr IV    R Shoulder PROM    MM                      Neuro Re-Ed     scap retraction  3"x20 RTB x20 RTB x20 gtb 2x10 gtb 2x10 gtb 2x10 Blue web slide  20 Blue web slide  30    TB shoulder ext     gtb 2x10 gtb 2x10 gtb 2x15 [employer] slide  20 [employer] slide  30    Sidelying ER  nv 2x10 aarom 2x10 a/aarom nv nv  2 x 10 3x10    TB no papa       rtb 1x10                                                          Ther Ex    Wand flex   3"x20 3"x20 3"x20 3"x20 D/C       Wand scaption  3"x20 3"x20 3"x20 3"x20 D/C       Wand extension     3"x20 D/C       TB IR/ER  RTB  ER x8  IR x20 RTB  ER x10  IR x20 RTB  ER x15  IR x20 RTB  ER x15  IR x20 RTB  ER x20  IR x20 RTB  ER x20  IR x20 Web slide  IR-green  2 x 12   ER-red  2 x 12 ER/IR red 2x15 ea AAROM ER    TB flex to 90*  2# x10  aarom 2# x10 aarom 2# x15 a/aarom 2# x10/8  A/AA 2# 2x10  A/AA 2# x20 AROM 2#   x 20 AROM 2#   x 30 AROM    TB scap to 90*  2# x10 aarom 2# 2x5 aarom 2# 10x a/aarom 2# x10/8  A/AA 2# 2x10  A/AA 2# x20 AROM 2#  x20  AROM 0#  X 30 AROM                                           Ther Activity    Pulleys   5' 5' 5' 5' 5' 5' 5' 5'                 Gait Training                              Modalities

## 2021-04-19 ENCOUNTER — OFFICE VISIT (OUTPATIENT)
Dept: PHYSICAL THERAPY | Facility: REHABILITATION | Age: 81
End: 2021-04-19
Payer: MEDICARE

## 2021-04-19 DIAGNOSIS — M19.011 OSTEOARTHRITIS OF RIGHT SHOULDER, UNSPECIFIED OSTEOARTHRITIS TYPE: ICD-10-CM

## 2021-04-19 DIAGNOSIS — M19.011 PRIMARY OSTEOARTHRITIS OF RIGHT SHOULDER: Primary | ICD-10-CM

## 2021-04-19 PROCEDURE — 97110 THERAPEUTIC EXERCISES: CPT | Performed by: PHYSICAL THERAPIST

## 2021-04-19 PROCEDURE — 97112 NEUROMUSCULAR REEDUCATION: CPT | Performed by: PHYSICAL THERAPIST

## 2021-04-19 PROCEDURE — 97140 MANUAL THERAPY 1/> REGIONS: CPT | Performed by: PHYSICAL THERAPIST

## 2021-04-19 NOTE — PROGRESS NOTES
Daily Note     Today's date: 2021  Patient name: Ailyn Sahu  : 1940  MRN: [id number]  Referring provider: Eulalia Green DO  Dx:   Encounter Diagnosis     ICD-10-CM    1  Primary osteoarthritis of right shoulder  M19 011    2  Osteoarthritis of right shoulder, unspecified osteoarthritis type  M19 011                   Subjective: Pt comes to therapy noting she was doing some yardwork a few days ago and had moderate amount of soreness the next day  States she noticed the soreness went away after the second day  Objective: See treatment diary below      Assessment: Tolerated treatment well  Continued fatigue and challenge notable with shoulder abduction  Patient demonstrated fatigue post treatment, exhibited good technique with therapeutic exercises and would benefit from continued PT      Plan: Progress treatment as tolerated         Precautions: HTN    Daily Treatment Diary    Date 3/18 3/23 3/26 3/30 4/1 4/6 4/8 4/13 4/16 4/19   FOTO IE    nv perf    nv   Re-Eval IE         nv      Manuals    GHJ post & inf mobs RUBEN Gr IV RUBEN Gr IV RUBEN Gr IV  RUBEN Gr IV RUBEN Gr IV RUBEN Gr IV NC Gr IV RUBEN Gr IV    ACJ mobs   RUBEN Gr IV  RUBEN Gr IV RUBEN Gr IV RUBEN Gr IV NC  Gr IV RUBEN Gr IV    R Shoulder PROM    MM      RUBEN                Neuro Re-Ed     scap retraction  3"x20 RTB x20 RTB x20 gtb 2x10 gtb 2x10 gtb 2x10 Blue web slide  20 Blue web slide  30 Blue web slide  30   TB shoulder ext     gtb 2x10 gtb 2x10 gtb 2x15 [employer] slide  20 [employer] slide  30 Blue web slide  30   Sidelying ER  nv 2x10 aarom 2x10 a/aarom nv nv  2 x 10 3x10 3x10   TB no papa       rtb 1x10                                                          Ther Ex    Wand flex   3"x20 3"x20 3"x20 3"x20 D/C       Wand scaption  3"x20 3"x20 3"x20 3"x20 D/C       Wand extension     3"x20 D/C       TB IR/ER  RTB  ER x8  IR x20 RTB  ER x10  IR x20 RTB  ER x15  IR x20 RTB  ER x15  IR x20 RTB  ER x20  IR x20 RTB  ER x20  IR x20 Web slide  IR-green  2 x 12 ER-red  2 x 12 ER/IR red 2x15 ea AAROM ER ER/IR red 2x15 ea AAROM ER   TB flex to 90*  2# x10  aarom 2# x10 aarom 2# x15 a/aarom 2# x10/8  A/AA 2# 2x10  A/AA 2# x20 AROM 2#   x 20 AROM 2#   x 30 AROM 2#   x 30 AROM   TB scap to 90*  2# x10 aarom 2# 2x5 aarom 2# 10x a/aarom 2# x10/8  A/AA 2# 2x10  A/AA 2# x20 AROM 2#  x20  AROM 0#  X 30 AROM 2#   x 20 AROM                                          Ther Activity    Pulleys   5' 5' 5' 5' 5' 5' 5' 5' 5'                Gait Training                              Modalities

## 2021-04-22 ENCOUNTER — OFFICE VISIT (OUTPATIENT)
Dept: OBGYN CLINIC | Facility: MEDICAL CENTER | Age: 81
End: 2021-04-22
Payer: MEDICARE

## 2021-04-22 ENCOUNTER — OFFICE VISIT (OUTPATIENT)
Dept: PHYSICAL THERAPY | Facility: REHABILITATION | Age: 81
End: 2021-04-22
Payer: MEDICARE

## 2021-04-22 VITALS
DIASTOLIC BLOOD PRESSURE: 75 MMHG | SYSTOLIC BLOOD PRESSURE: 144 MMHG | HEART RATE: 81 BPM | HEIGHT: 66 IN | BODY MASS INDEX: 28.61 KG/M2 | WEIGHT: 178 LBS

## 2021-04-22 DIAGNOSIS — M75.101 TEAR OF RIGHT ROTATOR CUFF, UNSPECIFIED TEAR EXTENT, UNSPECIFIED WHETHER TRAUMATIC: ICD-10-CM

## 2021-04-22 DIAGNOSIS — M19.011 OSTEOARTHRITIS OF RIGHT SHOULDER, UNSPECIFIED OSTEOARTHRITIS TYPE: ICD-10-CM

## 2021-04-22 DIAGNOSIS — M19.011 PRIMARY OSTEOARTHRITIS OF RIGHT SHOULDER: Primary | ICD-10-CM

## 2021-04-22 PROCEDURE — 99213 OFFICE O/P EST LOW 20 MIN: CPT | Performed by: ORTHOPAEDIC SURGERY

## 2021-04-22 PROCEDURE — 97110 THERAPEUTIC EXERCISES: CPT | Performed by: PHYSICAL THERAPIST

## 2021-04-22 PROCEDURE — 97140 MANUAL THERAPY 1/> REGIONS: CPT | Performed by: PHYSICAL THERAPIST

## 2021-04-22 NOTE — PROGRESS NOTES
PT Re-Evaluation     Today's date: 2021  Patient name: Kaley Davidson  : 1940  MRN: [id number]  Referring provider: Tiffanie Sierra DO  Dx:   Encounter Diagnosis     ICD-10-CM    1  Primary osteoarthritis of right shoulder  M19 011    2  Osteoarthritis of right shoulder, unspecified osteoarthritis type  M19 011                   Assessment  Assessment details: Kaley Davidson has been treated in outpatient physical therapy over the past 4 weeks for diagnosis/complaints of Primary osteoarthritis of right shoulder  (primary encounter diagnosis)  Osteoarthritis of right shoulder, unspecified osteoarthritis type  Pt demonstrates increased range of motion, improved strength, decreased pain, and increased activity tolerance  Pt continues to present with decreased shoulder strength, particularly in the rotator cuff and into external rotation, and decreased tolerance to activity  Pt would continue to benefit from skilled physical therapy to address limitations and to achieve goals  Thank you for this referral   Impairments: abnormal coordination, abnormal or restricted ROM, activity intolerance, impaired physical strength and pain with function  Understanding of Dx/Px/POC: good   Prognosis: good    Goals  ST  Patient will report 25% decrease in pain in 4 weeks  - MET  2  Patient will be able to reach to shoulder height shelf without difficulty in 4 weeks  - MET  3  Patient will demonstrate 1/2 grade improvement in strength in 4 weeks  - MET    LT  Patient will be able to perform IADLS without restriction or pain by discharge  2  Patient will be independent in HEP by discharge  3  Patient will be able to return to recreational/work duties without restriction or pain by discharge        Plan  Patient would benefit from: PT eval and skilled PT  Planned modality interventions: cryotherapy and thermotherapy: hydrocollator packs  Planned therapy interventions: IADL retraining, body mechanics training, flexibility, functional ROM exercises, home exercise program, neuromuscular re-education, manual therapy, postural training, strengthening, stretching, therapeutic activities, therapeutic exercise and joint mobilization  Frequency: 2x week  Duration in visits: 8  Duration in weeks: 4  Treatment plan discussed with: patient        Subjective Evaluation    History of Present Illness  Mechanism of injury: 21  Pt reports onset of RUE pain starting approx 9 months ago, denying injury or trauma  Reports she consulted PCP and orthopedic physician  Reports she has had radiographs on R shoulder  Notes she had injection in orthopedic physician's office, which has helped her with reaching overhead      AGGS: lifting weighted items, reaching forward while holding an item  LOC: lateral upper R arm  EASES: avoiding agg motions   Pain  Current pain ratin  At worst pain ratin    Patient Goals  Patient goals for therapy: decreased pain, increased motion and independence with ADLs/IADLs          Objective     Active Range of Motion   Left Shoulder   External rotation BTH: T4   Internal rotation BTB: T4     Right Shoulder   Flexion: 140 degrees   Abduction: 160 degrees   External rotation BTH: T4   Internal rotation BTB: T7     Passive Range of Motion     Right Shoulder   Flexion: WFL  Abduction: WFL  External rotation 90°: 60 degrees   Internal rotation 90°: 50 degrees     Strength/Myotome Testing     Left Shoulder     Planes of Motion   Flexion: 4+   Abduction: 4+   External rotation at 0°: 4+   Internal rotation at 0°: 4+     Isolated Muscles   Biceps: 4+   Triceps: 4+     Right Shoulder     Planes of Motion   Flexion: 4+   Abduction: 4+   External rotation at 0°: 3+   Internal rotation at 0°: 4+     Isolated Muscles   Biceps: 4+   Triceps: 4+              Precautions: HTN    Daily Treatment Diary    Date 4/22 3/23 3/26 3/30 4/1 4/6 4/8 4/13 4/16 4/19   FOTO nv    nv perf    nv   Re-Eval perf         nv Manuals    GHJ post & inf mobs RUBEN Gr IV RUBEN Gr IV RUBEN Gr IV  RUBEN Gr IV RUBEN Gr IV RUBEN Gr IV NC Gr IV RUBEN Gr IV    ACJ mobs   RUBEN Gr IV  RUBEN Gr IV RUBEN Gr IV RUBEN Gr IV NC  Gr IV RUBEN Gr IV    R Shoulder PROM    MM      RUBEN                Neuro Re-Ed     scap retraction Black x20 3"x20 RTB x20 RTB x20 gtb 2x10 gtb 2x10 gtb 2x10 Blue web slide  20 Blue web slide  30 Blue web slide  30   TB shoulder ext Black x20    gtb 2x10 gtb 2x10 gtb 2x15 [employer] slide  20 [employer] slide  30 Blue web slide  30   Sidelying ER 2# x20 nv 2x10 aarom 2x10 a/aarom nv nv  2 x 10 3x10 3x10   TB no papa Pink x20      rtb 1x10                                                          Ther Ex    Wand flex   3"x20 3"x20 3"x20 3"x20 D/C       Wand scaption  3"x20 3"x20 3"x20 3"x20 D/C       Wand extension     3"x20 D/C       TB IR/ER ER/IR red 2x15 ea AAROM ER RTB  ER x8  IR x20 RTB  ER x10  IR x20 RTB  ER x15  IR x20 RTB  ER x15  IR x20 RTB  ER x20  IR x20 RTB  ER x20  IR x20 Web slide  IR-green  2 x 12   ER-red  2 x 12 ER/IR red 2x15 ea AAROM ER ER/IR red 2x15 ea AAROM ER   TB flex to 90* 2#   x 30 AROM 2# x10  aarom 2# x10 aarom 2# x15 a/aarom 2# x10/8  A/AA 2# 2x10  A/AA 2# x20 AROM 2#   x 20 AROM 2#   x 30 AROM 2#   x 30 AROM   TB scap to 90* 2#   x 30 AROM 2# x10 aarom 2# 2x5 aarom 2# 10x a/aarom 2# x10/8  A/AA 2# 2x10  A/AA 2# x20 AROM 2#  x20  AROM 0#  X 30 AROM 2#   x 20 AROM                                          Ther Activity    Pulleys  5' 5' 5' 5' 5' 5' 5' 5' 5' 5'                Gait Training                              Modalities

## 2021-04-22 NOTE — PROGRESS NOTES
Ortho Sports Medicine Shoulder Follow Up Visit     Assesment:   79 y o  female right shoulder moderate glenohumeral joint OA with possible rotator cuff tear    Plan:    Conservative treatment:    Ice to shoulder 1-2 times daily, for 20 minutes at a time  New prescription sent for PT for ROM and strengthening to shoulder, rotator cuff, scapular stabilizers  Imaging: All imaging from today was reviewed by myself and explained to the patient  Injection:    No Injection planned at this time  May consider future corticosteroid injection depending on clinical exam/imaging  Surgery:     No surgery is recommended at this point, continue with conservative treatment plan as noted  Follow up:    Return in about 6 weeks (around 6/3/2021), or if symptoms worsen or fail to improve  Chief Complaint   Patient presents with    Right Shoulder - Follow-up     History of Present Illness: The patient is returns for follow up of right shoulder moderate glenohumeral joint OA  Since the prior visit, She reports Some} improvement  Patient states that her pain has gotten better however she does notice weakness throughout the shoulder  Patient states that when she tried to weed whack outside she had pain that lasted for a few days after  Pain is improved by rest, ice, NSAIDS and physical therapy  Pain is aggravated by overhead activity, reaching back, rotation and lifting  Symptoms include clicking, catching and popping  The patient has weakness  The patient has tried rest, ice, NSAIDS, physical therapy and injection  Shoulder Surgical History:  None    Past Medical, Social and Family History:  Past Medical History:   Diagnosis Date    Hypertension      Past Surgical History:   Procedure Laterality Date    TUBAL LIGATION Bilateral      Allergies   Allergen Reactions    Lansoprazole Diarrhea    Naproxen     Nsaids      Other reaction(s): renal dysfunction  Category:  Adverse Reaction;     Sulfa Antibiotics Hives    Trimethoprim     Amoxicillin Fever and Rash     Category: Allergy;      Current Outpatient Medications on File Prior to Visit   Medication Sig Dispense Refill    ASPERCREME LIDOCAINE EX Apply topically      aspirin (Aspirin 81) 81 mg EC tablet       bimatoprost (LUMIGAN) 0 01 % ophthalmic drops Apply 1 drop to eye Daily      brimonidine (ALPHAGAN P) 0 1 % Apply 1 drop to eye every 12 (twelve) hours      Calcium Carbonate-Vitamin D (CALTRATE 600+D PO) Take 2 tablets by mouth daily       cholecalciferol (VITAMIN D3) 1,000 units tablet Take 1,000 Units by mouth daily      diltiazem (CARDIZEM LA) 240 MG 24 hr tablet Take 1 tablet (240 mg total) by mouth daily 90 tablet 1    ezetimibe (ZETIA) 10 mg tablet Take 1 tablet (10 mg total) by mouth daily 90 tablet 3    Multiple Vitamins-Minerals (CENTRUM SILVER ULTRA WOMENS PO) Take 1 tablet by mouth daily      olmesartan (Benicar) 20 mg tablet Take 1 tablet (20 mg total) by mouth daily 90 tablet 3    rosuvastatin (CRESTOR) 10 MG tablet Take 1 tablet (10 mg total) by mouth daily 90 tablet 3     No current facility-administered medications on file prior to visit        Social History     Socioeconomic History    Marital status:      Spouse name: Not on file    Number of children: Not on file    Years of education: Not on file    Highest education level: Not on file   Occupational History    Not on file   Social Needs    Financial resource strain: Not on file    Food insecurity     Worry: Not on file     Inability: Not on file    Transportation needs     Medical: Not on file     Non-medical: Not on file   Tobacco Use    Smoking status: Former Smoker     Types: Cigarettes    Smokeless tobacco: Former User    Tobacco comment: quit smoking oct /20 /2003   Substance and Sexual Activity    Alcohol use: Yes     Comment: ocassionally    Drug use: No    Sexual activity: Not Currently   Lifestyle    Physical activity     Days per week: Not on file     Minutes per session: Not on file    Stress: Not on file   Relationships    Social connections     Talks on phone: Not on file     Gets together: Not on file     Attends Evangelical service: Not on file     Active member of club or organization: Not on file     Attends meetings of clubs or organizations: Not on file     Relationship status: Not on file    Intimate partner violence     Fear of current or ex partner: Not on file     Emotionally abused: Not on file     Physically abused: Not on file     Forced sexual activity: Not on file   Other Topics Concern    Not on file   Social History Narrative    Not on file       I have reviewed the past medical, surgical, social and family history, medications and allergies as documented in the EMR  Review of systems: ROS is negative other than that noted in the HPI  Constitutional: Negative for fatigue and fever  Physical Exam:    Blood pressure 144/75, pulse 81, height 5' 6" (1 676 m), weight 80 7 kg (178 lb)      General/Constitutional: NAD, well developed, well nourished  HENT: Normocephalic, atraumatic  CV: Intact distal pulses, regular rate  Resp: No respiratory distress or labored breathing  Lymphatic: No lymphadenopathy palpated  Neuro: Alert and Oriented x 3, no focal deficits  Psych: Normal mood, normal affect, normal judgement, normal behavior  Skin: Warm, dry, no rashes, no erythema    Shoulder focused exam:    RIGHT LEFT    Scapula Atrophy Negative Negative     Winging Negative Negative     Protraction Negative Negative    Rotator cuff SS 4+/5 5/5     IS 4+/5 5/5     SubS 5/5 5/5    ROM     170     ER0 60 60     ER90 90    90     IR90 T6    T6     IRb T6    T6    TTP: AC Negative Negative     Biceps Negative Negative     Coracoid Negative Negative    Special Tests: O'Briens Negative Negative     Cordero-shear Negative Negative     Cross body Adduction Negative Negative     Speeds  Negative Negative     Micha's Negative Negative     Whipple Positive Negative       Neer Negative Negative     Massey Negative Negative    Instability: Apprehension & relocation not tested not tested     Load & shift not tested not tested    Other: Crank Negative Negative               UE NV Exam: +2 Radial pulses bilaterally  Sensation intact to light touch C5-T1 bilaterally, Radial/median/ulnar nerve motor intact    Cervical ROM is full without pain, numbness or tingling      Shoulder Imaging    No imaging was performed today      Scribe Attestation    I,:  Anette Gillis am acting as a scribe while in the presence of the attending physician :       I,:  Maynor Jenkins DO personally performed the services described in this documentation    as scribed in my presence :

## 2021-04-26 ENCOUNTER — OFFICE VISIT (OUTPATIENT)
Dept: PHYSICAL THERAPY | Facility: REHABILITATION | Age: 81
End: 2021-04-26
Payer: MEDICARE

## 2021-04-26 DIAGNOSIS — M19.011 OSTEOARTHRITIS OF RIGHT SHOULDER, UNSPECIFIED OSTEOARTHRITIS TYPE: ICD-10-CM

## 2021-04-26 DIAGNOSIS — M19.011 PRIMARY OSTEOARTHRITIS OF RIGHT SHOULDER: Primary | ICD-10-CM

## 2021-04-26 PROCEDURE — 97112 NEUROMUSCULAR REEDUCATION: CPT

## 2021-04-26 PROCEDURE — 97110 THERAPEUTIC EXERCISES: CPT

## 2021-04-26 NOTE — PROGRESS NOTES
Daily Note     Today's date: 2021  Patient name: Eric Young  : 1940  MRN: [id number]  Referring provider: Chayito Patterson DO  Dx:   Encounter Diagnosis     ICD-10-CM    1  Primary osteoarthritis of right shoulder  M19 011    2  Osteoarthritis of right shoulder, unspecified osteoarthritis type  M19 011                   Subjective: Patient noted spasm in R scap a couple days  after last treatment patient not sure if no monies exercises caused muscle spasm around R scap and some soreness in R upper arm  Muscle spasm was noted yesterday morning and diminished by today in  the afternoon some soreness noted in R upper arm post exercises performed today  Objective: See treatment diary below      Assessment: Tolerated treatment fair  VC for correct form with exercises throughout treatment  significant muscle weakness demonstrated in ER muscle group with patient performing s/l ER with 2# weight  Patient only able to perform 4  Reps with 2# weight due to moderate difficulty lifting forearm into ER with 2# weight decreased to 1# weight patient still significantly challenged with added weight performing s/l ER  Patient would benefit from continued PT      Plan: Continue per plan of care        Precautions: HTN    Daily Treatment Diary    Date 4/22 4/26 3/26 3/30 4/1 4/6 4/8 4/13 4/16 4/19   FOTO nv    nv perf    nv   Re-Eval perf         nv      Manuals    GHJ post & inf mobs RUBEN Gr IV  RUBEN Gr IV  RUBEN Gr IV RUBEN Gr IV RUBEN Gr IV NC Gr IV RUBEN Gr IV    ACJ mobs   RUBEN Gr IV  RUBEN Gr IV RUBEN Gr IV RUBEN Gr IV NC  Gr IV RUBEN Gr IV    R Shoulder PROM    MM      RUBEN                Neuro Re-Ed     scap retraction Black x20 Black 20x RTB x20 RTB x20 gtb 2x10 gtb 2x10 gtb 2x10 Blue web slide  20 Blue web slide  30 Blue web slide  30   TB shoulder ext Black x20 Black x20   gtb 2x10 gtb 2x10 gtb 2x15 [employer] slide  20 [employer] slide  30 [employer] slide  30   Sidelying ER 2# x20 1# x16  2#x4 2x10 aarom 2x10 a/aarom nv nv  2 x 10 3x10 3x10   TB no papa Pink x20 orange x15     rtb 1x10                                                          Ther Ex    Wand flex    3"x20 3"x20 3"x20 D/C       Wand scaption   3"x20 3"x20 3"x20 D/C       Wand extension     3"x20 D/C       TB IR/ER ER/IR red 2x15 ea AAROM ER ER/IR red 2x15 ea AAROM ER RTB  ER x10  IR x20 RTB  ER x15  IR x20 RTB  ER x15  IR x20 RTB  ER x20  IR x20 RTB  ER x20  IR x20 Web slide  IR-green  2 x 12   ER-red  2 x 12 ER/IR red 2x15 ea AAROM ER ER/IR red 2x15 ea AAROM ER   TB flex to 90* 2#   x 30 AROM 2#   x 30 AROM 2# x10 aarom 2# x15 a/aarom 2# x10/8  A/AA 2# 2x10  A/AA 2# x20 AROM 2#   x 20 AROM 2#   x 30 AROM 2#   x 30 AROM   TB scap to 90* 2#   x 30 AROM 2#   x 30 AROM 2# 2x5 aarom 2# 10x a/aarom 2# x10/8  A/AA 2# 2x10  A/AA 2# x20 AROM 2#  x20  AROM 0#  X 30 AROM 2#   x 20 AROM                                          Ther Activity    Pulleys  5' 5' 5' 5' 5' 5' 5' 5' 5' 5'                Gait Training                              Modalities

## 2021-04-29 ENCOUNTER — OFFICE VISIT (OUTPATIENT)
Dept: PHYSICAL THERAPY | Facility: REHABILITATION | Age: 81
End: 2021-04-29
Payer: MEDICARE

## 2021-04-29 DIAGNOSIS — M19.011 PRIMARY OSTEOARTHRITIS OF RIGHT SHOULDER: Primary | ICD-10-CM

## 2021-04-29 DIAGNOSIS — M19.011 OSTEOARTHRITIS OF RIGHT SHOULDER, UNSPECIFIED OSTEOARTHRITIS TYPE: ICD-10-CM

## 2021-04-29 PROCEDURE — 97110 THERAPEUTIC EXERCISES: CPT | Performed by: PHYSICAL THERAPIST

## 2021-04-29 PROCEDURE — 97112 NEUROMUSCULAR REEDUCATION: CPT | Performed by: PHYSICAL THERAPIST

## 2021-04-29 NOTE — PROGRESS NOTES
Daily Note     Today's date: 2021  Patient name: Theodore Ricks  : 1940  MRN: [id number]  Referring provider: Rodger Hurt DO  Dx:   Encounter Diagnosis     ICD-10-CM    1  Primary osteoarthritis of right shoulder  M19 011    2  Osteoarthritis of right shoulder, unspecified osteoarthritis type  M19 011                   Subjective: Pt comes to therapy reporting some soreness in her shoulder after performing exercises  Notes continued trouble performing external rotation motions  Objective: See treatment diary below      Assessment: Tolerated treatment well  Issued patient updated HEP  Patient demonstrated fatigue post treatment and would benefit from continued PT      Plan: Progress treatment as tolerated         Precautions: HTN    Daily Treatment Diary    Date 4/22 4/26 4/29 3/30 4/1 4/6 4/8 4/13 4/16 4/19   FOTO nv    nv perf    nv   Re-Eval perf         nv      Manuals    GHJ post & inf mobs RUBEN Gr IV    RUBEN Gr IV RUBEN Gr IV RUBEN Gr IV NC Gr IV RUBEN Gr IV    ACJ mobs     RUBEN Gr IV RUBEN Gr IV RUBEN Gr IV NC  Gr IV RUBEN Gr IV    R Shoulder PROM    MM      RUBEN                Neuro Re-Ed     scap retraction Black x20 Black 20x Black x30 RTB x20 gtb 2x10 gtb 2x10 gtb 2x10 Blue web slide  20 Blue web slide  30 Blue web slide  30   TB shoulder ext Black x20 Black x20 Black x30  gtb 2x10 gtb 2x10 gtb 2x15 [employer] slide  20 [employer] slide  30 [employer] slide  30   Sidelying ER 2# x20 1# x16  2#x4 1# x20 2x10 a/aarom nv nv  2 x 10 3x10 3x10   TB no papa Pink x20 orange x15     rtb 1x10      Prone I, T   1# x20 ea          Ball on wall - vert, horiz, CW, CCW   x20 ea yellow ball                                    Ther Ex    Wand flex     3"x20 3"x20 D/C       Wand scaption    3"x20 3"x20 D/C       Wand extension     3"x20 D/C       TB IR/ER ER/IR red 2x15 ea AAROM ER ER/IR red 2x15 ea AAROM ER RTB  ER x20 AAROM RTB  ER x15  IR x20 RTB  ER x15  IR x20 RTB  ER x20  IR x20 RTB  ER x20  IR x20 Web slide  IR-green  2 x 12 ER-red  2 x 12 ER/IR red 2x15 ea AAROM ER ER/IR red 2x15 ea AAROM ER   TB flex to 90* 2#   x 30 AROM 2#   x 30 AROM 2# x30 AROM 2# x15 a/aarom 2# x10/8  A/AA 2# 2x10  A/AA 2# x20 AROM 2#   x 20 AROM 2#   x 30 AROM 2#   x 30 AROM   TB scap to 90* 2#   x 30 AROM 2#   x 30 AROM 2# x30 AROM 2# 10x a/aarom 2# x10/8  A/AA 2# 2x10  A/AA 2# x20 AROM 2#  x20  AROM 0#  X 30 AROM 2#   x 20 AROM                                          Ther Activity    Pulleys  5' 5' 5' 5' 5' 5' 5' 5' 5' 5'                Gait Training                              Modalities

## 2021-05-04 ENCOUNTER — OFFICE VISIT (OUTPATIENT)
Dept: PHYSICAL THERAPY | Facility: REHABILITATION | Age: 81
End: 2021-05-04
Payer: MEDICARE

## 2021-05-04 DIAGNOSIS — M19.011 PRIMARY OSTEOARTHRITIS OF RIGHT SHOULDER: Primary | ICD-10-CM

## 2021-05-04 DIAGNOSIS — M19.011 OSTEOARTHRITIS OF RIGHT SHOULDER, UNSPECIFIED OSTEOARTHRITIS TYPE: ICD-10-CM

## 2021-05-04 PROCEDURE — 97110 THERAPEUTIC EXERCISES: CPT | Performed by: PHYSICAL THERAPIST

## 2021-05-04 PROCEDURE — 97112 NEUROMUSCULAR REEDUCATION: CPT | Performed by: PHYSICAL THERAPIST

## 2021-05-04 NOTE — PROGRESS NOTES
Daily Note     Today's date: 2021  Patient name: Ariel Arteaga  : 1940  MRN: [id number]  Referring provider: Ryan Moe DO  Dx:   Encounter Diagnosis     ICD-10-CM    1  Primary osteoarthritis of right shoulder  M19 011    2  Osteoarthritis of right shoulder, unspecified osteoarthritis type  M19 011                   Subjective: Pt reports she performed several household chores yesterday, noting only mild soreness in shoulder  Denies notable soreness or discomfort upon arrival to therapy today  Objective: See treatment diary below      Assessment: Tolerated treatment well  Patient demonstrated fatigue post treatment, exhibited good technique with therapeutic exercises and would benefit from continued PT      Plan: Progress treatment as tolerated         Precautions: HTN    Daily Treatment Diary    Date    FOTO nv    nv perf    nv   Re-Eval perf         nv      Manuals    GHJ post & inf mobs RUBEN Gr IV    RUBEN Gr IV RUBEN Gr IV RUBEN Gr IV NC Gr IV RUBEN Gr IV    ACJ mobs     RUBEN Gr IV RUBEN Gr IV RUBEN Gr IV NC  Gr IV RUBEN Gr IV    R Shoulder PROM          RUBEN                Neuro Re-Ed     scap retraction Black x20 Black 20x Black x30 Black x30 gtb 2x10 gtb 2x10 gtb 2x10 [employer] slide  20 [employer] slide  30 [employer] slide  30   TB shoulder ext Black x20 Black x20 Black x30 Black x30 gtb 2x10 gtb 2x10 gtb 2x15 [employer] slide  20 [employer] slide  30 [employer] slide  30   Sidelying ER 2# x20 1# x16  2#x4 1# x20 1# x20 nv nv  2 x 10 3x10 3x10   TB no papa Pink x20 orange x15     rtb 1x10      Prone I, T   1# x20 ea 2# x20 ea         Ball on wall - vert, horiz, CW, CCW   x20 ea yellow ball 4# ball  x20 ea                                   Ther Ex    Wand flex      3"x20 D/C       Wand scaption     3"x20 D/C       Wand extension     3"x20 D/C       TB IR/ER ER/IR red 2x15 ea AAROM ER ER/IR red 2x15 ea AAROM ER RTB  ER x20 AAROM RTB  ER x20 AAROM  IR x30 RTB  ER x15  IR x20 RTB  ER x20  IR x20 RTB  ER x20  IR x20 Web slide  IR-green  2 x 12   ER-red  2 x 12 ER/IR red 2x15 ea AAROM ER ER/IR red 2x15 ea AAROM ER   Flex to 90* 2#   x 30 AROM 2#   x 30 AROM 2# x30 AROM 2# x30 AROM 2# x10/8  A/AA 2# 2x10  A/AA 2# x20 AROM 2#   x 20 AROM 2#   x 30 AROM 2#   x 30 AROM   Scap to 90* 2#   x 30 AROM 2#   x 30 AROM 2# x30 AROM 2# x30 AROM 2# x10/8  A/AA 2# 2x10  A/AA 2# x20 AROM 2#  x20  AROM 0#  X 30 AROM 2#   x 20 AROM   Bicep curls    5# x30                                   Ther Activity    Pulleys  5' 5' 5'  5' 5' 5' 5' 5' 5'   UBE    5'                      Gait Training                              Modalities

## 2021-05-06 ENCOUNTER — OFFICE VISIT (OUTPATIENT)
Dept: PHYSICAL THERAPY | Facility: REHABILITATION | Age: 81
End: 2021-05-06
Payer: MEDICARE

## 2021-05-06 DIAGNOSIS — M19.011 OSTEOARTHRITIS OF RIGHT SHOULDER, UNSPECIFIED OSTEOARTHRITIS TYPE: ICD-10-CM

## 2021-05-06 DIAGNOSIS — M19.011 PRIMARY OSTEOARTHRITIS OF RIGHT SHOULDER: Primary | ICD-10-CM

## 2021-05-06 PROCEDURE — 97110 THERAPEUTIC EXERCISES: CPT | Performed by: PHYSICAL THERAPIST

## 2021-05-06 NOTE — PROGRESS NOTES
Daily Note     Today's date: 2021  Patient name: aKley Davidson  : 1940  MRN: [id number]  Referring provider: Tiffanie Sierra DO  Dx:   Encounter Diagnosis     ICD-10-CM    1  Primary osteoarthritis of right shoulder  M19 011    2  Osteoarthritis of right shoulder, unspecified osteoarthritis type  M19 011                   Subjective: Pt reports she notices some soreness and fatigue following exercises in therapy and at home, however, notes she usually feels better then later on  Objective: See treatment diary below      Assessment: Tolerated treatment well  Patient demonstrated fatigue post treatment, exhibited good technique with therapeutic exercises and would benefit from continued PT      Plan: Progress treatment as tolerated         Precautions: HTN    Daily Treatment Diary    Date    FOTO nv    perf perf    nv   Re-Eval perf         nv      Manuals    GHJ post & inf mobs RUBEN Gr IV     RUBEN Gr IV RUBEN Gr IV NC Gr IV RUBEN Gr IV    ACJ mobs      RUBEN Gr IV RUBEN Gr IV NC  Gr IV RUBEN Gr IV    R Shoulder PROM          RUBEN                Neuro Re-Ed     scap retraction Black x20 Black 20x Black x30 Black x30 [place] 11 5# x20 gtb 2x10 gtb 2x10 [employer] slide  20 Blue web slide  30 [employer] [facility] [employer] slide  30   TB shoulder ext Black x20 Black x20 Black x30 Black x30 Black x30 gtb 2x10 gtb 2x15 [employer] slide  20 [employer] [facility] [employer] slide  30 [employer] [facility] [employer] slide  30   Sidelying ER 2# x20 1# x16  2#x4 1# x20 1# x20 1# x20 nv  2 x 10 3x10 3x10   TB no papa Pink x20 orange x15     rtb 1x10      Prone I, T   1# x20 ea 2# x20 ea 2# x30 AROM        Ball on wall - vert, horiz, CW, CCW   x20 ea yellow ball 4# ball  x20 ea 4# ball  x20 ea                                  Ther Ex    Wand flex       D/C       Wand scaption      D/C       Wand extension      D/C       TB IR/ER ER/IR red 2x15 ea AAROM ER ER/IR red 2x15 ea AAROM ER RTB  ER x20 AAROM RTB  ER x20 AAROM  IR x30 RTB  ER x20 AAROM  IR x30 RTB  ER x20  IR x20 RTB  ER x20  IR x20 Web slide  IR-green  2 x 12   ER-red  2 x 12 ER/IR red 2x15 ea AAROM ER ER/IR red 2x15 ea AAROM ER   Flex to 90* 2#   x 30 AROM 2#   x 30 AROM 2# x30 AROM 2# x30 AROM 2# x30 AROM 2# 2x10  A/AA 2# x20 AROM 2#   x 20 AROM 2#   x 30 AROM 2#   x 30 AROM   Scap to 90* 2#   x 30 AROM 2#   x 30 AROM 2# x30 AROM 2# x30 AROM 2# x30 AROM 2# 2x10  A/AA 2# x20 AROM 2#  x20  AROM 0#  X 30 AROM 2#   x 20 AROM   Bicep curls    5# x30 5# x30                                  Ther Activity    Pulleys  5' 5' 5'  5' 5' 5' 5' 5' 5'   UBE    5'                      Gait Training                              Modalities

## 2021-05-11 ENCOUNTER — OFFICE VISIT (OUTPATIENT)
Dept: PHYSICAL THERAPY | Facility: REHABILITATION | Age: 81
End: 2021-05-11
Payer: MEDICARE

## 2021-05-11 DIAGNOSIS — M19.011 OSTEOARTHRITIS OF RIGHT SHOULDER, UNSPECIFIED OSTEOARTHRITIS TYPE: ICD-10-CM

## 2021-05-11 DIAGNOSIS — M19.011 PRIMARY OSTEOARTHRITIS OF RIGHT SHOULDER: Primary | ICD-10-CM

## 2021-05-11 PROCEDURE — 97110 THERAPEUTIC EXERCISES: CPT

## 2021-05-11 NOTE — PROGRESS NOTES
Daily Note     Today's date: 2021  Patient name: Ailyn Sahu  : 1940  MRN: [id number]  Referring provider: Eulalia Green DO  Dx:   Encounter Diagnosis     ICD-10-CM    1  Primary osteoarthritis of right shoulder  M19 011    2  Osteoarthritis of right shoulder, unspecified osteoarthritis type  M19 011                   Subjective: Pt reports that she noticed her strength is getting better able to move a cast iron skillet from one burner to another with no difficulty using her involved arm  Objective: See treatment diary below      Assessment: Tolerated treatment well  She continues to demonstrate most weakness into shoulder standing ER  Otherwise, pt is doing well making good progress, challenged with current program  Patient demonstrated fatigue post treatment, exhibited good technique with therapeutic exercises and would benefit from continued PT      Plan: Progress treatment as tolerated         Precautions: HTN    Daily Treatment Diary    Date    FOTO nv    perf     nv   Re-Eval perf         nv      Manuals    GHJ post & inf mobs RUBEN Gr IV        RUBEN Gr IV    ACJ mobs         RUBEN Gr IV    R Shoulder PROM          RUBEN                Neuro Re-Ed     scap retraction Black x20 Black 20x Black x30 Black x30 [place] 11 5# x20 [place] 11 5# x20   [employer] web slide  30 [employer] slide  30   TB shoulder ext Black x20 Black x20 Black x30 Black x30 Black x30 Black x30   Blue web slide  30 Blue web slide  30   Sidelying ER 2# x20 1# x16  2#x4 1# x20 1# x20 1# x20 1#x20   3x10 3x10   TB no papa Pink x20 orange x15           Prone I, T   1# x20 ea 2# x20 ea 2# x30 AROM 2# x30 AROM       Ball on wall - vert, horiz, CW, CCW   x20 ea yellow ball 4# ball  x20 ea 4# ball  x20 ea 4# ball x20 ea                                 Ther Ex    Wand flex              Wand scaption             Wand extension             TB IR/ER ER/IR red 2x15 ea AAROM ER ER/IR red 2x15 ea AAROM ER RTB  ER x20 AAROM RTB  ER x20 AAROM  IR x30 RTB  ER x20 AAROM  IR x30 RTB  IR x30 AAROM  ER x20   ER/IR red 2x15 ea AAROM ER ER/IR red 2x15 ea AAROM ER   Flex to 90* 2#   x 30 AROM 2#   x 30 AROM 2# x30 AROM 2# x30 AROM 2# x30 AROM 2# x30 AROM   2#   x 30 AROM 2#   x 30 AROM   Scap to 90* 2#   x 30 AROM 2#   x 30 AROM 2# x30 AROM 2# x30 AROM 2# x30 AROM 2# x30 AROM   0#  X 30 AROM 2#   x 20 AROM   Bicep curls    5# x30 5# x30 5# x30                                 Ther Activity    Pulleys  5' 5' 5'  5' 5'   5' 5'   UBE    5'                      Gait Training                              Modalities

## 2021-05-13 ENCOUNTER — OFFICE VISIT (OUTPATIENT)
Dept: PHYSICAL THERAPY | Facility: REHABILITATION | Age: 81
End: 2021-05-13
Payer: MEDICARE

## 2021-05-13 DIAGNOSIS — M19.011 PRIMARY OSTEOARTHRITIS OF RIGHT SHOULDER: Primary | ICD-10-CM

## 2021-05-13 DIAGNOSIS — M19.011 OSTEOARTHRITIS OF RIGHT SHOULDER, UNSPECIFIED OSTEOARTHRITIS TYPE: ICD-10-CM

## 2021-05-13 PROCEDURE — 97110 THERAPEUTIC EXERCISES: CPT | Performed by: PHYSICAL THERAPIST

## 2021-05-13 PROCEDURE — 97112 NEUROMUSCULAR REEDUCATION: CPT | Performed by: PHYSICAL THERAPIST

## 2021-05-13 NOTE — PROGRESS NOTES
Daily Note     Today's date: 2021  Patient name: Deon Regalado  : 1940  MRN: [id number]  Referring provider: Johanne Donovan DO  Dx:   Encounter Diagnosis     ICD-10-CM    1  Primary osteoarthritis of right shoulder  M19 011    2  Osteoarthritis of right shoulder, unspecified osteoarthritis type  M19 011                   Subjective: Pt reports she was able to move her 12" skillet on her stovetop with greater ease recently  Reports soreness in shoulder today, attributed to performing a lot of yard work  Also, states she performed TB external rotation this morning  Objective: See treatment diary below      Assessment: Tolerated treatment well  Demonstrated significant fatigue and difficulty with performing ER exercises (TB and sidelying)  Patient demonstrated fatigue post treatment and would benefit from continued PT      Plan: Progress treatment as tolerated  Plan to discharge end of next week to [facility]       Precautions: HTN    Daily Treatment Diary    Date    FOTO nv    perf     nv   Re-Eval perf         nv      Manuals    GHJ post & inf mobs RUBEN Gr IV        RUBEN Gr IV    ACJ mobs         RUBEN Gr IV    R Shoulder PROM          RUBEN                Neuro Re-Ed     scap retraction Black x20 Black 20x Black x30 Black x30 [place] 11 5# x20 [place] 11 5# x20 [place]  12# x30  Blue web slide  30 [employer] slide  30   TB shoulder ext Black x20 Black x20 Black x30 Black x30 Black x30 Black x30 [place]  12# x30  [facility] [place] web slide  30 Blue web slide  30   Sidelying ER 2# x20 1# x16  2#x4 1# x20 1# x20 1# x20 1#x20 0# x20  3x10 3x10   TB no papa Pink x20 orange x15           Prone I, T   1# x20 ea 2# x20 ea 2# x30 AROM 2# x30 AROM 3# x30        Ball on wall - vert, horiz, CW, CCW   x20 ea yellow ball 4# ball  x20 ea 4# ball  x20 ea 4# ball x20 ea 4# ball x20 ea                                Ther Ex    Wand flex              Wand scaption             Wand extension             TB IR/ER ER/IR red 2x15 ea AAROM ER ER/IR red 2x15 ea AAROM ER RTB  ER x20 AAROM RTB  ER x20 AAROM  IR x30 RTB  ER x20 AAROM  IR x30 RTB  IR x30 AAROM  ER x20 RTB IR x30  ER - unable  ER/IR red 2x15 ea AAROM ER ER/IR red 2x15 ea AAROM ER   Flex to 90* 2#   x 30 AROM 2#   x 30 AROM 2# x30 AROM 2# x30 AROM 2# x30 AROM 2# x30 AROM 2# x30 AROM  2#   x 30 AROM 2#   x 30 AROM   Scap to 90* 2#   x 30 AROM 2#   x 30 AROM 2# x30 AROM 2# x30 AROM 2# x30 AROM 2# x30 AROM 2# x30 AROM  0#  X 30 AROM 2#   x 20 AROM   Bicep curls    5# x30 5# x30 5# x30 5# x30                                Ther Activity    Pulleys  5' 5' 5'  5' 5' 5'  5' 5'   UBE    5'                      Gait Training                              Modalities

## 2021-05-18 ENCOUNTER — OFFICE VISIT (OUTPATIENT)
Dept: PHYSICAL THERAPY | Facility: REHABILITATION | Age: 81
End: 2021-05-18
Payer: MEDICARE

## 2021-05-18 DIAGNOSIS — M19.011 OSTEOARTHRITIS OF RIGHT SHOULDER, UNSPECIFIED OSTEOARTHRITIS TYPE: ICD-10-CM

## 2021-05-18 DIAGNOSIS — M19.011 PRIMARY OSTEOARTHRITIS OF RIGHT SHOULDER: Primary | ICD-10-CM

## 2021-05-18 PROCEDURE — 97112 NEUROMUSCULAR REEDUCATION: CPT | Performed by: PHYSICAL THERAPIST

## 2021-05-18 PROCEDURE — 97110 THERAPEUTIC EXERCISES: CPT | Performed by: PHYSICAL THERAPIST

## 2021-05-18 NOTE — PROGRESS NOTES
Daily Note     Today's date: 2021  Patient name: Murray Carrel  : 1940  MRN: [id number]  Referring provider: Harlan Hector DO  Dx:   Encounter Diagnosis     ICD-10-CM    1  Primary osteoarthritis of right shoulder  M19 011    2  Osteoarthritis of right shoulder, unspecified osteoarthritis type  M19 011                   Subjective: Pt comes to therapy denying notable changes since last session  States she was washing her car windows recently which she felt some discomfort in shoulder following, however, comes to therapy denying discomfort today  Objective: See treatment diary below      Assessment: Tolerated treatment well  Patient demonstrated fatigue post treatment, exhibited good technique with therapeutic exercises and would benefit from continued PT      Plan: Progress treatment as tolerated         Precautions: HTN    Daily Treatment Diary    Date    FOTO nv    perf     nv   Re-Eval perf         nv      Manuals    GHJ post & inf mobs RUBEN Gr IV        RUBEN Gr IV    ACJ mobs         RUBEN Gr IV    R Shoulder PROM          RUBEN                Neuro Re-Ed     scap retraction Black x20 Black 20x Black x30 Black x30 Rob[place] 11 5# x20 [place] 11 5# x20 [place]  12# x30 [place] 14 1# x20 [employer] slide  30 [employer] slide  30   TB shoulder ext Black x20 Black x20 Black x30 Black x30 Black x30 Black x30 [place]  12# x30 Rob[place] 14 1# x20 FOODit web slide  30 Blue web slide  30   Sidelying ER 2# x20 1# x16  2#x4 1# x20 1# x20 1# x20 1#x20 0# x20 2# x30 AAROM 3x10 3x10   TB no papa Pink x20 orange x15           Prone I, T   1# x20 ea 2# x20 ea 2# x30 AROM 2# x30 AROM 3# x30   3# x30     Ball on wall - vert, horiz, CW, CCW   x20 ea yellow ball 4# ball  x20 ea 4# ball  x20 ea 4# ball x20 ea 4# ball x20 ea 4# ball x20 ea                               Ther Ex    Wand flex              Wand scaption             Wand extension             TB IR/ER ER/IR red 2x15 ea AAROM ER ER/IR red 2x15 ea AAROM ER RTB  ER x20 AAROM RTB  ER x20 AAROM  IR x30 RTB  ER x20 AAROM  IR x30 RTB  IR x30 AAROM  ER x20 RTB IR x30  ER - unable blue   IR x20  RTB ER x20 ER/IR red 2x15 ea AAROM ER ER/IR red 2x15 ea AAROM ER   Flex to 90* 2#   x 30 AROM 2#   x 30 AROM 2# x30 AROM 2# x30 AROM 2# x30 AROM 2# x30 AROM 2# x30 AROM 2# x30 AROM 2#   x 30 AROM 2#   x 30 AROM   Scap to 90* 2#   x 30 AROM 2#   x 30 AROM 2# x30 AROM 2# x30 AROM 2# x30 AROM 2# x30 AROM 2# x30 AROM 2# x30 AROM 0#  X 30 AROM 2#   x 20 AROM   Bicep curls    5# x30 5# x30 5# x30 5# x30 5# x30                               Ther Activity    Pulleys  5' 5' 5'  5' 5' 5' 5' 5' 5'   UBE    5'                      Gait Training                              Modalities

## 2021-05-20 ENCOUNTER — OFFICE VISIT (OUTPATIENT)
Dept: PHYSICAL THERAPY | Facility: REHABILITATION | Age: 81
End: 2021-05-20
Payer: MEDICARE

## 2021-05-20 DIAGNOSIS — M19.011 PRIMARY OSTEOARTHRITIS OF RIGHT SHOULDER: Primary | ICD-10-CM

## 2021-05-20 DIAGNOSIS — M19.011 OSTEOARTHRITIS OF RIGHT SHOULDER, UNSPECIFIED OSTEOARTHRITIS TYPE: ICD-10-CM

## 2021-05-20 PROCEDURE — 97110 THERAPEUTIC EXERCISES: CPT | Performed by: PHYSICAL THERAPIST

## 2021-05-20 PROCEDURE — 97112 NEUROMUSCULAR REEDUCATION: CPT | Performed by: PHYSICAL THERAPIST

## 2021-05-20 NOTE — PROGRESS NOTES
PT Re-Evaluation  and PT Discharge    Today's date: 2021  Patient name: Joelle Jeff  : 1940  MRN: [id number]  Referring provider: Philly Sheets DO  Dx:   Encounter Diagnosis     ICD-10-CM    1  Primary osteoarthritis of right shoulder  M19 011    2  Osteoarthritis of right shoulder, unspecified osteoarthritis type  M19 011                   Assessment  Assessment details: Joelle Jeff has been treated in outpatient physical therapy over the past 4 weeks for diagnosis/complaints of Primary osteoarthritis of right shoulder  (primary encounter diagnosis)  Osteoarthritis of right shoulder, unspecified osteoarthritis type  Pt demonstrates increased range of motion, improved strength, decreased pain, and increased activity tolerance  Pt continues to present with decreased shoulder strength, particularly in the rotator cuff and into external rotation, and decreased tolerance to activity  Pt would continue to benefit from strengthening program to address remaining weakness, however, will plan to continue via HEP at present time, until she follows up with orthopedic physician  Impairments: abnormal coordination, abnormal or restricted ROM, activity intolerance, impaired physical strength and pain with function  Understanding of Dx/Px/POC: good   Prognosis: good    Goals  ST  Patient will report 25% decrease in pain in 4 weeks  - MET  2  Patient will be able to reach to shoulder height shelf without difficulty in 4 weeks  - MET  3  Patient will demonstrate 1/2 grade improvement in strength in 4 weeks  - MET    LT  Patient will be able to perform IADLS without restriction or pain by discharge  - MET  2  Patient will be independent in HEP by discharge  - MET  3  Patient will be able to return to recreational/work duties without restriction or pain by discharge   - PARTIALLY MET      Plan  Patient would benefit from: PT eval and skilled PT  Planned modality interventions: cryotherapy and thermotherapy: hydrocollator packs  Planned therapy interventions: IADL retraining, body mechanics training, flexibility, functional ROM exercises, home exercise program, neuromuscular re-education, manual therapy, postural training, strengthening, stretching, therapeutic activities, therapeutic exercise and joint mobilization  Frequency: 2x week  Duration in visits: 1  Duration in weeks: 4  Treatment plan discussed with: patient        Subjective Evaluation    History of Present Illness  Mechanism of injury: 21  Pt reports onset of RUE pain starting approx 9 months ago, denying injury or trauma  Reports she consulted PCP and orthopedic physician  Reports she has had radiographs on R shoulder  Notes she had injection in orthopedic physician's office, which has helped her with reaching overhead  AGGS: lifting weighted items, reaching forward while holding an item  LOC: lateral upper R arm  EASES: avoiding agg motions     21  Pt reports she continues to notice improvements in her strength and activity tolerance  States she continues to have soreness in shoulder, particularly when she is active or doing a lot of things around the house or yard  Notes improved ability to lift items, such as cast iron pan  Follows up with orthopedic physician 21    Pain  Current pain ratin  At worst pain ratin    Patient Goals  Patient goals for therapy: decreased pain, increased motion and independence with ADLs/IADLs          Objective     Active Range of Motion   Left Shoulder   External rotation BTH: T4   Internal rotation BTB: T4     Right Shoulder   Flexion: 140 degrees   Abduction: 160 degrees   External rotation BTH: T4   Internal rotation BTB: T7     Passive Range of Motion     Right Shoulder   Flexion: WFL  Abduction: WFL  External rotation 90°: 60 degrees   Internal rotation 90°: 50 degrees     Strength/Myotome Testing     Left Shoulder     Planes of Motion   Flexion: 4+   Abduction: 4+ External rotation at 0°: 4+   Internal rotation at 0°: 4+     Isolated Muscles   Biceps: 4+   Triceps: 4+     Right Shoulder     Planes of Motion   Flexion: 4+   Abduction: 4+   External rotation at 0°: 4-   Internal rotation at 0°: 4+     Isolated Muscles   Biceps: 4+   Triceps: 4+              Precautions: HTN    Daily Treatment Diary    Date 4/22 4/26 4/29 5/4 5/6 5/11 5/13 5/18 5/20 4/19   FOTO nv    perf     nv   Re-Eval perf        perf nv      Manuals    GHJ post & inf mobs RUBEN Gr IV            ACJ mobs             R Shoulder PROM          RUBEN                Neuro Re-Ed     scap retraction Black x20 Black 20x Black x30 Black x30 [place] 11 5# x20 [place] 11 5# x20 [place]  12# x30 [place] 14 1# x20 Rob[place] 15 1# x30 Blue web slide  30   TB shoulder ext Black x20 Black x20 Black x30 Black x30 Black x30 Black x30 [place]  12# x30 [place] 14 1# x20 [place] 15 1# x30 Blue web slide  30   Sidelying ER 2# x20 1# x16  2#x4 1# x20 1# x20 1# x20 1#x20 0# x20 2# x30 AAROM 3# x20 AAROM 3x10   TB no papa Pink x20 orange x15           Prone I, T   1# x20 ea 2# x20 ea 2# x30 AROM 2# x30 AROM 3# x30   3# x30 3# x30    Ball on wall - vert, horiz, CW, CCW   x20 ea yellow ball 4# ball  x20 ea 4# ball  x20 ea 4# ball x20 ea 4# ball x20 ea 4# ball x20 ea 4# ball x20 ea                              Ther Ex    Wand flex              Wand scaption             Wand extension             TB IR/ER ER/IR red 2x15 ea AAROM ER ER/IR red 2x15 ea AAROM ER RTB  ER x20 AAROM RTB  ER x20 AAROM  IR x30 RTB  ER x20 AAROM  IR x30 RTB  IR x30 AAROM  ER x20 RTB IR x30  ER - unable blue   IR x20  RTB ER x20 blue   IR x30  RTB ER x30 ER/IR red 2x15 ea AAROM ER   Flex to 90* 2#   x 30 AROM 2#   x 30 AROM 2# x30 AROM 2# x30 AROM 2# x30 AROM 2# x30 AROM 2# x30 AROM 2# x30 AROM 2# x30 AROM 2#   x 30 AROM   Scap to 90* 2#   x 30 AROM 2#   x 30 AROM 2# x30 AROM 2# x30 AROM 2# x30 AROM 2# x30 AROM 2# x30 AROM 2# x30 AROM 2# x30 AROM 2#   x 20 AROM   Bicep curls 5# x30 5# x30 5# x30 5# x30 5# x30 5# x30                              Ther Activity    Pulleys  5' 5' 5'  5' 5' 5' 5' 5' 5'   UBE    5'                      Gait Training                              Modalities

## 2021-06-17 ENCOUNTER — OFFICE VISIT (OUTPATIENT)
Dept: OBGYN CLINIC | Facility: MEDICAL CENTER | Age: 81
End: 2021-06-17
Payer: MEDICARE

## 2021-06-17 ENCOUNTER — TELEPHONE (OUTPATIENT)
Dept: FAMILY MEDICINE CLINIC | Facility: CLINIC | Age: 81
End: 2021-06-17

## 2021-06-17 VITALS
WEIGHT: 178 LBS | HEART RATE: 94 BPM | BODY MASS INDEX: 28.61 KG/M2 | SYSTOLIC BLOOD PRESSURE: 171 MMHG | HEIGHT: 66 IN | DIASTOLIC BLOOD PRESSURE: 92 MMHG

## 2021-06-17 DIAGNOSIS — M70.61 GREATER TROCHANTERIC BURSITIS OF RIGHT HIP: Primary | ICD-10-CM

## 2021-06-17 DIAGNOSIS — M76.31 IT BAND SYNDROME, RIGHT: ICD-10-CM

## 2021-06-17 DIAGNOSIS — M72.2 PLANTAR FASCIITIS OF LEFT FOOT: ICD-10-CM

## 2021-06-17 PROCEDURE — 99213 OFFICE O/P EST LOW 20 MIN: CPT | Performed by: ORTHOPAEDIC SURGERY

## 2021-06-17 NOTE — PROGRESS NOTES
Ortho Sports Medicine Shoulder Follow Up Visit     Assesment:      79 y o  female right shoulder moderate glenohumeral OA, resolved       Right hip greater trochanteric bursitis with IT band tendonitis       Plan:    Conservative treatment:    Ice to shoulder 1-2 times daily, for 20 minutes at a time  PT for ROM and strengthening to shoulder, rotator cuff, scapular stabilizers  Let pain guide return to activities  Imaging: All imaging from today was reviewed by myself and explained to the patient  Injection:    No Injection planned at this time  Surgery:     No surgery is recommended at this point, continue with conservative treatment plan as noted  Follow up:    No follow-ups on file  Chief Complaint   Patient presents with    Right Shoulder - Follow-up         History of Present Illness: The patient is returns for follow up of right shoulder OA  Since the prior visit, She reports significant improvement  She notes the conservative therapy on her right shoulder has improved that the point that she is happy  She notes that she has fat pad atrophy in the left foot and it has altered her gait causing right hip and thigh pain  She notes pain over the right lateral gluteal region radiating down to the right lateral knee at times  She has had physical therapy for this in the past       Pain is 4/10  Improved with rest   Worse with activity  Shoulder Surgical History:  None    Past Medical, Social and Family History:  Past Medical History:   Diagnosis Date    Hypertension      Past Surgical History:   Procedure Laterality Date    TUBAL LIGATION Bilateral      Allergies   Allergen Reactions    Lansoprazole Diarrhea    Naproxen     Nsaids      Other reaction(s): renal dysfunction  Category: Adverse Reaction;     Sulfa Antibiotics Hives    Trimethoprim     Amoxicillin Fever and Rash     Category:  Allergy;      Current Outpatient Medications on File Prior to Visit Medication Sig Dispense Refill    ASPERCREME LIDOCAINE EX Apply topically      aspirin (Aspirin 81) 81 mg EC tablet       bimatoprost (LUMIGAN) 0 01 % ophthalmic drops Apply 1 drop to eye Daily      brimonidine (ALPHAGAN P) 0 1 % Apply 1 drop to eye every 12 (twelve) hours      Calcium Carbonate-Vitamin D (CALTRATE 600+D PO) Take 2 tablets by mouth daily       cholecalciferol (VITAMIN D3) 1,000 units tablet Take 1,000 Units by mouth daily      diltiazem (CARDIZEM LA) 240 MG 24 hr tablet Take 1 tablet (240 mg total) by mouth daily 90 tablet 1    ezetimibe (ZETIA) 10 mg tablet Take 1 tablet (10 mg total) by mouth daily 90 tablet 3    Multiple Vitamins-Minerals (CENTRUM SILVER ULTRA WOMENS PO) Take 1 tablet by mouth daily      olmesartan (Benicar) 20 mg tablet Take 1 tablet (20 mg total) by mouth daily 90 tablet 3    rosuvastatin (CRESTOR) 10 MG tablet Take 1 tablet (10 mg total) by mouth daily 90 tablet 3     No current facility-administered medications on file prior to visit       Social History     Socioeconomic History    Marital status:      Spouse name: Not on file    Number of children: Not on file    Years of education: Not on file    Highest education level: Not on file   Occupational History    Not on file   Tobacco Use    Smoking status: Former Smoker     Types: Cigarettes    Smokeless tobacco: Former User    Tobacco comment: quit smoking oct /20 /2003   Vaping Use    Vaping Use: Never used   Substance and Sexual Activity    Alcohol use: Yes     Comment: ocassionally    Drug use: No    Sexual activity: Not Currently   Other Topics Concern    Not on file   Social History Narrative    Not on file     Social Determinants of Health     Financial Resource Strain:     Difficulty of Paying Living Expenses:    Food Insecurity:     Worried About [address] in the Last Year:     [address] [facility] [address] in the Last Year:    Transportation Needs:     Lack of Transportation (Medical):  Lack of Transportation (Non-Medical):    Physical Activity:     Days of Exercise per Week:     Minutes of Exercise per Session:    Stress:     Feeling of Stress :    Social Connections:     Frequency of Communication with Friends and Family:     Frequency of Social Gatherings with Friends and Family:     Attends [employer] Services:     Active Member of Clubs or Organizations:     Attends Club or Organization Meetings:     Marital Status:    Intimate Partner Violence:     Fear of Current or Ex-Partner:     Emotionally Abused:     Physically Abused:     Sexually Abused:        I have reviewed the past medical, surgical, social and family history, medications and allergies as documented in the EMR  Review of systems: ROS is negative other than that noted in the HPI  Constitutional: Negative for fatigue and fever  Physical Exam:    There were no vitals taken for this visit      General/Constitutional: NAD, well developed, well nourished  HENT: Normocephalic, atraumatic  CV: Intact distal pulses, regular rate  Resp: No respiratory distress or labored breathing  Lymphatic: No lymphadenopathy palpated  Neuro: Alert and Oriented x 3, no focal deficits  Psych: Normal mood, normal affect, normal judgement, normal behavior  Skin: Warm, dry, no rashes, no erythema      Shoulder focused exam:       RIGHT LEFT    Scapula Atrophy Negative Negative     Winging Negative Negative     Protraction Negative Negative    Rotator cuff SS 5/5 5/5     IS 5/5 5/5     SubS 5/5 5/5    ROM     170     ER0 60 60                   IRb T6    T6    TTP: AC Negative Negative     Biceps Negative Negative     Coracoid Negative Negative    Special Tests: O'Briens Negative Negative     Cordero-shear Negative Negative     Cross body Adduction Negative Negative     Speeds  Negative Negative     Micha's Negative Negative     Whipple Negative Negative       Neer Negative Negative     Massey Negative Negative Instability: Apprehension & relocation not tested not tested     Load & shift not tested not tested    Other: Crank Negative Negative             Right Lower Extremity Exam  Alignment:  Normal resting hip posture  Normal knee alignment  Inspection:  No erythema  No ecchymosis  No muscle atrophy  Palpation:  TTP of the greater troch and lateral femoral epicondyle, no knee effusion  ROM:  Normal hip ROM  Normal knee ROM  Strength:  5/5 hip flexors and abductors  5/5 quadriceps and hamstrings  Stability:  No objective LE joint instablity  Neurovascular:  Sensation intact in DP/SP/Cervantes/Sa/T nerve distributions  Toes warm and perfused  Gait:  Smooth        UE NV Exam: +2 Radial pulses bilaterally  Sensation intact to light touch C5-T1 bilaterally, Radial/median/ulnar nerve motor intact    Cervical ROM is full without pain, numbness or tingling      Shoulder Imaging    No imaging was performed today

## 2021-06-17 NOTE — TELEPHONE ENCOUNTER
Patient called asking if it would be ok with her using the diclofenac cream since she has kidney function problems  Please advise  She asked Dr Bouchra Jonas and he advised to call the primary doctor

## 2021-07-01 ENCOUNTER — OFFICE VISIT (OUTPATIENT)
Dept: PODIATRY | Facility: CLINIC | Age: 81
End: 2021-07-01
Payer: MEDICARE

## 2021-07-01 VITALS
WEIGHT: 177.2 LBS | BODY MASS INDEX: 28.48 KG/M2 | HEART RATE: 82 BPM | SYSTOLIC BLOOD PRESSURE: 138 MMHG | DIASTOLIC BLOOD PRESSURE: 77 MMHG | HEIGHT: 66 IN

## 2021-07-01 DIAGNOSIS — M79.672 LEFT FOOT PAIN: Primary | ICD-10-CM

## 2021-07-01 DIAGNOSIS — M72.2 PLANTAR FASCIITIS OF LEFT FOOT: ICD-10-CM

## 2021-07-01 PROCEDURE — 99202 OFFICE O/P NEW SF 15 MIN: CPT

## 2021-07-01 PROCEDURE — 20550 NJX 1 TENDON SHEATH/LIGAMENT: CPT

## 2021-07-01 RX ORDER — TRIAMCINOLONE ACETONIDE 40 MG/ML
20 INJECTION, SUSPENSION INTRA-ARTICULAR; INTRAMUSCULAR ONCE
Status: COMPLETED | OUTPATIENT
Start: 2021-07-01 | End: 2021-07-01

## 2021-07-01 RX ORDER — LIDOCAINE HYDROCHLORIDE 10 MG/ML
1 INJECTION, SOLUTION EPIDURAL; INFILTRATION; INTRACAUDAL; PERINEURAL ONCE
Status: COMPLETED | OUTPATIENT
Start: 2021-07-01 | End: 2021-07-01

## 2021-07-01 RX ORDER — BUPIVACAINE HYDROCHLORIDE 5 MG/ML
1 INJECTION, SOLUTION EPIDURAL; INTRACAUDAL ONCE
Status: COMPLETED | OUTPATIENT
Start: 2021-07-01 | End: 2021-07-01

## 2021-07-01 RX ADMIN — TRIAMCINOLONE ACETONIDE 20 MG: 40 INJECTION, SUSPENSION INTRA-ARTICULAR; INTRAMUSCULAR at 15:09

## 2021-07-01 RX ADMIN — BUPIVACAINE HYDROCHLORIDE 1 ML: 5 INJECTION, SOLUTION EPIDURAL; INTRACAUDAL at 15:07

## 2021-07-01 RX ADMIN — LIDOCAINE HYDROCHLORIDE 1 ML: 10 INJECTION, SOLUTION EPIDURAL; INFILTRATION; INTRACAUDAL; PERINEURAL at 15:08

## 2021-07-01 NOTE — PROGRESS NOTES
Assessment/Plan:      Explained to patient that her symptoms are most consistent with plantar fasciitis of the left heel  Advised her to continue to wear supports and to stretch as much as possible  Injected left heel with 0 5 cc Kenalog 40 along with 1 cc 1% xylocaine and 1 cc 0 5% Marcaine  The series of injections may be needed  Patient will be  Rescheduled in 3 weeks  No problem-specific Assessment & Plan notes found for this encounter  Diagnoses and all orders for this visit:    Left foot pain    Plantar fasciitis of left foot  -     Ambulatory referral to Podiatry  -     lidocaine (PF) (XYLOCAINE-MPF) 1 % injection 1 mL  -     bupivacaine (PF) (MARCAINE) 0 5 % injection 1 mL  -     triamcinolone acetonide (KENALOG-40) 40 mg/mL injection 20 mg          Subjective:      Patient ID: Polo Cuello is a 79 y o  female  HPI       Patient, a 78-year-old female presents with left heel pain  Patient has dealt with left heel pain off and on for the past 6 or 7 months  She had been under the care of Dr Mariana Fleming  She had been given cortisone injections 6 months ago as well as undergone months of physical therapy  She currently wears arch supports  Approximately 3 weeks ago she had a flare of plantar fasciitis  The pain is present along the medial heel  The following portions of the patient's history were reviewed and updated as appropriate: allergies, current medications, past family history, past medical history, past social history, past surgical history and problem list     Review of Systems   HENT: Positive for hearing loss  Musculoskeletal: Positive for arthralgias  Neurological: Negative  Hematological: Bruises/bleeds easily  Objective:      /77   Pulse 82   Ht 5' 6" (1 676 m)   Wt 80 4 kg (177 lb 3 2 oz)   BMI 28 60 kg/m²          Physical Exam  Constitutional:       Appearance: Normal appearance  Cardiovascular:      Pulses: Normal pulses  Musculoskeletal:         General: Tenderness present  Comments: Pain with palpation medial aspect left heel at fascia insertion into the calcaneus  Skin:     General: Skin is warm  Neurological:      General: No focal deficit present  Mental Status: She is oriented to person, place, and time  Foot injection     Date/Time 7/1/2021 3:10 PM     Performed by  Violet Donald DPM     Authorized by Violet Donald DPM      Universal Protocol   Consent: Verbal consent obtained  Risks and benefits: risks, benefits and alternatives were discussed  Consent given by: patient  Patient understanding: patient states understanding of the procedure being performed  Patient identity confirmed: verbally with patient        Local anesthesia used: yes     Anesthesia   Local anesthesia used: yes  Local Anesthetic: lidocaine 1% without epinephrine and bupivacaine 0 5% without epinephrine     Procedure Details   Procedure Notes:   Injected left heel with 0 5 cc Kenalog 40 along with 1 cc 1% xylocaine and 1 cc 0 5% Marcaine

## 2021-07-15 ENCOUNTER — TELEPHONE (OUTPATIENT)
Dept: FAMILY MEDICINE CLINIC | Facility: CLINIC | Age: 81
End: 2021-07-15

## 2021-07-15 ENCOUNTER — OFFICE VISIT (OUTPATIENT)
Dept: FAMILY MEDICINE CLINIC | Facility: CLINIC | Age: 81
End: 2021-07-15
Payer: MEDICARE

## 2021-07-15 VITALS
TEMPERATURE: 97.5 F | HEART RATE: 97 BPM | DIASTOLIC BLOOD PRESSURE: 60 MMHG | SYSTOLIC BLOOD PRESSURE: 128 MMHG | HEIGHT: 66 IN | BODY MASS INDEX: 27.77 KG/M2 | OXYGEN SATURATION: 97 % | WEIGHT: 172.8 LBS

## 2021-07-15 DIAGNOSIS — E11.69 TYPE 2 DIABETES MELLITUS WITH OTHER SPECIFIED COMPLICATION, WITHOUT LONG-TERM CURRENT USE OF INSULIN (HCC): ICD-10-CM

## 2021-07-15 DIAGNOSIS — R79.89 LOW VITAMIN D LEVEL: ICD-10-CM

## 2021-07-15 DIAGNOSIS — G89.29 CHRONIC RIGHT SHOULDER PAIN: ICD-10-CM

## 2021-07-15 DIAGNOSIS — N18.31 CHRONIC RENAL IMPAIRMENT, STAGE 3A (HCC): ICD-10-CM

## 2021-07-15 DIAGNOSIS — M25.511 CHRONIC RIGHT SHOULDER PAIN: ICD-10-CM

## 2021-07-15 DIAGNOSIS — E83.41 HIGH MAGNESIUM LEVELS: Primary | ICD-10-CM

## 2021-07-15 DIAGNOSIS — Z12.31 ENCOUNTER FOR SCREENING MAMMOGRAM FOR MALIGNANT NEOPLASM OF BREAST: ICD-10-CM

## 2021-07-15 DIAGNOSIS — I73.9 PERIPHERAL VASCULAR DISEASE (HCC): ICD-10-CM

## 2021-07-15 DIAGNOSIS — I10 ESSENTIAL HYPERTENSION: ICD-10-CM

## 2021-07-15 DIAGNOSIS — R31.21 ASYMPTOMATIC MICROSCOPIC HEMATURIA: ICD-10-CM

## 2021-07-15 PROCEDURE — 99214 OFFICE O/P EST MOD 30 MIN: CPT | Performed by: FAMILY MEDICINE

## 2021-07-15 NOTE — TELEPHONE ENCOUNTER
I called [facility] for sight and had to leave a message to receive her last eye exam

## 2021-07-15 NOTE — PROGRESS NOTES
Assessment/Plan:       No problem-specific Assessment & Plan notes found for this encounter  Diagnoses and all orders for this visit:    High magnesium levels  Comments:  Improved to follow with low magnesium diet    Type 2 diabetes mellitus with other specified complication, without long-term current use of insulin ([facility] )  Comments: Well controlled to follow 100 calorie    Essential hypertension  Comments:  Controlled  To follow with the dash diet    Low vitamin D level  Comments:  compensated    Peripheral vascular disease ([facility] )  Comments:  Patient is asymptomatic  Will check arterial Doppler of the lower extremity March 2021    Chronic renal impairment, stage 3a ([facility] )  Comments:  Stable  Avoid NSAID  Hydrate self well  Recommend referral to Nephrology  Patient would like to hold on it for now    Chronic right shoulder pain  Comments:  Doing well  Following with orthopedic    Asymptomatic microscopic hematuria  Comments: Follow with Urology    Encounter for screening mammogram for malignant neoplasm of breast  -     Mammo screening bilateral w 3d & cad; Future        Patient Instructions   Follow up with test results      Orders Placed This Encounter   Procedures    Mammo screening bilateral w 3d & cad     Standing Status:   Future     Standing Expiration Date:   7/15/2025     Scheduling Instructions:      Do not wear any perfume, powder, lotion or deodorant on the breast or underarm area  If you have had any prior breast studies done at a different facility than [facility], please bring a copy of the study with you on the day of your test  Please allow at least 30 minutes for this appointment  Please bring your insurance cards, a form of photo ID and a list of your medications with you  To schedule this appointment, please contact Central Scheduling at [phone]       Order Specific Question:   Approval for Diagnostic Call Back/Ultrasound If Necessary     Answer:   Yes     Order Specific Question:   Approval for Coordination for Additional Testing     Answer:   Yes         Subjective:     Patient ID: Esme Ugarte is a 79 y o  female      HPI  HTN   Admit to regular salt intake  Denied headache or dizziness  Blood pressure at home 106/88 today  High mag, denied fatigue or muscle spasm  CRI , denied  Microscopic hematuria  Denied hematuria, dysuria or urinary frequency  She is following with Urology   DM   she doesn't check BS at home  She said she does not like to check blood sugar at home  Denied polyuria or polydipsia denied sign of symptom of hypocalcemia and she does watch her sweet and carbohydrate intake  Patient does see Ophthalmology because she does have a glaucoma  Shoulder pain  ,doing well with PT , following with ortho  Peripheral vascular disease  Denied change in the color of her feet  Denied feeling cold feet denied claudication    Test results    labs done on 7-9-21  Discussed result with patient    Review of Systems   Constitutional: Negative for activity change, appetite change, chills, fatigue, fever and unexpected weight change  HENT: Negative for congestion, ear discharge, ear pain, hearing loss, nosebleeds, rhinorrhea, sinus pressure, sore throat, tinnitus, trouble swallowing and voice change  Eyes: Negative for photophobia, pain and visual disturbance  Respiratory: Negative for cough, chest tightness, shortness of breath and wheezing  Cardiovascular: Negative for chest pain, palpitations and leg swelling  Gastrointestinal: Negative for abdominal pain, anal bleeding, blood in stool, constipation, diarrhea, nausea and vomiting  Endocrine: Negative for cold intolerance, heat intolerance, polydipsia and polyuria  Genitourinary: Negative for dysuria, frequency, hematuria and urgency  Musculoskeletal: Negative for back pain, gait problem, joint swelling, myalgias and neck pain  Skin: Negative for rash     Neurological: Negative for dizziness, tremors, seizures, syncope, weakness, light-headedness and headaches  Hematological: Negative for adenopathy  Does not bruise/bleed easily  Psychiatric/Behavioral: Negative for agitation, behavioral problems, confusion, dysphoric mood, hallucinations and sleep disturbance  The patient is not nervous/anxious  Objective:     Physical Exam  Nursing note reviewed  Constitutional:       General: She is not in acute distress  Appearance: Normal appearance  She is well-developed  She is not ill-appearing  HENT:      Head: Normocephalic  Eyes:      General: No scleral icterus  Extraocular Movements: Extraocular movements intact  Conjunctiva/sclera: Conjunctivae normal    Neck:      Thyroid: No thyromegaly  Vascular: No carotid bruit  Cardiovascular:      Rate and Rhythm: Normal rate and regular rhythm  Pulses: Pulses are weak  Dorsalis pedis pulses are 1+ on the right side and 0 on the left side  Posterior tibial pulses are 1+ on the right side and 1+ on the left side  Heart sounds: Normal heart sounds  Pulmonary:      Effort: Pulmonary effort is normal       Breath sounds: Rhonchi present  No wheezing  Abdominal:      General: Bowel sounds are normal  There is no distension  Palpations: Abdomen is soft  There is no mass  Tenderness: There is no abdominal tenderness  There is no guarding or rebound  Hernia: No hernia is present  Musculoskeletal:         General: No swelling  Cervical back: Neck supple  Right lower leg: No edema  Left lower leg: No edema  Feet:      Right foot:      Skin integrity: No ulcer, skin breakdown, erythema, warmth, callus or dry skin  Left foot:      Skin integrity: No ulcer, skin breakdown, erythema, warmth, callus or dry skin  Lymphadenopathy:      Cervical: No cervical adenopathy  Skin:     Findings: No rash     Neurological:      Mental Status: She is alert and oriented to person, place, and time  Cranial Nerves: No cranial nerve deficit  Motor: No abnormal muscle tone  Coordination: Coordination normal       Gait: Gait normal    Psychiatric:         Mood and Affect: Mood normal          Behavior: Behavior normal          Thought Content: Thought content normal      BMI Counseling: Body mass index is 27 89 kg/m²  The BMI is above normal  Nutrition recommendations include decreasing portion sizes  Diabetic Foot Exam    Right Foot/Ankle   Right Foot Inspection  Skin Exam: skin normal and skin intact no dry skin, no warmth, no callus, no erythema, no maceration, no abnormal color, no pre-ulcer, no ulcer and no callus                          Toe Exam: no swelling, no tenderness, erythema and  no right toe deformity  Sensory       Monofilament testing: intact  Vascular    The right DP pulse is 1+  The right PT pulse is 1+  Left Foot/Ankle  Left Foot Inspection  Skin Exam: skin normal and skin intactno dry skin, no warmth, no erythema, no maceration, normal color, no pre-ulcer, no ulcer and no callus                         Toe Exam: no swelling, no tenderness, no erythema and no left toe deformity                   Sensory       Monofilament: intact  Vascular    The left DP pulse is 0  The left PT pulse is 1+  Assign Risk Category:  Deformity present;  No loss of protective sensation; Weak pulses       Risk: 1

## 2021-07-16 PROBLEM — G89.29 CHRONIC RIGHT SHOULDER PAIN: Status: ACTIVE | Noted: 2021-07-16

## 2021-07-16 PROBLEM — M25.511 CHRONIC RIGHT SHOULDER PAIN: Status: ACTIVE | Noted: 2021-07-16

## 2021-07-16 PROBLEM — Z12.31 ENCOUNTER FOR SCREENING MAMMOGRAM FOR MALIGNANT NEOPLASM OF BREAST: Status: ACTIVE | Noted: 2021-07-16

## 2021-07-22 ENCOUNTER — OFFICE VISIT (OUTPATIENT)
Dept: PODIATRY | Facility: CLINIC | Age: 81
End: 2021-07-22
Payer: MEDICARE

## 2021-07-22 VITALS
DIASTOLIC BLOOD PRESSURE: 75 MMHG | WEIGHT: 174 LBS | SYSTOLIC BLOOD PRESSURE: 143 MMHG | HEIGHT: 66 IN | HEART RATE: 84 BPM | BODY MASS INDEX: 27.97 KG/M2

## 2021-07-22 DIAGNOSIS — M72.2 PLANTAR FASCIITIS OF LEFT FOOT: Primary | ICD-10-CM

## 2021-07-22 PROCEDURE — 99212 OFFICE O/P EST SF 10 MIN: CPT | Performed by: PODIATRIST

## 2021-07-22 NOTE — PROGRESS NOTES
Patient presents for assessment of left heel  She was given a cortisone injection 3 weeks ago but did not get much relief  On exam, palpable bursal sac noted left heel that is painful with palpation  Explained to patient that typically more than 1 injection is needed to solve a problem such as fasciitis  Patient prefers to tried physical therapy and a prescription was provided  Also dispensed Tulli  Heel cups  Patient will be rescheduled in 6 weeks

## 2021-07-30 ENCOUNTER — TELEPHONE (OUTPATIENT)
Dept: ADMINISTRATIVE | Facility: OTHER | Age: 81
End: 2021-07-30

## 2021-07-30 NOTE — TELEPHONE ENCOUNTER
----- Message from Mini Robins sent at 7/29/2021  3:16 PM EDT -----  Regarding: Nadir Norman request  07/29/21 3:16 PM    Hello, our patient Kiana Newell has had a DM eye exam completed/performed  Please assist in updating the patient chart by finding this in media  The date of service is 2/22/2021      Thank you,  Howard Tejeda MA   [facility]

## 2021-07-30 NOTE — TELEPHONE ENCOUNTER
Upon review of the In Basket request we have found/obtained the documentation  After careful review of the document we are unable to complete this request for Diabetic Eye Exam because the documentation does not have the proper verbiage (wording) needed to close the requested care gap(s)  Any additional questions or concerns should be emailed to the Practice Liaisons via [email or web address] email, please do not reply via In Basket      Thank you  Lui Dior

## 2021-08-02 ENCOUNTER — EVALUATION (OUTPATIENT)
Dept: PHYSICAL THERAPY | Facility: REHABILITATION | Age: 81
End: 2021-08-02
Payer: MEDICARE

## 2021-08-02 DIAGNOSIS — M72.2 PLANTAR FASCIITIS OF LEFT FOOT: ICD-10-CM

## 2021-08-02 DIAGNOSIS — M79.672 LEFT FOOT PAIN: Primary | ICD-10-CM

## 2021-08-02 PROCEDURE — 97161 PT EVAL LOW COMPLEX 20 MIN: CPT | Performed by: PHYSICAL THERAPIST

## 2021-08-02 PROCEDURE — 97112 NEUROMUSCULAR REEDUCATION: CPT | Performed by: PHYSICAL THERAPIST

## 2021-08-02 NOTE — PROGRESS NOTES
PT Evaluation     Today's date: 2021  Patient name: Jackie Booth  : 1940  MRN: [id number]  Referring provider: Kira Weaver DPM  Dx:   Encounter Diagnosis     ICD-10-CM    1  Left foot pain  M79 672    2  Plantar fasciitis of left foot  M72 2 Ambulatory referral to Physical Therapy                  Assessment  Assessment details: Jackie Booth is a pleasant 79 y o  female who presents with chronic L heel pain and diagnosis of plantar fascitis  The patient's greatest concerns are the pain she is experiencing, concern at no signs of improvement and fear of not being able to keep active  No further referral appears necessary at this time based upon examination results  Primary movement impairment diagnosis of B/L foot neuromuscular control deficit, L dorsiflexion hypomobility, balance deficit resulting in pathoanatomical symptoms of  L plantar fascitis which is limiting her ability to exercise or recreation, negotiate stairs and walk  Physical exam is consistent with referring diagnosis of plantar fascitis  Patient also demonstrates an atrophied L heel fat pad upon palpation  Patient demonstrates B/L decreased foot supination during heel raises, decreased L heel height during heel raise, both suggestive of poor intrinsic foot muscular control  L ankle ROM is WNL, except for limitation into dorsiflexion  There was a reproduction of mild symptoms with L plantar fascia origin palpation, suggesting low/moderate symptom irritability  Pt  will benefit from skilled PT services that includes manual therapy techniques to enhance tissue extensibility, neuromuscular re-education to facilitate motor control, therapeutic exercise to increase functional mobility, and modalities prn to reduce pain and inflammation        Primary Impairments:  1) B/L foot neuromuscular control deficit   2) L dorsiflexion hypomobility   3) balance deficit      Impairments: abnormal coordination, abnormal gait, abnormal muscle firing, abnormal muscle tone, abnormal or restricted ROM, abnormal movement, activity intolerance, difficulty understanding, impaired balance, impaired physical strength, lacks appropriate home exercise program, pain with function, poor posture  and poor body mechanics    Symptom irritability: lowUnderstanding of Dx/Px/POC: good   Prognosis: good  Prognosis details: Positive prognostic indicators include positive attitude toward recovery and good understanding of diagnosis and treatment plan options  Negative prognostic indicators include chronicity of symptoms and multiple prior failed treatments  Goals  STG  Patient will be independent with home exercise program in 1-2 visits  Patient will demonstrate symmetrical height heel raise in 3 weeks  Patient will demonstrate 10 deg of L ankle dorsiflexion in 3 weeks  LTG  Patient will be independent in comprehensive HEP upon discharge  Patient will descend a full flight of stairs without compensation or pain upon discharge  Patient will demonstrate 10s B/L SLS upon discharge  Patient will achieve goal FOTO score upon discharge  Plan  Plan details: 2x per week for 3 weeks  1x per week for 3 weks  9 total visits  Patient would benefit from: skilled physical therapy  Planned therapy interventions: activity modification, joint mobilization, manual therapy, motor coordination training, neuromuscular re-education, patient education, self care, therapeutic activities, therapeutic exercise, graded activity, home exercise program, behavior modification, graded exercise, functional ROM exercises and strengthening  Treatment plan discussed with: patient        Subjective Evaluation    History of Present Illness  Mechanism of injury: Patient presents with chronic L foot pain since 1985  She initially complained of a "lump" in her foot, and was treated for a bursitis   She was able to manage her symptoms by wearing supportive walking shoes until 2014 when she began having B/L foot pain  She was treated at [place] [address]  with shoe insets and physical therapy that was successful  In 2020 her L heel started to act up again and she was treated with a CSI for plantar fasciitis and fat pad atrophy  This was successful until 2021, when she started to have symptoms again  She was seen by Dr Alysia Villagomez in 2021 and was given an injection which provided her with relief for approx 1 month  Last week her symptoms returned with intermittent pain  She was offered another injection but declined because her most recent injection did not provide her with last relief  She continues to complain of a "lump" sensation in her heel  She notes a sharp "spasm" type pain in her posterior and medial heel that is aggravated by weight bearing, specifically in the heel  Patient reports that in the past few days she has had minimal pain, and is hopeful that the injection is starting to provide her with some relief  Pain  Current pain ratin  At best pain ratin  At worst pain ratin (while descending stairs)  Location: L posterior/medial heel  Quality: sharp, needle-like and pulling  Relieving factors: rest and relaxation  Aggravating factors: walking (descending stairs)    Treatments  Previous treatment: injection treatment and physical therapy  Patient Goals  Patient goals for therapy: decreased pain and return to sport/leisure activities  Patient goal: Walk without pain  Objective     General Comments: Ankle/Foot Comments   Gait:  L antalgic, decreased gait speed    Foot posture: L decreased arch height    Heel raise: L mild decreased height    Single Limb Stance: unable B/L     Range of Motion:   Dorsiflexion: R 10 deg  L 5 deg  Plantarflexion: WNL   Inversion: WNL   Eversion: WNL     Palpation: Mild TTP L plantar fascia origin, L atrophied heel fat pad     Manual Muscle Testing:    Ankle DF R  5/5    L  4/5   Ankle PF R  5/5    L  4/5   Ankle Inversion R  4/5    L  4/5   Ankle Eversion R  5/5    L  4/5               Precautions: none      Manuals 8/2            Ankle/foot mobs             Dorsiflexion stretching                                       Neuro Re-Ed             Toe curls 20x HEP            Toe yoga             Heel raise progression 20x HEP            Inversion isometrics             Balance             Wobble board             Education 10 min; HEP and POC            Ther Ex             Bike             Gastroc stretch 3x30s                                                                                          Ther Activity             Step up and over                          Gait Training             Address compensations                          Modalities

## 2021-08-10 ENCOUNTER — OFFICE VISIT (OUTPATIENT)
Dept: PHYSICAL THERAPY | Facility: REHABILITATION | Age: 81
End: 2021-08-10
Payer: MEDICARE

## 2021-08-10 DIAGNOSIS — M79.672 LEFT FOOT PAIN: ICD-10-CM

## 2021-08-10 DIAGNOSIS — M72.2 PLANTAR FASCIITIS OF LEFT FOOT: Primary | ICD-10-CM

## 2021-08-10 PROCEDURE — 97140 MANUAL THERAPY 1/> REGIONS: CPT | Performed by: PHYSICAL THERAPIST

## 2021-08-10 PROCEDURE — 97112 NEUROMUSCULAR REEDUCATION: CPT | Performed by: PHYSICAL THERAPIST

## 2021-08-10 PROCEDURE — 97110 THERAPEUTIC EXERCISES: CPT | Performed by: PHYSICAL THERAPIST

## 2021-08-10 NOTE — PROGRESS NOTES
Daily Note     Today's date: 8/10/2021  Patient name: Esme Ugarte  : 1940  MRN: [id number]  Referring provider: Torrie Ramirez DPM  Dx:   Encounter Diagnosis     ICD-10-CM    1  Plantar fasciitis of left foot  M72 2    2  Left foot pain  M79 672                   Subjective: Patient has been compliant with HEP  She reports no pain upon waking up, continues to have heel pain at the end of the day, especially after a lot of walking  Objective: See treatment diary below      Assessment: Patient demonstrates good response to tx  Improvement in dorsiflexion ROM noted after manual therapy  Patient tolerated all progressions without increase in symptoms  Continue to progress as tolerated  Plan: Continue per plan of care        Precautions: none      Manuals 8/2 8/10        TC AP mob/Distraction  TB 8 min; gr  IV        Dorsiflexion stretching  TB 4 min                            Neuro Re-Ed          Toe curls 20x HEP                   Heel raise progression 20x HEP 20x; w/ ball        Inversion isometrics  20x5s; seated        SLS  5x10s ea; min UE support        Wobble board  30x B/L; A/P; square        Education 10 min; HEP and POC         Ther Ex          Bike  10 min        Gastroc stretch 3x30s                                                                     Ther Activity          Step up and over  2x15 L; 4in; single UE support                  Gait Training          Address compensations                    Modalities

## 2021-08-12 ENCOUNTER — OFFICE VISIT (OUTPATIENT)
Dept: PHYSICAL THERAPY | Facility: REHABILITATION | Age: 81
End: 2021-08-12
Payer: MEDICARE

## 2021-08-12 DIAGNOSIS — M72.2 PLANTAR FASCIITIS OF LEFT FOOT: ICD-10-CM

## 2021-08-12 DIAGNOSIS — M79.672 LEFT FOOT PAIN: Primary | ICD-10-CM

## 2021-08-12 PROCEDURE — 97112 NEUROMUSCULAR REEDUCATION: CPT | Performed by: PHYSICAL THERAPIST

## 2021-08-12 PROCEDURE — 97110 THERAPEUTIC EXERCISES: CPT | Performed by: PHYSICAL THERAPIST

## 2021-08-12 PROCEDURE — 97140 MANUAL THERAPY 1/> REGIONS: CPT | Performed by: PHYSICAL THERAPIST

## 2021-08-12 NOTE — PROGRESS NOTES
Daily Note     Today's date: 2021  Patient name: Cali Anderson  : 1940  MRN: [id number]  Referring provider: Erica Perales DPM  Dx:   Encounter Diagnosis     ICD-10-CM    1  Left foot pain  M79 672    2  Plantar fasciitis of left foot  M72 2                   Subjective: Patient reports good response to last tx  She has been having minimal heel pain  She feels that the injection is working well  Objective: See treatment diary below      Assessment: Patient demonstrates good response to tx  Progress next visit to more loading into dorisflexion with elevated forefoot heel raises  Plan: Continue per plan of care        Precautions: none      Manuals 8/2 8/10 8/12       TC AP mob/Distraction  TB 8 min; gr  IV TB 8 min; gr  IV       Dorsiflexion stretching  TB 4 min TB 4 min                           Neuro Re-Ed          Toe curls 20x HEP                   Heel raise progression 20x HEP 20x; w/ ball 2x20; w/ ball       Inversion isometrics  20x5s; seated 30x5s; seated       SLS  5x10s ea; min UE support 5x10s ea; min UE support       Wobble board  30x B/L; A/P; square 30x B/L; A/P; square       Education 10 min; HEP and POC         Ther Ex          Bike  10 min 10 min       Gastroc stretch 3x30s         Leg press unilateral dorsiflexion loading   20x; 55#                                                         Ther Activity          Step up and over  2x15 L; 4in; single UE support 2x15 L; 4in; single UE support                 Gait Training          Address compensations                    Modalities

## 2021-08-17 ENCOUNTER — OFFICE VISIT (OUTPATIENT)
Dept: PHYSICAL THERAPY | Facility: REHABILITATION | Age: 81
End: 2021-08-17
Payer: MEDICARE

## 2021-08-17 DIAGNOSIS — M79.672 LEFT FOOT PAIN: Primary | ICD-10-CM

## 2021-08-17 DIAGNOSIS — M72.2 PLANTAR FASCIITIS OF LEFT FOOT: ICD-10-CM

## 2021-08-17 PROCEDURE — 97112 NEUROMUSCULAR REEDUCATION: CPT | Performed by: PHYSICAL THERAPIST

## 2021-08-17 NOTE — PROGRESS NOTES
Daily Note     Today's date: 2021  Patient name: Wiliam Potter  : 1940  MRN: [id number]  Referring provider: Collin Edmonds DPM  Dx:   Encounter Diagnosis     ICD-10-CM    1  Left foot pain  M79 672    2  Plantar fasciitis of left foot  M72 2                   Subjective: Patient presents without heel pain today  She states that she hasn't had any pain on the bottom of her foot in the past week  She reports very inconsistent episodes of posterior heel pain while driving, but this has only happened 2 times  Objective: See treatment diary below      Assessment: Patient demonstrates good response to tx  Patient is able to complete all interventions without increase in heel pain  Discuss D/C HEP next visit: gastroc stretching, heel raise off raised surface, balance  Re-eval next visit for possible D/C      Plan: D/C to HEP next visit        Precautions: none      Manuals 8/2 8/10 8/12 8/17      TC AP mob/Distraction  TB 8 min; gr  IV TB 8 min; gr  IV TB 8 min; gr  IV      Dorsiflexion stretching  TB 4 min TB 4 min TB 4 min                          Neuro Re-Ed          Toe curls 20x HEP                   Heel raise progression 20x HEP 20x; w/ ball 2x20; w/ ball 2x20; green TB foam      Inversion isometrics  20x5s; seated 30x5s; seated       SLS  5x10s ea; min UE support 5x10s ea; min UE support 4x20s ea; min UE support      Wobble board  30x B/L; A/P; square 30x B/L; A/P; square 30x B/L; A/P; square      Education 10 min; HEP and POC         Ther Ex          Bike  10 min 10 min 10 min      Gastroc stretch 3x30s         Leg press unilateral dorsiflexion loading   20x; 55# 2x20; 55#                                                        Ther Activity          Step up and over  2x15 L; 4in; single UE support 2x15 L; 4in; single UE support 2x15 L; 4in; no UE                 Gait Training          Address compensations                    Modalities

## 2021-08-19 ENCOUNTER — OFFICE VISIT (OUTPATIENT)
Dept: PHYSICAL THERAPY | Facility: REHABILITATION | Age: 81
End: 2021-08-19
Payer: MEDICARE

## 2021-08-19 DIAGNOSIS — M72.2 PLANTAR FASCIITIS OF LEFT FOOT: ICD-10-CM

## 2021-08-19 DIAGNOSIS — M79.672 LEFT FOOT PAIN: Primary | ICD-10-CM

## 2021-08-19 PROCEDURE — 97112 NEUROMUSCULAR REEDUCATION: CPT | Performed by: PHYSICAL THERAPIST

## 2021-08-19 PROCEDURE — 97110 THERAPEUTIC EXERCISES: CPT | Performed by: PHYSICAL THERAPIST

## 2021-08-19 PROCEDURE — 97140 MANUAL THERAPY 1/> REGIONS: CPT | Performed by: PHYSICAL THERAPIST

## 2021-08-19 NOTE — PROGRESS NOTES
PT Discharge     Today's date: 2021  Patient name: Berta Byrne  : 1940  MRN: [id number]  Referring provider: Yusuf Dai DPM  Dx:   Encounter Diagnosis     ICD-10-CM    1  Left foot pain  M79 672    2  Plantar fasciitis of left foot  M72 2                   Subjective: Patient presents without heel pain  She states that she is pleased with her pain levels and functional level  Pain: 0/10  Worst: 0/10    Objective: See treatment diary below    Heel raise: symmetrical height    Single Limb Stance: 10s B/L     Range of Motion:   Dorsiflexion: R 10 deg  L 10 deg  Plantarflexion: WNL   Inversion: WNL    Eversion: WNL      Assessment: Berta Byrne has been compliant with attending PT and home exercise program since initial eval   Gagandeep Bernard  has made improvements in objective data since initial evalulation and has achieved all goals  Patient reports having returned to their prior level or function  Patient provided with updated Home Exercise Program, all questions answered, verbalized understanding and agreement to plan of care  Thus it was mutually decided to discontinue this episode of care and transition to Home Exercise Program       Goals  STG  Patient will be independent with home exercise program in 1-2 visits  Complete  Patient will demonstrate symmetrical height heel raise in 3 weeks  Complete  Patient will demonstrate 10 deg of L ankle dorsiflexion in 3 weeks  Complete    LTG  Patient will be independent in comprehensive HEP upon discharge  Complete  Patient will descend a full flight of stairs without compensation or pain upon discharge  Complete  Patient will demonstrate 10s B/L SLS upon discharge  Complete  Patient will achieve goal FOTO score upon discharge  Complete      Plan: D/C to HEP        Precautions: none      Manuals 8/2 8/10 8/12 8/17 8/19     TC AP mob/Distraction  TB 8 min; gr  IV TB 8 min; gr  IV TB 8 min; gr  IV      Dorsiflexion stretching  TB 4 min TB 4 min TB 4 min Objective measures     10 min     Neuro Re-Ed          Toe curls 20x HEP                   Heel raise progression 20x HEP 20x; w/ ball 2x20; w/ ball 2x20; green TB foam      Inversion isometrics  20x5s; seated 30x5s; seated       SLS  5x10s ea; min UE support 5x10s ea; min UE support 4x20s ea; min UE support      Wobble board  30x B/L; A/P; square 30x B/L; A/P; square 30x B/L; A/P; square      Education 10 min; HEP and POC    20 min; HEP discussion     Ther Ex          Bike  10 min 10 min 10 min 10 min     Gastroc stretch 3x30s         Leg press unilateral dorsiflexion loading   20x; 55# 2x20; 55#                                                        Ther Activity          Step up and over  2x15 L; 4in; single UE support 2x15 L; 4in; single UE support 2x15 L; 4in; no UE                 Gait Training          Address compensations                    Modalities

## 2021-08-24 ENCOUNTER — APPOINTMENT (OUTPATIENT)
Dept: PHYSICAL THERAPY | Facility: REHABILITATION | Age: 81
End: 2021-08-24
Payer: MEDICARE

## 2021-08-26 ENCOUNTER — APPOINTMENT (OUTPATIENT)
Dept: PHYSICAL THERAPY | Facility: REHABILITATION | Age: 81
End: 2021-08-26
Payer: MEDICARE

## 2021-09-02 ENCOUNTER — OFFICE VISIT (OUTPATIENT)
Dept: PODIATRY | Facility: CLINIC | Age: 81
End: 2021-09-02
Payer: MEDICARE

## 2021-09-02 VITALS
WEIGHT: 174 LBS | DIASTOLIC BLOOD PRESSURE: 85 MMHG | BODY MASS INDEX: 27.97 KG/M2 | SYSTOLIC BLOOD PRESSURE: 133 MMHG | HEIGHT: 66 IN | HEART RATE: 82 BPM

## 2021-09-02 DIAGNOSIS — M72.2 PLANTAR FASCIITIS OF LEFT FOOT: Primary | ICD-10-CM

## 2021-09-02 DIAGNOSIS — M20.41 HAMMER TOE OF RIGHT FOOT: ICD-10-CM

## 2021-09-02 DIAGNOSIS — L84 CORNS: ICD-10-CM

## 2021-09-02 PROCEDURE — 99212 OFFICE O/P EST SF 10 MIN: CPT | Performed by: PODIATRIST

## 2021-09-02 NOTE — PROGRESS NOTES
Patient presents for assessment of left heel  Minimal pain now related  She responded well to a combination of 2 cortisone injections and physical therapy  Patient has a painful corn on the tip of the right 2nd toe  Treatment consisted of lesion trimming  Patient has a hammertoe deformity of the 2nd toe and will likely lead to a recurrence of the corn  Reappoint p r n

## 2021-09-03 ENCOUNTER — HOSPITAL ENCOUNTER (OUTPATIENT)
Dept: ULTRASOUND IMAGING | Facility: HOSPITAL | Age: 81
Discharge: HOME | End: 2021-09-03
Attending: UROLOGY
Payer: MEDICARE

## 2021-09-03 DIAGNOSIS — N28.1 COMPLEX RENAL CYST: ICD-10-CM

## 2021-09-03 PROCEDURE — 76770 US EXAM ABDO BACK WALL COMP: CPT

## 2021-10-08 ENCOUNTER — OFFICE VISIT (OUTPATIENT)
Dept: UROLOGY | Facility: MEDICAL CENTER | Age: 81
End: 2021-10-08
Payer: MEDICARE

## 2021-10-08 VITALS
HEIGHT: 66 IN | DIASTOLIC BLOOD PRESSURE: 82 MMHG | BODY MASS INDEX: 27.97 KG/M2 | SYSTOLIC BLOOD PRESSURE: 142 MMHG | WEIGHT: 174 LBS

## 2021-10-08 DIAGNOSIS — N28.1 COMPLEX RENAL CYST: Primary | ICD-10-CM

## 2021-10-08 LAB
BACTERIA UR QL AUTO: NORMAL /HPF
BILIRUB UR QL STRIP: NEGATIVE
CLARITY UR: CLEAR
COLOR UR: YELLOW
GLUCOSE UR STRIP-MCNC: NEGATIVE MG/DL
HGB UR QL STRIP.AUTO: NEGATIVE
HYALINE CASTS #/AREA URNS LPF: NORMAL /LPF
KETONES UR STRIP-MCNC: NEGATIVE MG/DL
LEUKOCYTE ESTERASE UR QL STRIP: NEGATIVE
NITRITE UR QL STRIP: NEGATIVE
NON-SQ EPI CELLS URNS QL MICRO: NORMAL /HPF
PH UR STRIP.AUTO: 5.5 [PH]
PROT UR STRIP-MCNC: NEGATIVE MG/DL
RBC #/AREA URNS AUTO: NORMAL /HPF
SL AMB  POCT GLUCOSE, UA: NORMAL
SL AMB LEUKOCYTE ESTERASE,UA: NORMAL
SL AMB POCT BILIRUBIN,UA: NORMAL
SL AMB POCT BLOOD,UA: NORMAL
SL AMB POCT CLARITY,UA: CLEAR
SL AMB POCT COLOR,UA: YELLOW
SL AMB POCT KETONES,UA: NORMAL
SL AMB POCT NITRITE,UA: NORMAL
SL AMB POCT PH,UA: 6.5
SL AMB POCT SPECIFIC GRAVITY,UA: >=1.03
SL AMB POCT URINE PROTEIN: NORMAL
SL AMB POCT UROBILINOGEN: 0.2
SP GR UR STRIP.AUTO: 1.02 (ref 1–1.03)
UROBILINOGEN UR QL STRIP.AUTO: 0.2 E.U./DL
WBC #/AREA URNS AUTO: NORMAL /HPF

## 2021-10-08 PROCEDURE — 99214 OFFICE O/P EST MOD 30 MIN: CPT | Performed by: PHYSICIAN ASSISTANT

## 2021-10-08 PROCEDURE — 81001 URINALYSIS AUTO W/SCOPE: CPT | Performed by: PHYSICIAN ASSISTANT

## 2021-10-08 PROCEDURE — 81003 URINALYSIS AUTO W/O SCOPE: CPT | Performed by: PHYSICIAN ASSISTANT

## 2021-10-11 DIAGNOSIS — I10 ESSENTIAL HYPERTENSION: ICD-10-CM

## 2021-10-11 RX ORDER — DILTIAZEM HYDROCHLORIDE EXTENDED-RELEASE TABLETS 240 MG/1
240 TABLET, EXTENDED RELEASE ORAL DAILY
Qty: 90 TABLET | Refills: 1 | Status: SHIPPED | OUTPATIENT
Start: 2021-10-11 | End: 2022-03-09 | Stop reason: SDUPTHER

## 2021-10-21 ENCOUNTER — OFFICE VISIT (OUTPATIENT)
Dept: FAMILY MEDICINE CLINIC | Facility: CLINIC | Age: 81
End: 2021-10-21
Payer: MEDICARE

## 2021-10-21 VITALS
OXYGEN SATURATION: 97 % | SYSTOLIC BLOOD PRESSURE: 126 MMHG | RESPIRATION RATE: 16 BRPM | HEIGHT: 66 IN | WEIGHT: 172 LBS | TEMPERATURE: 97.6 F | DIASTOLIC BLOOD PRESSURE: 76 MMHG | BODY MASS INDEX: 27.64 KG/M2 | HEART RATE: 84 BPM

## 2021-10-21 DIAGNOSIS — N28.1 ACQUIRED COMPLEX RENAL CYST: ICD-10-CM

## 2021-10-21 DIAGNOSIS — E11.69 TYPE 2 DIABETES MELLITUS WITH OTHER SPECIFIED COMPLICATION, WITHOUT LONG-TERM CURRENT USE OF INSULIN (HCC): ICD-10-CM

## 2021-10-21 DIAGNOSIS — R31.21 ASYMPTOMATIC MICROSCOPIC HEMATURIA: ICD-10-CM

## 2021-10-21 DIAGNOSIS — Z23 FLU VACCINE NEED: ICD-10-CM

## 2021-10-21 DIAGNOSIS — R79.89 LOW VITAMIN D LEVEL: ICD-10-CM

## 2021-10-21 DIAGNOSIS — E78.00 PURE HYPERCHOLESTEROLEMIA: Primary | ICD-10-CM

## 2021-10-21 DIAGNOSIS — N18.31 CHRONIC RENAL IMPAIRMENT, STAGE 3A (HCC): ICD-10-CM

## 2021-10-21 DIAGNOSIS — I10 ESSENTIAL HYPERTENSION: ICD-10-CM

## 2021-10-21 DIAGNOSIS — I73.9 PERIPHERAL VASCULAR DISEASE (HCC): ICD-10-CM

## 2021-10-21 PROCEDURE — G0008 ADMIN INFLUENZA VIRUS VAC: HCPCS

## 2021-10-21 PROCEDURE — 90662 IIV NO PRSV INCREASED AG IM: CPT

## 2021-10-21 PROCEDURE — 99214 OFFICE O/P EST MOD 30 MIN: CPT | Performed by: FAMILY MEDICINE

## 2021-10-21 RX ORDER — ROSUVASTATIN CALCIUM 10 MG/1
10 TABLET, COATED ORAL DAILY
Qty: 90 TABLET | Refills: 3 | Status: SHIPPED | OUTPATIENT
Start: 2021-10-21

## 2021-10-21 RX ORDER — EZETIMIBE 10 MG/1
10 TABLET ORAL DAILY
Qty: 90 TABLET | Refills: 3 | Status: SHIPPED | OUTPATIENT
Start: 2021-10-21

## 2021-10-21 RX ORDER — OLMESARTAN MEDOXOMIL 20 MG/1
20 TABLET ORAL DAILY
Qty: 90 TABLET | Refills: 3 | Status: SHIPPED | OUTPATIENT
Start: 2021-10-21

## 2021-10-26 ENCOUNTER — TELEPHONE (OUTPATIENT)
Dept: PODIATRY | Facility: CLINIC | Age: 81
End: 2021-10-26

## 2021-10-27 ENCOUNTER — TELEPHONE (OUTPATIENT)
Dept: PODIATRY | Facility: CLINIC | Age: 81
End: 2021-10-27

## 2021-11-03 ENCOUNTER — HOSPITAL ENCOUNTER (OUTPATIENT)
Dept: MAMMOGRAPHY | Facility: CLINIC | Age: 81
Discharge: HOME | End: 2021-11-03
Payer: MEDICARE

## 2021-11-03 VITALS — BODY MASS INDEX: 27.64 KG/M2 | HEIGHT: 66 IN | WEIGHT: 172 LBS

## 2021-11-03 DIAGNOSIS — Z12.31 ENCOUNTER FOR SCREENING MAMMOGRAM FOR MALIGNANT NEOPLASM OF BREAST: ICD-10-CM

## 2021-11-03 PROCEDURE — 77063 BREAST TOMOSYNTHESIS BI: CPT

## 2021-11-03 PROCEDURE — 77067 SCR MAMMO BI INCL CAD: CPT

## 2021-11-11 ENCOUNTER — OFFICE VISIT (OUTPATIENT)
Dept: PODIATRY | Facility: CLINIC | Age: 81
End: 2021-11-11
Payer: MEDICARE

## 2021-11-11 VITALS
HEIGHT: 66 IN | DIASTOLIC BLOOD PRESSURE: 79 MMHG | HEART RATE: 79 BPM | BODY MASS INDEX: 27.83 KG/M2 | SYSTOLIC BLOOD PRESSURE: 149 MMHG | WEIGHT: 173.2 LBS

## 2021-11-11 DIAGNOSIS — I87.2 VENOUS INSUFFICIENCY: Primary | ICD-10-CM

## 2021-11-11 PROCEDURE — 99212 OFFICE O/P EST SF 10 MIN: CPT | Performed by: PODIATRIST

## 2021-12-02 ENCOUNTER — OFFICE VISIT (OUTPATIENT)
Dept: FAMILY MEDICINE CLINIC | Facility: CLINIC | Age: 81
End: 2021-12-02
Payer: MEDICARE

## 2021-12-02 VITALS
TEMPERATURE: 97.5 F | WEIGHT: 169 LBS | BODY MASS INDEX: 27.16 KG/M2 | HEART RATE: 96 BPM | DIASTOLIC BLOOD PRESSURE: 84 MMHG | OXYGEN SATURATION: 100 % | HEIGHT: 66 IN | RESPIRATION RATE: 16 BRPM | SYSTOLIC BLOOD PRESSURE: 136 MMHG

## 2021-12-02 DIAGNOSIS — M54.2 NECK PAIN: Primary | ICD-10-CM

## 2021-12-02 DIAGNOSIS — N18.31 CHRONIC RENAL IMPAIRMENT, STAGE 3A (HCC): ICD-10-CM

## 2021-12-02 DIAGNOSIS — I10 ESSENTIAL HYPERTENSION: ICD-10-CM

## 2021-12-02 PROCEDURE — 99213 OFFICE O/P EST LOW 20 MIN: CPT | Performed by: FAMILY MEDICINE

## 2022-01-27 ENCOUNTER — TELEPHONE (OUTPATIENT)
Dept: FAMILY MEDICINE CLINIC | Facility: CLINIC | Age: 82
End: 2022-01-27

## 2022-01-27 ENCOUNTER — OFFICE VISIT (OUTPATIENT)
Dept: FAMILY MEDICINE CLINIC | Facility: CLINIC | Age: 82
End: 2022-01-27
Payer: MEDICARE

## 2022-01-27 VITALS
WEIGHT: 169.6 LBS | SYSTOLIC BLOOD PRESSURE: 131 MMHG | BODY MASS INDEX: 27.26 KG/M2 | DIASTOLIC BLOOD PRESSURE: 68 MMHG | HEART RATE: 92 BPM | HEIGHT: 66 IN | TEMPERATURE: 97 F | OXYGEN SATURATION: 97 %

## 2022-01-27 DIAGNOSIS — R31.21 ASYMPTOMATIC MICROSCOPIC HEMATURIA: ICD-10-CM

## 2022-01-27 DIAGNOSIS — I73.9 PERIPHERAL VASCULAR DISEASE (HCC): ICD-10-CM

## 2022-01-27 DIAGNOSIS — I10 ESSENTIAL HYPERTENSION: Primary | ICD-10-CM

## 2022-01-27 DIAGNOSIS — R79.89 LOW VITAMIN D LEVEL: ICD-10-CM

## 2022-01-27 DIAGNOSIS — N28.1 ACQUIRED COMPLEX RENAL CYST: ICD-10-CM

## 2022-01-27 DIAGNOSIS — N18.31 CHRONIC RENAL IMPAIRMENT, STAGE 3A (HCC): ICD-10-CM

## 2022-01-27 DIAGNOSIS — K76.0 FATTY LIVER: ICD-10-CM

## 2022-01-27 DIAGNOSIS — E11.69 TYPE 2 DIABETES MELLITUS WITH OTHER SPECIFIED COMPLICATION, WITHOUT LONG-TERM CURRENT USE OF INSULIN (HCC): ICD-10-CM

## 2022-01-27 DIAGNOSIS — E78.00 PURE HYPERCHOLESTEROLEMIA: ICD-10-CM

## 2022-01-27 DIAGNOSIS — K76.89 LIVER CYST: ICD-10-CM

## 2022-01-27 DIAGNOSIS — E83.41 HIGH MAGNESIUM LEVELS: ICD-10-CM

## 2022-01-27 DIAGNOSIS — D72.819 LEUKOPENIA, UNSPECIFIED TYPE: ICD-10-CM

## 2022-01-27 DIAGNOSIS — M81.0 OSTEOPOROSIS WITHOUT CURRENT PATHOLOGICAL FRACTURE, UNSPECIFIED OSTEOPOROSIS TYPE: ICD-10-CM

## 2022-01-27 PROCEDURE — 99214 OFFICE O/P EST MOD 30 MIN: CPT | Performed by: FAMILY MEDICINE

## 2022-01-27 NOTE — PROGRESS NOTES
Assessment/Plan:       No problem-specific Assessment & Plan notes found for this encounter  Diagnoses and all orders for this visit:    Essential hypertension  Comments:  Controlled  To follow with the dash diet    Pure hypercholesterolemia  Comments: Well controlled  To follow with low-fat diet    Chronic renal impairment, stage 3a ([facility] [address] )  Comments:  Stable  Avoid NSAID    Type 2 diabetes mellitus with other specified complication, without long-term current use of insulin (HCC)  Comments: Well controlled  To follow with 1800 calorie diet    Peripheral vascular disease ([facility] [address] )  Comments:  Asymptomatic  Orders:  -     VAS lower limb arterial duplex, complete bilateral; Future    Low vitamin D level  Comments:  Compensated  Continue vitamin-D supplement    Asymptomatic microscopic hematuria  Comments:  Patient is following with Urology    Acquired complex renal cyst  Comments:  Patient is following with Urology    Liver cyst  -     US abdomen limited; Future    Fatty liver  -     US abdomen limited; Future    High magnesium levels  Comments:  corrected    Leukopenia, unspecified type  Comments:  Resolved    Osteoporosis without current pathological fracture, unspecified osteoporosis type  Comments:  Patient is due for bone density July this year  Fall prevention discussed weight-bearing exercise calcium and vitamin-D supplement discussed        Patient Instructions   Patient to follow up with her test results      Orders Placed This Encounter   Procedures    US abdomen limited     Liver ultrasound     Standing Status:   Future     Standing Expiration Date:   1/27/2026         Subjective:     Patient ID: Kimberly Chauhan is a [de-identified] y o  female      HPI  She for follow-up on her chronic medical problems a she well  Overweight  Patient admit to intentional weight loss by following portion control diet she walked to lose weight because she said all her life she was around 150 lbs  Diabetes    Has not been checking blood sugar at home  Denied polyuria or polydipsia  Patient see Ophthalmology on the regular basis because she has glaucoma , follows with Dr Kisha Baugh  Peripheral vascular disease  Patient stated she is taking aspirin per Dr Roxanne Schilling hear recommendation  Denied claudication  Hypertension  Admit to regular salt intake denied headache or dizziness  Hyperlipidemia admit to regular fat intake  Denied chest pain  Chronic renal insufficiency  Denied edema    Test results  Lab done January 20, 2022   Mammogram   discussed result with patient    Review of Systems   Constitutional: Negative for activity change, appetite change, chills, fatigue, fever and unexpected weight change  HENT: Negative for congestion, ear discharge, ear pain, hearing loss, nosebleeds, rhinorrhea, sinus pressure, sore throat, tinnitus, trouble swallowing and voice change  Eyes: Negative for photophobia, pain and visual disturbance  Respiratory: Negative for cough, chest tightness, shortness of breath and wheezing  Cardiovascular: Negative for chest pain, palpitations and leg swelling  Gastrointestinal: Negative for abdominal pain, anal bleeding, blood in stool, constipation, diarrhea, nausea and vomiting  Endocrine: Negative for cold intolerance, heat intolerance, polydipsia and polyuria  Genitourinary: Negative for dysuria, frequency, hematuria and urgency  Musculoskeletal: Negative for arthralgias, back pain, gait problem, joint swelling, myalgias and neck pain  Skin: Negative for rash  Neurological: Negative for dizziness, tremors, seizures, syncope, weakness, light-headedness and headaches  Hematological: Negative for adenopathy  Does not bruise/bleed easily  Psychiatric/Behavioral: Negative for agitation, behavioral problems, confusion, dysphoric mood, hallucinations and sleep disturbance  The patient is not nervous/anxious          Objective:     Physical Exam  Constitutional:       General: She is not in acute distress  Appearance: She is well-developed  She is not diaphoretic  HENT:      Head: Normocephalic and atraumatic  Right Ear: External ear normal       Left Ear: External ear normal       Nose: Nose normal       Mouth/Throat:      Pharynx: No oropharyngeal exudate  Eyes:      General: No scleral icterus  Conjunctiva/sclera: Conjunctivae normal       Pupils: Pupils are equal, round, and reactive to light  Neck:      Thyroid: No thyromegaly  Vascular: No JVD  Cardiovascular:      Rate and Rhythm: Normal rate and regular rhythm  Pulses:           Carotid pulses are 3+ on the right side and 3+ on the left side  Dorsalis pedis pulses are 3+ on the right side and 3+ on the left side  Posterior tibial pulses are 3+ on the right side and 3+ on the left side  Heart sounds: Normal heart sounds  No murmur heard  Pulmonary:      Effort: Pulmonary effort is normal  No respiratory distress  Breath sounds: Normal breath sounds  No stridor  No wheezing or rales  Abdominal:      General: Bowel sounds are normal  There is no distension  Palpations: Abdomen is soft  There is no mass  Tenderness: There is no abdominal tenderness  There is no guarding or rebound  Hernia: No hernia is present  Musculoskeletal:         General: No deformity  Normal range of motion  Cervical back: Neck supple  Right lower leg: No edema  Left lower leg: No edema  Lymphadenopathy:      Cervical: No cervical adenopathy  Skin:     Findings: No rash  Neurological:      Mental Status: She is alert and oriented to person, place, and time  Cranial Nerves: No cranial nerve deficit  Sensory: No sensory deficit  Motor: No abnormal muscle tone  Coordination: Coordination normal       Deep Tendon Reflexes: Reflexes normal    Psychiatric:         Behavior: Behavior normal          Thought Content:  Thought content normal          Judgment: Judgment normal

## 2022-03-01 ENCOUNTER — OFFICE VISIT (OUTPATIENT)
Dept: OBGYN CLINIC | Facility: CLINIC | Age: 82
End: 2022-03-01
Payer: MEDICARE

## 2022-03-01 VITALS
WEIGHT: 169 LBS | HEIGHT: 66 IN | SYSTOLIC BLOOD PRESSURE: 132 MMHG | DIASTOLIC BLOOD PRESSURE: 70 MMHG | BODY MASS INDEX: 27.16 KG/M2

## 2022-03-01 DIAGNOSIS — M70.62 TROCHANTERIC BURSITIS OF LEFT HIP: Primary | ICD-10-CM

## 2022-03-01 PROCEDURE — 99214 OFFICE O/P EST MOD 30 MIN: CPT | Performed by: PHYSICIAN ASSISTANT

## 2022-03-01 PROCEDURE — 20610 DRAIN/INJ JOINT/BURSA W/O US: CPT | Performed by: PHYSICIAN ASSISTANT

## 2022-03-01 RX ORDER — BETAMETHASONE SODIUM PHOSPHATE AND BETAMETHASONE ACETATE 3; 3 MG/ML; MG/ML
6 INJECTION, SUSPENSION INTRA-ARTICULAR; INTRALESIONAL; INTRAMUSCULAR; SOFT TISSUE
Status: COMPLETED | OUTPATIENT
Start: 2022-03-01 | End: 2022-03-01

## 2022-03-01 RX ORDER — LIDOCAINE HYDROCHLORIDE 10 MG/ML
4 INJECTION, SOLUTION INFILTRATION; PERINEURAL
Status: COMPLETED | OUTPATIENT
Start: 2022-03-01 | End: 2022-03-01

## 2022-03-01 RX ADMIN — BETAMETHASONE SODIUM PHOSPHATE AND BETAMETHASONE ACETATE 6 MG: 3; 3 INJECTION, SUSPENSION INTRA-ARTICULAR; INTRALESIONAL; INTRAMUSCULAR; SOFT TISSUE at 09:08

## 2022-03-01 RX ADMIN — LIDOCAINE HYDROCHLORIDE 4 ML: 10 INJECTION, SOLUTION INFILTRATION; PERINEURAL at 09:08

## 2022-03-01 NOTE — PROGRESS NOTES
Patient Name:  Vianca Knight  MRN:  [id number]    Assessment & Plan     Left hip greater trochanteric bursitis  1  Corticosteroid injection performed today into the left hip greater trochanteric bursa  2  Referral to physical therapy  3  Tylenol as needed for pain  Patient is unable to take NSAIDs due to kidney disease  4  Follow-up in six weeks with primary care sports medicine    Chief Complaint     Left hip pain    History of the Present Illness     Vianca Knight is a [de-identified] y o  female who reports to the office today for evaluation of her left hip  She notes an onset of pain approximately 1-2 weeks ago  She denies any injury or trauma  Pain localized primarily to the lateral aspect of the hip  She notes occasional radiation distally along the lateral thigh  Pain is worse with prolonged standing and walking  She also notes worsening pain with walking up steps  She denies any weakness or instability  She denies any radiation of the pain into the groin  She has been utilizing Biofreeze cream with improvement  She states she is unable to take NSAIDs due to kidney disease  No fevers or chills  No numbness or tingling  Physical Exam     /70   Ht 5' 6" (1 676 m)   Wt 76 7 kg (169 lb)   BMI 27 28 kg/m²     Left hip:  No gross deformity  Tenderness to palpation greater trochanter  No tenderness to palpation anterior hip and piriformis  No tenderness to palpation left SI joint  No tenderness to palpation midline and paraspinal musculature of the lumbar spine  Hip range of motion is intact and includes flexion 95, external rotation 30, internal rotation 10  Negative JOCELYN and FADDIR test   Negative logroll test   Negative straight leg raise and resisted straight leg raise test   Motor/sensation intact L2-S1 bilaterally with 5/5 strength  Eyes: Anicteric sclerae  ENT: Trachea midline  Lungs: Normal respiratory effort  CV: Capillary refill is less than 2 seconds    Skin: Intact without erythema  Lymph: No palpable lymphadenopathy  Neuro: Sensation is grossly intact to light touch  Psych: Mood and affect are appropriate  Data Review     I have personally reviewed pertinent films in PACS, and my interpretation follows:    X-rays left hip 3/1/22: No acute osseous abnormalities  No significant degenerative changes  No fracture or dislocation  Past Medical History:   Diagnosis Date    Arthritis 1995    Chronic problem    Chronic kidney disease 2020    GFR is low    Difficulty walking 6/2021    heel pain in left foot    Natanael brito 2014    Right foot    Hypertension     Liver disease 2008    liver cysts    Plantar fasciitis 11/3/20       Past Surgical History:   Procedure Laterality Date    TUBAL LIGATION Bilateral        Allergies   Allergen Reactions    Lansoprazole Diarrhea    Naproxen     Nsaids      Other reaction(s): renal dysfunction  Category: Adverse Reaction;     Sulfa Antibiotics Hives    Trimethoprim     Amoxicillin Fever and Rash     Category:  Allergy;        Current Outpatient Medications on File Prior to Visit   Medication Sig Dispense Refill    Acetaminophen (TYLENOL ARTHRITIS EXT RELIEF PO) Take by mouth (Patient not taking: Reported on 12/2/2021 )      ASPERCREME LIDOCAINE EX Apply topically      aspirin (Aspirin 81) 81 mg EC tablet       bimatoprost (LUMIGAN) 0 01 % ophthalmic drops Apply 1 drop to eye Daily      brimonidine (ALPHAGAN P) 0 1 % Apply 1 drop to eye every 12 (twelve) hours      Calcium Carbonate-Vitamin D (CALTRATE 600+D PO) Take 2 tablets by mouth daily       cholecalciferol (VITAMIN D3) 1,000 units tablet Take 1,000 Units by mouth daily      Diclofenac Sodium (VOLTAREN) 1 % Apply 2 g topically 4 (four) times a day (Patient not taking: Reported on 1/27/2022 ) 100 g 2    diltiazem (CARDIZEM LA) 240 MG 24 hr tablet Take 1 tablet (240 mg total) by mouth daily 90 tablet 1    ezetimibe (ZETIA) 10 mg tablet Take 1 tablet (10 mg total) by mouth daily 90 tablet 3    Multiple Vitamins-Minerals (CENTRUM SILVER ULTRA WOMENS PO) Take 1 tablet by mouth daily      olmesartan (Benicar) 20 mg tablet Take 1 tablet (20 mg total) by mouth daily 90 tablet 3    rosuvastatin (CRESTOR) 10 MG tablet Take 1 tablet (10 mg total) by mouth daily 90 tablet 3     No current facility-administered medications on file prior to visit  Social History     Tobacco Use    Smoking status: Former Smoker     Packs/day: 0 25     Years: 15 00     Pack years: 3 75     Types: Cigarettes     Quit date: 10/20/2003     Years since quittin 3    Smokeless tobacco: Never Used    Tobacco comment: I did quit   Vaping Use    Vaping Use: Never used   Substance Use Topics    Alcohol use: Yes     Alcohol/week: 0 0 standard drinks     Comment: I have a beer or mixed drink when in a restaurant    Drug use: No       Family History   Problem Relation Age of Onset    Breast cancer Mother 46    Heart attack Father     Coronary artery disease Father     Arthritis Father             Heart disease Father            Johanna Cojennifer Colon polyps Brother     No Known Problems Maternal Grandmother     No Known Problems Maternal Grandfather     No Known Problems Paternal Grandmother     No Known Problems Paternal Grandfather     No Known Problems Son     No Known Problems Maternal Aunt     No Known Problems Maternal Aunt     No Known Problems Maternal Aunt     No Known Problems Maternal Aunt     No Known Problems Maternal Aunt        Review of Systems     As stated in the HPI  All other systems reviewed and are negative        Large joint arthrocentesis: L greater trochanteric bursa  Procedure Details  Location: hip - L greater trochanteric bursa  Needle size: 22 G  Ultrasound guidance: no  Approach: lateral  Medications administered: 4 mL lidocaine 1 %; 6 mg betamethasone acetate-betamethasone sodium phosphate 6 (3-3) mg/mL    Patient tolerance: patient tolerated the procedure well with no immediate complications  Dressing:  Sterile dressing applied

## 2022-03-02 ENCOUNTER — EVALUATION (OUTPATIENT)
Dept: PHYSICAL THERAPY | Facility: REHABILITATION | Age: 82
End: 2022-03-02
Payer: MEDICARE

## 2022-03-02 DIAGNOSIS — M25.552 LEFT HIP PAIN: Primary | ICD-10-CM

## 2022-03-02 DIAGNOSIS — M70.62 TROCHANTERIC BURSITIS OF LEFT HIP: ICD-10-CM

## 2022-03-02 PROCEDURE — 97161 PT EVAL LOW COMPLEX 20 MIN: CPT

## 2022-03-02 PROCEDURE — 97110 THERAPEUTIC EXERCISES: CPT

## 2022-03-02 NOTE — PROGRESS NOTES
PT Evaluation     Today's date: 3/2/2022  Patient name: Jane Dyer  : 1940  MRN: [id number]  Referring provider: Noemi Villa*  Dx:   Encounter Diagnosis     ICD-10-CM    1  Left hip pain  M25 552    2  Trochanteric bursitis of left hip  M70 62 Ambulatory Referral to Physical Therapy                  Assessment  Assessment details: Jane Dyer is a [de-identified]y o  year old female with a referred dx of trochanteric bursitis of L hip  Pt presents with pain with functional activities such as walking long distances and negotiating stairs  Upon further clinical testing, pt demonstrates global LE tightness, decreased LE strength, and impaired balance  Pt has pain change with L/s repeated motions, indicative of potential L/s pathology  Pt would benefit from skilled OP PT to address these, and the below impairments in order to optimize outcomes and promote return to functional baseline  Impairments: abnormal or restricted ROM, abnormal movement, activity intolerance, impaired balance, impaired physical strength, lacks appropriate home exercise program, weight-bearing intolerance, poor posture  and poor body mechanics    Goals    Short Term Goals: In 4 weeks, the patient will:  1  Report decrease in worst pain score by 2 points for improved QOL  2  Improve hip abd strength by 1/2 grade for decreased genu valgum during functional squat  3  Supervision with HEP for self care  Long Term Goals: In 8 weeks, the patient will:  1  Improve hip abd and ext strength by 1 grade for improved LE function during stair negotiation  2  FOTO to greater than predicted value  3  Independent with comprehensive HEP upon discharge  4  Negotiate 1 flight of stairs with subjective improvement in difficulty  5  Improve SLS balance to 15 sec on L to decrease fall risk      Plan  Patient would benefit from: skilled physical therapy  Referral necessary: No  Planned modality interventions: cryotherapy  Planned therapy interventions: activity modification, ADL retraining, manual therapy, neuromuscular re-education, balance, body mechanics training, patient education, postural training, strengthening, stretching, therapeutic activities, therapeutic exercise, home exercise program, graded motor, graded exercise, graded activity, gait training, functional ROM exercises and flexibility  Frequency: 2x week  Duration in weeks: 8  Treatment plan discussed with: patient        Subjective Evaluation    History of Present Illness  Mechanism of injury: Pt reports hx of L hip pain since   Previous hx of same pain several months ago, but subsided with "home remedies " Pt notes pain started when getting up off couch, and reports "I had to walk with a cane that night " Pt seen by ortho on 3/1 and given injection and dx of L trochanteric bursitis  Pt reports difficulty negotiating stairs due to "putting pressure on the leg," and requires step to pattern  Pt reports hip has not been "as sore since the injection " Pt also reports hx of low back pain, but relieves with heat and cold pack  Pt denies numbness/tingling/changes in sleep  No BB changes     Pain  Current pain ratin  At best pain ratin  At worst pain ratin  Quality: dull ache  Relieving factors: ice and heat  Aggravating factors: stair climbing and walking  Progression: improved    Social Support  Steps to enter house: yes (5)  Stairs in house: yes (1 flight)   Lives in: apartment  Lives with: alone (boyfriend helps with ADLs)    Employment status: not working (retired)  Patient Goals  Patient goals for therapy: decreased pain, independence with ADLs/IADLs and return to sport/leisure activities (negotiate steps "normal")          Objective         OBJECTIVE:    Standing Posture & Lower Extremity Alignment:  Structure/Joint         Pelvis (Tilt)  Anterior X Neutral  Posterior   Iliac Crests  Right Elevated X Neutral  Left Elevated   Knee - Frontal   Genuvalgum X Neutral Genuvarum   Knee - Sagittal  Genurecurvatum X Neutral       Range of Motion: Goniometric revealed the following findings (in degrees)  Joint Motion Right: 3/2/2022 Left: 3/2/2022   Hip Extension [facility]   Hip External Rotation WFL 25% restrict   Hip Internal Rotation [facility]   Knee Extension [facility] WFL   Knee Flexion [facility] WF     Strength: MMT revealed the following findings  Joint Motion Right: 3/2/2022 Left: 3/2/2022   Hip Flexion 4/5 4/5   Hip Abduction 3+/5 3+/5   Hip Extension 4-/5 4-/5   Knee Extension 4+/5 4+/5   Knee Flexion 4/5 4/5   Ankle Plantarflexion 5/5 5/5   Ankle Dorsiflexion 5/5 5/5     Additional Assessments:  Palpation: No pain to L lateral hip globally  Observation: no visible swelling  Gait Pattern: normal stance time L compared to R, forward flexed  Balance: SLS R 15 sec, L 4 sec  Joint Mobility: WFL  Squat: forward WS, reports no pain, genu valgum     L/s Screen:   ROM: Limited 50% ext at L/s  Repeated movements: Increase in pain with repeated L/s flexion - 1/10   Pain relieved with repeated L/s ext - 0/10  No pain recreation with PAVIM testing, joint mobility WFL    LE Neuro Screen:   Dermatomes: WFL  Myotomes: WFL  Reflexes:    Quad tendon: 2+ B    Achilles: 2+ B  Clonus: none B  Babinski: WFL B      Special Tests:  Test (Structure evaluated) Date: 3/2/2022  ( P / N )   Augustin (Iliapsoas/Rectus Fem) POS +   Claire (ITB) + POS   90/90 SLR (Hamstring) + POS (-35 deg)   Ely (Rectus Femoris) + POS   JOCELYN -   FADIR -   Scour -   Piriformis tightness + POS       Outcome Measures:   FOTO: 53            Pertinent Findings and Outcome Measures:                                                                                                                                                                         Test / Measure  3/2/2022      FOTO 53      Hip abd MMT 3+/5      SLS 4 sec      Hip ext MMT 4-/5            Precautions: Hard of hearing, OA      Manuals 3/2            Hamstring/quad/ITB stretch Neuro Re-Ed             SLR             Mini squats             Reverse hypers                                                                 Ther Ex             RB/NS             Prone quad stretch, strap 30"x2 hep            Supine hamstring stretch, strap 30"x2 hep            Bridge x20 hep            Standing hip abd x10 hep            Prone hip ext             STS             SL hip abd             Piriformis stretch             L/s ext standing             Ther Activity             Step ups             Side stepping             Gait Training                                       Modalities

## 2022-03-03 ENCOUNTER — OFFICE VISIT (OUTPATIENT)
Dept: PHYSICAL THERAPY | Facility: REHABILITATION | Age: 82
End: 2022-03-03
Payer: MEDICARE

## 2022-03-03 DIAGNOSIS — M25.552 LEFT HIP PAIN: Primary | ICD-10-CM

## 2022-03-03 DIAGNOSIS — M70.62 TROCHANTERIC BURSITIS OF LEFT HIP: ICD-10-CM

## 2022-03-03 PROCEDURE — 97112 NEUROMUSCULAR REEDUCATION: CPT | Performed by: PHYSICAL THERAPIST

## 2022-03-03 PROCEDURE — 97110 THERAPEUTIC EXERCISES: CPT | Performed by: PHYSICAL THERAPIST

## 2022-03-03 NOTE — PROGRESS NOTES
Daily Note     Today's date: 3/3/2022  Patient name: Ariel Arteaga  : 1940  MRN: [id number]  Referring provider: Carmen Rivas*  Dx:   Encounter Diagnosis     ICD-10-CM    1  Left hip pain  M25 552    2  Trochanteric bursitis of left hip  M70 62                   Subjective: The hip is a bit sore today from the evaluation yesterday  Objective: See treatment diary below      Assessment: Tolerated treatment well  Did well with initial exercises to work on hip mobility, hip motor control, and lumbopelvic mobility  Patient would benefit from continued PT      Plan: Continue per plan of care        Pertinent Findings and Outcome Measures:                                                                                                                                                                         Test / Measure  3/2/2022      FOTO 53      Hip abd MMT 3+/5      SLS 4 sec      Hip ext MMT 4-/5            Precautions: Hard of hearing, OA      Manuals 3/ 3/3           Hamstring/quad/ITB stretch  5' SE           L Hip LAD   SE Gr III 3'                                      Neuro Re-Ed             SLR             Mini squats             Reverse hypers             Supine Hip Abd   gtb 3x8 3" hold                                                  Ther Ex             RB/NS  10' L5           Prone quad stretch, strap 30"x2 hep 30"x2           Supine hamstring stretch, strap 30"x2 hep 30"x2           Bridge x20 hep 2x15           Standing hip abd x10 hep 2x10 B           Prone hip ext             STS             SL hip abd             Piriformis stretch             L/s ext standing  2x10           Ther Activity             Step ups             Side stepping             Gait Training                                       Modalities

## 2022-03-08 ENCOUNTER — OFFICE VISIT (OUTPATIENT)
Dept: PHYSICAL THERAPY | Facility: REHABILITATION | Age: 82
End: 2022-03-08
Payer: MEDICARE

## 2022-03-08 DIAGNOSIS — M70.62 TROCHANTERIC BURSITIS OF LEFT HIP: ICD-10-CM

## 2022-03-08 DIAGNOSIS — M25.552 LEFT HIP PAIN: Primary | ICD-10-CM

## 2022-03-08 PROCEDURE — 97112 NEUROMUSCULAR REEDUCATION: CPT

## 2022-03-08 PROCEDURE — 97110 THERAPEUTIC EXERCISES: CPT

## 2022-03-08 NOTE — PROGRESS NOTES
Daily Note     Today's date: 3/8/2022  Patient name: Uyen Bunch  : 1940  MRN: [id number]  Referring provider: Maggie Porter*  Dx:   Encounter Diagnosis     ICD-10-CM    1  Left hip pain  M25 552    2  Trochanteric bursitis of left hip  M70 62                   Subjective: Pt states she's having an easier time going up steps than she was previously      Objective: See treatment diary below      Assessment: Tolerated treatment well  Patient would benefit from continued PT   Pt  able to complete all exercises with no increase in pain during or after session  Pt able to progress exercises this session without complaint  Pt 1:1 with PTA [id number], IEP for remainder  Plan: Continue per plan of care        Pertinent Findings and Outcome Measures:                                                                                                                                                                         Test / Measure  3/2/2022      FOTO 53      Hip abd MMT 3+/5      SLS 4 sec      Hip ext MMT 4-/5            Precautions: Hard of hearing, OA      Manuals 3/2 3/3 3/8          Hamstring/quad/ITB stretch  5' SE           L Hip LAD   SE Gr III 3'                                      Neuro Re-Ed             SLR             Mini squats   2x10          Reverse hypers             Supine Hip Abd   gtb 3x8 3" hold 3x10 gtb                                                 Ther Ex             RB/NS  10' L5 10' L5          Prone quad stretch, strap 30"x2 hep 30"x2           Supine hamstring stretch, strap 30"x2 hep 30"x2 3x30"          Bridge x20 hep 2x15 2x15          Standing hip abd x10 hep 2x10 B 2x15 b/l          Prone hip ext             STS   10x w/foam          SL hip abd             Piriformis stretch             L/s ext standing  2x10           Ther Activity             Step ups   15x ea LSU/FSU 6"          Side stepping   3 laps          Gait Training Modalities

## 2022-03-09 DIAGNOSIS — I10 ESSENTIAL HYPERTENSION: ICD-10-CM

## 2022-03-09 RX ORDER — DILTIAZEM HYDROCHLORIDE EXTENDED-RELEASE TABLETS 240 MG/1
240 TABLET, EXTENDED RELEASE ORAL DAILY
Qty: 90 TABLET | Refills: 1 | Status: SHIPPED | OUTPATIENT
Start: 2022-03-09

## 2022-03-11 ENCOUNTER — OFFICE VISIT (OUTPATIENT)
Dept: PHYSICAL THERAPY | Facility: REHABILITATION | Age: 82
End: 2022-03-11
Payer: MEDICARE

## 2022-03-11 DIAGNOSIS — M70.62 TROCHANTERIC BURSITIS OF LEFT HIP: ICD-10-CM

## 2022-03-11 DIAGNOSIS — M25.552 LEFT HIP PAIN: Primary | ICD-10-CM

## 2022-03-11 PROCEDURE — 97110 THERAPEUTIC EXERCISES: CPT

## 2022-03-11 PROCEDURE — 97140 MANUAL THERAPY 1/> REGIONS: CPT

## 2022-03-11 NOTE — PROGRESS NOTES
Daily Note     Today's date: 3/11/2022  Patient name: Bucky Waters  : 1940  MRN: [id number]  Referring provider: Kristina Morel  Dx:   Encounter Diagnosis     ICD-10-CM    1  Left hip pain  M25 552    2  Trochanteric bursitis of left hip  M70 62                   Subjective: Pt reports increased soreness following previous session in anterior thigh/quad  Pt reports this may have been due to step up exercises  Pt continues to reports improvement in negotiating stairs  Objective: See treatment diary below      Assessment: Tolerated treatment well  Pt able to progress several exercises today without c/o  Continues to have relief with manual stretching and LAD at hip  Held step ups due to pt c/o increased soreness after last session, add back to POC NV  Monitor response at f/u  Encouraged pt to continue with pain relief strategies at home  Patient would benefit from continued PT  1:1 with Ronnell Tolentino DPT for entirety of tx  Plan: Continue per plan of care        Pertinent Findings and Outcome Measures:                                                                                                                                                                         Test / Measure  3/2/2022      FOTO 53      Hip abd MMT 3+/5      SLS 4 sec      Hip ext MMT 4-/5            Precautions: Hard of hearing, OA      Manuals 3/2 3/3 3/8 3         Hamstring/quad/ITB stretch  5' SE  CM 5'         L Hip LAD   SE Gr III 3'   CM Gr III 3'                                   Neuro Re-Ed             SLR             Mini squats   2x10 2x10         Reverse hypers             Supine Hip Abd   gtb 3x8 3" hold 3x10 gtb                                                 Ther Ex             RB/NS  10' L5 10' L5 10' L5         Prone quad stretch, strap 30"x2 hep 30"x2           Supine hamstring stretch, strap 30"x2 hep 30"x2 3x30" np         Bridge x20 hep 2x15 2x15 3x10         Standing hip abd x10 hep 2x10 B 2x15 b/l 2x10 B 2 5#         Prone hip ext    2x8         STS   10x w/foam 2x10 chair +foam         SL hip abd    2x8         Piriformis stretch    30"x3          L/s ext standing  2x10           Clamshells    2x10 supine gtb         Standing hip flexor stretch    30"x2         Leg press     NV        Ther Activity             Step ups   15x ea LSU/FSU 6" held 4" NV        Side stepping   3 laps  tband NV        Gait Training                                       Modalities

## 2022-03-14 ENCOUNTER — OFFICE VISIT (OUTPATIENT)
Dept: PHYSICAL THERAPY | Facility: REHABILITATION | Age: 82
End: 2022-03-14
Payer: MEDICARE

## 2022-03-14 DIAGNOSIS — M25.552 LEFT HIP PAIN: Primary | ICD-10-CM

## 2022-03-14 DIAGNOSIS — M70.62 TROCHANTERIC BURSITIS OF LEFT HIP: ICD-10-CM

## 2022-03-14 PROCEDURE — 97140 MANUAL THERAPY 1/> REGIONS: CPT

## 2022-03-14 PROCEDURE — 97110 THERAPEUTIC EXERCISES: CPT

## 2022-03-14 NOTE — PROGRESS NOTES
Daily Note     Today's date: 3/14/2022  Patient name: Kaely Davidson  : 1940  MRN: [id number]  Referring provider: Gee Florentino*  Dx:   Encounter Diagnosis     ICD-10-CM    1  Left hip pain  M25 552    2  Trochanteric bursitis of left hip  M70 62                   Subjective: Pt reports improved tolerance to stair negotiation over the weekend  She reports climbing 1 flight multiple times at both her house and her friends house with minimal quad soreness, and feels it is improving  Objective: See treatment diary below      Assessment: Tolerated treatment well  Pt continues to progress strengthening exercises well, and introduction of new therex with no pain  Pt reports LE fatigue post-tx, but no pain onset  FOTO updated today  Patient would benefit from continued PT  1:1 with Hair Rush DPT for entirety of tx  Plan: Continue per plan of care        Pertinent Findings and Outcome Measures:                                                                                                                                                                         Test / Measure  3/2/2022      FOTO 53      Hip abd MMT 3+/5      SLS 4 sec      Hip ext MMT 4-/5            Precautions: Hard of hearing, OA      Manuals 3/2 3/3 3/8 3/11 3/14        Hamstring/quad/ITB stretch  5' SE  CM 5' CM 5'        L Hip LAD   SE Gr III 3'   CM Gr III 3' CM Gr III 3'                                  Neuro Re-Ed             SLR     x10        Mini squats   2x10 2x10 x10        Reverse hypers             Supine Hip Abd   gtb 3x8 3" hold 3x10 gtb                                                 Ther Ex             RB/NS  10' L5 10' L5 10' L5 10' L5        Prone quad stretch, strap 30"x2 hep 30"x2           Supine hamstring stretch, strap 30"x2 hep 30"x2 3x30" np         Bridge x20 hep 2x15 2x15 3x10 2x15        Standing hip abd x10 hep 2x10 B 2x15 b/l 2x10 B 2 5# np        Prone hip ext    2x8 2x10        STS 10x w/foam 2x10 chair +foam 2x10 chair +foam        SL hip abd    2x8 2x10        Piriformis stretch    30"x3  30"x3         L/s ext standing  2x10           Clamshells    2x10 supine gtb 2x10 supine gtb        Standing hip flexor stretch    30"x2 30"x2        Leg press     x10 55#        Ther Activity             Step ups   15x ea LSU/FSU 6" held 4" NV        Side stepping   3 laps  x4 laps otb        Gait Training                                       Modalities

## 2022-03-18 ENCOUNTER — OFFICE VISIT (OUTPATIENT)
Dept: PHYSICAL THERAPY | Facility: REHABILITATION | Age: 82
End: 2022-03-18
Payer: MEDICARE

## 2022-03-18 DIAGNOSIS — M70.62 TROCHANTERIC BURSITIS OF LEFT HIP: ICD-10-CM

## 2022-03-18 DIAGNOSIS — M25.552 LEFT HIP PAIN: Primary | ICD-10-CM

## 2022-03-18 PROCEDURE — 97112 NEUROMUSCULAR REEDUCATION: CPT

## 2022-03-18 PROCEDURE — 97110 THERAPEUTIC EXERCISES: CPT

## 2022-03-18 PROCEDURE — 97140 MANUAL THERAPY 1/> REGIONS: CPT

## 2022-03-21 ENCOUNTER — OFFICE VISIT (OUTPATIENT)
Dept: PHYSICAL THERAPY | Facility: REHABILITATION | Age: 82
End: 2022-03-21
Payer: MEDICARE

## 2022-03-21 DIAGNOSIS — M25.552 LEFT HIP PAIN: Primary | ICD-10-CM

## 2022-03-21 DIAGNOSIS — M70.62 TROCHANTERIC BURSITIS OF LEFT HIP: ICD-10-CM

## 2022-03-21 PROCEDURE — 97110 THERAPEUTIC EXERCISES: CPT

## 2022-03-21 PROCEDURE — 97140 MANUAL THERAPY 1/> REGIONS: CPT

## 2022-03-21 NOTE — PROGRESS NOTES
Daily Note     Today's date: 3/21/2022  Patient name: Jane Dyer  : 1940  MRN: [id number]  Referring provider: Noemi Villa*  Dx:   Encounter Diagnosis     ICD-10-CM    1  Left hip pain  M25 552    2  Trochanteric bursitis of left hip  M70 62                   Subjective: Pt reports "some ache in my leg" following sleeping on different mattress over the weekend  Pt reports no pain otherwise  Objective: See treatment diary below      Assessment: Tolerated treatment well  Pt able to progress several strengthening exercises without pain onset  Pt notes minimal stretch with piriformis stretch, np today  Continue to progress as tolerated  Patient would benefit from continued PT  1:1 with [facility], DPT for entirety of tx  Plan: Continue per plan of care        Pertinent Findings and Outcome Measures:                                                                                                                                                                         Test / Measure  3/2/2022      FOTO 53      Hip abd MMT 3+/5      SLS 4 sec      Hip ext MMT 4-/5            Precautions: Hard of hearing, OA      Manuals 3/2 3/3 3/8 3/11 3/14 3/18 3/21      Hamstring/quad/ITB stretch  5' SE  CM 5' CM 5' CM 5' CM 5'      L Hip LAD   SE Gr III 3'   CM Gr III 3' CM Gr III 3' CM Gr III 3' CM Gr III 3'                                Neuro Re-Ed             SLR     x10 x12 x15      Mini squats   2x10 2x10 x10 x10 x10      Reverse hypers             Supine Hip Abd   gtb 3x8 3" hold 3x10 gtb                                                 Ther Ex             NS  10' L5 10' L5 10' L5 10' L5 10' L5 10' L5      Prone quad stretch, strap 30"x2 hep 30"x2           Supine hamstring stretch, strap 30"x2 hep 30"x2 3x30" np         Bridge x20 hep 2x15 2x15 3x10 2x15 np       Standing hip abd x10 hep 2x10 B 2x15 b/l 2x10 B 2 5# np        Prone hip ext    2x8 2x10 2x10 2x10      STS   10x w/foam 2x10 chair +foam 2x10 chair +foam 2x10 chair +foam x10 chair      SL hip abd    2x8 2x10 2x10 2x10      Piriformis stretch    30"x3  30"x3  30"x3  np      L/s ext standing  2x10           Clamshells    2x10 supine gtb 2x10 supine gtb 2x10 supine btb 2x10 supine btb      Standing hip flexor stretch    30"x2 30"x2 30"x2 dc      Leg press     x10 55# 2x10 55# 2x10 55#      Ther Activity             Step ups   15x ea LSU/FSU 6" held 4" NV 2x10 4" fwd 2x10 6" fwd 8" or # NV     Side stepping   3 laps  x4 laps otb        Gait Training                                       Modalities

## 2022-03-25 ENCOUNTER — OFFICE VISIT (OUTPATIENT)
Dept: PHYSICAL THERAPY | Facility: REHABILITATION | Age: 82
End: 2022-03-25
Payer: MEDICARE

## 2022-03-25 DIAGNOSIS — M25.552 LEFT HIP PAIN: Primary | ICD-10-CM

## 2022-03-25 DIAGNOSIS — M70.62 TROCHANTERIC BURSITIS OF LEFT HIP: ICD-10-CM

## 2022-03-25 PROCEDURE — 97140 MANUAL THERAPY 1/> REGIONS: CPT

## 2022-03-25 PROCEDURE — 97110 THERAPEUTIC EXERCISES: CPT

## 2022-03-25 NOTE — PROGRESS NOTES
Daily Note     Today's date: 3/25/2022  Patient name: Bob Simons  : 1940  MRN: [id number]  Referring provider: Barbara Senior*  Dx:   Encounter Diagnosis     ICD-10-CM    1  Left hip pain  M25 552    2  Trochanteric bursitis of left hip  M70 62                   Subjective: Pt reports "my hip pain comes and goes " Pt only reporting occasional soreness in quad and ant hip with negotiating high steps at friend's house  Objective: See treatment diary below      Assessment: Tolerated treatment well  Updated HEP to include SL hip abd and SLR due to pt reports of current HEP "getting easier " Pt able to demonstrate all exercises during session with good technique and no pain  Several progressions made today with good tolerance  Continue to progress as tolerated  Patient would benefit from continued PT  1:1 with Hal Flynn DPT for entirety of tx  Plan: Continue per plan of care        Pertinent Findings and Outcome Measures:                                                                                                                                                                         Test / Measure  3/2/2022      FOTO 53      Hip abd MMT 3+/5      SLS 4 sec      Hip ext MMT 4-/5            Precautions: Hard of hearing, OA      Manuals 3/2 3/3 3/8 3/11 3/14 3/18 3/21 3/25     Hamstring/quad/ITB stretch  5' SE  CM 5' CM 5' CM 5' CM 5' CM 5'     L Hip LAD   SE Gr III 3'   CM Gr III 3' CM Gr III 3' CM Gr III 3' CM Gr III 3' CM Gr III 3'                               Neuro Re-Ed             SLR     x10 x12 x15 x15     Mini squats   2x10 2x10 x10 x10 x10 np     Reverse hypers             Supine Hip Abd   gtb 3x8 3" hold 3x10 gtb                                                 Ther Ex             NS  10' L5 10' L5 10' L5 10' L5 10' L5 10' L5 10' L5     Prone quad stretch, strap 30"x2 hep 30"x2           Supine hamstring stretch, strap 30"x2 hep 30"x2 3x30" np         Bridge x20 hep 2x15 2x15 3x10 2x15 np       Standing hip abd x10 hep 2x10 B 2x15 b/l 2x10 B 2 5# np        Prone hip ext    2x8 2x10 2x10 2x10 2x10     STS   10x w/foam 2x10 chair +foam 2x10 chair +foam 2x10 chair +foam x10 chair x10 chair UE out for WS     SL hip abd    2x8 2x10 2x10 2x10 2x10     Piriformis stretch    30"x3  30"x3  30"x3  np      L/s ext standing  2x10           Clamshells    2x10 supine gtb 2x10 supine gtb 2x10 supine btb 2x10 supine btb 2x10 supine btb     Standing hip flexor stretch    30"x2 30"x2 30"x2 dc      Leg press     x10 55# 2x10 55# 2x10 55# 2x10 70#     Ther Activity             Step ups   15x ea LSU/FSU 6" held 4" NV 2x10 4" fwd 2x10 6" fwd 2x10 8"     Side stepping   3 laps  x4 laps otb        Gait Training                                       Modalities

## 2022-03-28 ENCOUNTER — OFFICE VISIT (OUTPATIENT)
Dept: PHYSICAL THERAPY | Facility: REHABILITATION | Age: 82
End: 2022-03-28
Payer: MEDICARE

## 2022-03-28 DIAGNOSIS — M25.552 LEFT HIP PAIN: Primary | ICD-10-CM

## 2022-03-28 DIAGNOSIS — M70.62 TROCHANTERIC BURSITIS OF LEFT HIP: ICD-10-CM

## 2022-03-28 PROCEDURE — 97110 THERAPEUTIC EXERCISES: CPT

## 2022-03-28 PROCEDURE — 97112 NEUROMUSCULAR REEDUCATION: CPT

## 2022-03-28 PROCEDURE — 97530 THERAPEUTIC ACTIVITIES: CPT

## 2022-03-28 NOTE — PROGRESS NOTES
Daily Note     Today's date: 3/28/2022  Patient name: Darren Anthony  : 1940  MRN: [id number]  Referring provider: Vincent Gardner  Dx:   Encounter Diagnosis     ICD-10-CM    1  Left hip pain  M25 552    2  Trochanteric bursitis of left hip  M70 62                   Subjective: Pt reports soreness in L glute today due to sleeping on different mattress last night and negotiating 12 stairs while carrying bag  Pt reports she trialed updated HEP with good tolerance  Objective: See treatment diary below      Assessment: Tolerated treatment well  Pt able to progress several standing exercises today with good tolerance and no pain  Pt reports LE fatigue post-tx but no pain  Continue to progress as tolerated  Patient would benefit from continued PT  1:1 with Indu Coleman DPT for entirety of tx  Plan: Continue per plan of care        Pertinent Findings and Outcome Measures:                                                                                                                                                                         Test / Measure  3/2/2022      FOTO 53      Hip abd MMT 3+/5      SLS 4 sec      Hip ext MMT 4-/5            Precautions: Hard of hearing, OA      Manuals 3/2 3/3 3/8 3/11 3/14 3/18 3/21 3/25 3/28    Hamstring/quad/ITB stretch  5' SE  CM 5' CM 5' CM 5' CM 5' CM 5' CM 5'    L Hip LAD   SE Gr III 3'   CM Gr III 3' CM Gr III 3' CM Gr III 3' CM Gr III 3' CM Gr III 3' CM Gr III 3'                              Neuro Re-Ed             SLR     x10 x12 x15 x15 2x10    Mini squats   2x10 2x10 x10 x10 x10 np x10    Reverse hypers             Supine Hip Abd   gtb 3x8 3" hold 3x10 gtb          SLS         30"x3 L                              Ther Ex             NS  10' L5 10' L5 10' L5 10' L5 10' L5 10' L5 10' L5 10' L5    Prone quad stretch, strap 30"x2 hep 30"x2           Supine hamstring stretch, strap 30"x2 hep 30"x2 3x30" np         Bridge x20 hep 2x15 2x15 3x10 2x15 np       Standing hip abd x10 hep 2x10 B 2x15 b/l 2x10 B 2 5# np        Prone hip ext    2x8 2x10 2x10 2x10 2x10 dc    STS   10x w/foam 2x10 chair +foam 2x10 chair +foam 2x10 chair +foam x10 chair x10 chair UE out for WS 2x10 chair UE out for WS    SL hip abd    2x8 2x10 2x10 2x10 2x10 dc    Piriformis stretch    30"x3  30"x3  30"x3  np      L/s ext standing  2x10           Clamshells    2x10 supine gtb 2x10 supine gtb 2x10 supine btb 2x10 supine btb 2x10 supine btb dc    Standing hip flexor stretch    30"x2 30"x2 30"x2 dc      Leg press     x10 55# 2x10 55# 2x10 55# 2x10 70# 2x15 70#    Ther Activity             Step ups   15x ea LSU/FSU 6" held 4" NV 2x10 4" fwd 2x10 6" fwd 2x10 8" x10 fwd, x10 lat  8" 5#    Side stepping   3 laps  x4 laps otb    x6 laps gtb    Gait Training                                       Modalities

## 2022-03-29 ENCOUNTER — HOSPITAL ENCOUNTER (OUTPATIENT)
Dept: NON INVASIVE DIAGNOSTICS | Facility: HOSPITAL | Age: 82
Discharge: HOME | End: 2022-03-29
Payer: MEDICARE

## 2022-03-29 DIAGNOSIS — I73.9 PERIPHERAL VASCULAR DISEASE (HCC): ICD-10-CM

## 2022-03-29 PROCEDURE — 93925 LOWER EXTREMITY STUDY: CPT

## 2022-03-29 PROCEDURE — 93923 UPR/LXTR ART STDY 3+ LVLS: CPT

## 2022-03-29 PROCEDURE — 93922 UPR/L XTREMITY ART 2 LEVELS: CPT | Performed by: SURGERY

## 2022-03-29 PROCEDURE — 93925 LOWER EXTREMITY STUDY: CPT | Performed by: SURGERY

## 2022-04-01 ENCOUNTER — EVALUATION (OUTPATIENT)
Dept: PHYSICAL THERAPY | Facility: REHABILITATION | Age: 82
End: 2022-04-01
Payer: MEDICARE

## 2022-04-01 DIAGNOSIS — M70.62 TROCHANTERIC BURSITIS OF LEFT HIP: ICD-10-CM

## 2022-04-01 DIAGNOSIS — M25.552 LEFT HIP PAIN: Primary | ICD-10-CM

## 2022-04-01 PROCEDURE — 97110 THERAPEUTIC EXERCISES: CPT

## 2022-04-01 PROCEDURE — 97530 THERAPEUTIC ACTIVITIES: CPT

## 2022-04-01 PROCEDURE — 97140 MANUAL THERAPY 1/> REGIONS: CPT

## 2022-04-01 NOTE — PROGRESS NOTES
Re-evaluation / Discharge     Today's date: 2022  Patient name: Alejo Holloway  : 1940  MRN: [id number]  Referring provider: Lisandro Love*  Dx:   Encounter Diagnosis     ICD-10-CM    1  Left hip pain  M25 552    2  Trochanteric bursitis of left hip  M70 62                   Subjective: Pt reports she has made significant improvement since beginning PT  Pt reports she is 95% base to fx baseline  "My pain comes and goes " Pt reports using cream and exercise during pain with good relief  Pt reports she feels comfortable wit her HEP and would like to d/c PT at this time  Pain  Current pain ratin  At best pain ratin  At worst pain ratin  Quality: dull ache  Relieving factors: ice and heat  Aggravating factors: stair climbing, pivoting   Progression: improved        Objective: See treatment diary below      Range of Motion: Goniometric revealed the following findings (in degrees)  Joint Motion Right:  Left:    Hip Extension [facility]   Hip External Rotation WFL 25% restrict   Hip Internal Rotation [facility]   Knee Extension [facility]   Knee Flexion [facility] WFL     Strength: MMT revealed the following findings  Joint Motion Right:  Left:    Hip Flexion 5/5 5/5   Hip Abduction 5/5 5/5   Hip Extension 4+/5 4+/5   Knee Extension 5/5 5/5   Knee Flexion 5/5 5/5   Ankle Plantarflexion 5/5 5/5   Ankle Dorsiflexion 5/5 5/5     Additional Assessments:  Palpation: No pain, no TTP  Observation: no visible swelling  Gait Pattern: WFL forward flexed  Balance: SLS R 15 sec, L 15 sec        Assessment: Tolerated treatment well  Pt able to demonstrate all exercises without pain and with proper technique  Pt provided with comprehensive HEP and had all questions answered to satisfaction  D/c skilled PT, transition to comprehensive HEP  [facility] 1:1 with Tyra Sotrey DPT for entirety of tx  Goals  Short Term Goals: In 4 weeks, the patient will:  1   Report decrease in worst pain score by 2 points for improved QOL  - MET  2  Improve hip abd strength by 1/2 grade for decreased genu valgum during functional squat  - MET  3  Supervision with HEP for self care  - MET    Long Term Goals: In 8 weeks, the patient will:  1  Improve hip abd and ext strength by 1 grade for improved LE function during stair negotiation  - MET  2  FOTO to greater than predicted value  - MET  3  Independent with comprehensive HEP upon discharge  - MET  4  Negotiate 1 flight of stairs with subjective improvement in difficulty  - MET   5  Improve SLS balance to 15 sec on L to decrease fall risk  - MET        Plan: D/c skilled PT, transition to comprehensive HEP        Pertinent Findings and Outcome Measures:                                                                                                                                                                         Test / Measure  3/2/2022      FOTO 53      Hip abd MMT 3+/5      SLS 4 sec      Hip ext MMT 4-/5            Precautions: Hard of hearing, OA      Manuals 3/2 3/3 3/8 3/11 3/14 3/18 3/21 3/25 3/28 4/1   Hamstring/quad/ITB stretch  5' SE  CM 5' CM 5' CM 5' CM 5' CM 5' CM 5'    L Hip LAD   SE Gr III 3'   CM Gr III 3' CM Gr III 3' CM Gr III 3' CM Gr III 3' CM Gr III 3' CM Gr III 3'    Obj testing          CM                Neuro Re-Ed             SLR     x10 x12 x15 x15 2x10 2x10 L hep   Mini squats   2x10 2x10 x10 x10 x10 np x10    Reverse hypers             Supine Hip Abd   gtb 3x8 3" hold 3x10 gtb          SLS         30"x3 L                              Ther Ex             NS  10' L5 10' L5 10' L5 10' L5 10' L5 10' L5 10' L5 10' L5 10' L5   Prone quad stretch, strap 30"x2 hep 30"x2           Supine hamstring stretch, strap 30"x2 hep 30"x2 3x30" np         Bridge x20 hep 2x15 2x15 3x10 2x15 np    2x10 hep   Standing hip abd x10 hep 2x10 B 2x15 b/l 2x10 B 2 5# np        Prone hip ext    2x8 2x10 2x10 2x10 2x10 dc 2x10 B hep   STS   10x w/foam 2x10 chair +foam 2x10 chair +foam 2x10 chair +foam x10 chair x10 chair UE out for WS 2x10 chair UE out for WS 2x10 chair UE out for WS hep   SL hip abd    2x8 2x10 2x10 2x10 2x10 dc 2x10 L hep   Piriformis stretch    30"x3  30"x3  30"x3  np      L/s ext standing  2x10           Clamshells    2x10 supine gtb 2x10 supine gtb 2x10 supine btb 2x10 supine btb 2x10 supine btb dc 2x10 L SL hep    Standing hip flexor stretch    30"x2 30"x2 30"x2 dc      Leg press     x10 55# 2x10 55# 2x10 55# 2x10 70# 2x15 70# 2x10 70#   Ther Activity             Step ups   15x ea LSU/FSU 6" held 4" NV 2x10 4" fwd 2x10 6" fwd 2x10 8" x10 fwd, x10 lat  8" 5# x12 fwd  x12 lat   8"   Side stepping   3 laps  x4 laps otb    x6 laps gtb    Gait Training                                       Modalities

## 2022-04-04 ENCOUNTER — HOSPITAL ENCOUNTER (OUTPATIENT)
Dept: ULTRASOUND IMAGING | Facility: HOSPITAL | Age: 82
Discharge: HOME | End: 2022-04-04
Attending: FAMILY MEDICINE
Payer: MEDICARE

## 2022-04-04 DIAGNOSIS — K76.0 FATTY LIVER: ICD-10-CM

## 2022-04-04 DIAGNOSIS — K76.89 LIVER CYST: ICD-10-CM

## 2022-04-04 PROCEDURE — 76705 ECHO EXAM OF ABDOMEN: CPT

## 2022-04-08 ENCOUNTER — OFFICE VISIT (OUTPATIENT)
Dept: OBGYN CLINIC | Facility: MEDICAL CENTER | Age: 82
End: 2022-04-08
Payer: MEDICARE

## 2022-04-08 VITALS
SYSTOLIC BLOOD PRESSURE: 109 MMHG | WEIGHT: 169 LBS | DIASTOLIC BLOOD PRESSURE: 75 MMHG | HEIGHT: 66 IN | BODY MASS INDEX: 27.16 KG/M2 | HEART RATE: 76 BPM

## 2022-04-08 DIAGNOSIS — M70.62 TROCHANTERIC BURSITIS OF LEFT HIP: Primary | ICD-10-CM

## 2022-04-08 PROCEDURE — 99212 OFFICE O/P EST SF 10 MIN: CPT | Performed by: EMERGENCY MEDICINE

## 2022-04-08 NOTE — PROGRESS NOTES
Assessment/Plan:    Diagnoses and all orders for this visit:    Trochanteric bursitis of left hip    Much improved s/p injection and PT    Return if symptoms worsen or fail to improve  Chief Complaint:     Chief Complaint   Patient presents with    Left Hip - Pain       Subjective:   Patient ID: Kayy Banerjee is a [de-identified] y o  female  Onset 2/17/22  Patient returns s/p L trochanteric bursa steroid injection with improvement  She denies pain, will get some stiffness  She is happy with improvement  She notes distinct left lateral hip pain and lower back pain  Taking Tylenol  Unable to take NSAIDs    Initial note:  Kayy Banerjee is a [de-identified] y o  female who reports to the office today for evaluation of her left hip  She notes an onset of pain approximately 1-2 weeks ago  She denies any injury or trauma  Pain localized primarily to the lateral aspect of the hip  She notes occasional radiation distally along the lateral thigh  Pain is worse with prolonged standing and walking  She also notes worsening pain with walking up steps  She denies any weakness or instability  She denies any radiation of the pain into the groin  She has been utilizing Biofreeze cream with improvement  She states she is unable to take NSAIDs due to kidney disease  No fevers or chills  No numbness or tingling  Review of Systems    The following portions of the patient's chart were reviewed and updated as appropriate: Allergy:    Allergies   Allergen Reactions    Lansoprazole Diarrhea    Naproxen     Nsaids      Other reaction(s): renal dysfunction  Category: Adverse Reaction;     Sulfa Antibiotics Hives    Trimethoprim     Amoxicillin Fever and Rash     Category: Allergy;           Past Medical History:   Diagnosis Date    Arthritis 1995    Chronic problem    Chronic kidney disease 2020    GFR is low    Difficulty walking 6/2021    heel pain in left foot    Fallen arches 2014    Right foot    Hypertension     Liver disease 2008    liver cysts    Plantar fasciitis 11/3/20       Past Surgical History:   Procedure Laterality Date    TUBAL LIGATION Bilateral        Social History     Socioeconomic History    Marital status:      Spouse name: Not on file    Number of children: Not on file    Years of education: Not on file    Highest education level: Not on file   Occupational History    Not on file   Tobacco Use    Smoking status: Former Smoker     Packs/day: 0 25     Years: 15 00     Pack years: 3 75     Types: Cigarettes     Quit date: 10/20/2003     Years since quittin 4    Smokeless tobacco: Never Used    Tobacco comment: I did quit   Vaping Use    Vaping Use: Never used   Substance and Sexual Activity    Alcohol use:  Yes     Alcohol/week: 0 0 standard drinks     Comment: I have a beer or mixed drink when in a restaurant    Drug use: No    Sexual activity: Not Currently   Other Topics Concern    Not on file   Social History Narrative    Not on file     Social Determinants of Health     Financial Resource Strain: Not on file   Food Insecurity: Not on file   Transportation Needs: Not on file   Physical Activity: Not on file   Stress: Not on file   Social Connections: Not on file   Intimate Partner Violence: Not on file   Housing Stability: Not on file       Family History   Problem Relation Age of Onset    Breast cancer Mother 46    Heart attack Father     Coronary artery disease Father     Arthritis Father             Heart disease Father             Colon polyps Brother     No Known Problems Maternal Grandmother     No Known Problems Maternal Grandfather     No Known Problems Paternal Grandmother     No Known Problems Paternal Grandfather     No Known Problems Son     No Known Problems Maternal Aunt     No Known Problems Maternal Aunt     No Known Problems Maternal Aunt     No Known Problems Maternal Aunt     No Known Problems Maternal Aunt Medications:    Current Outpatient Medications:     ASPERCREME LIDOCAINE EX, Apply topically, Disp: , Rfl:     aspirin (Aspirin 81) 81 mg EC tablet, , Disp: , Rfl:     bimatoprost (LUMIGAN) 0 01 % ophthalmic drops, Apply 1 drop to eye Daily, Disp: , Rfl:     brimonidine (ALPHAGAN P) 0 1 %, Apply 1 drop to eye every 12 (twelve) hours, Disp: , Rfl:     Calcium Carbonate-Vitamin D (CALTRATE 600+D PO), Take 2 tablets by mouth daily , Disp: , Rfl:     cholecalciferol (VITAMIN D3) 1,000 units tablet, Take 1,000 Units by mouth daily, Disp: , Rfl:     diltiazem (CARDIZEM LA) 240 MG 24 hr tablet, Take 1 tablet (240 mg total) by mouth daily, Disp: 90 tablet, Rfl: 1    ezetimibe (ZETIA) 10 mg tablet, Take 1 tablet (10 mg total) by mouth daily, Disp: 90 tablet, Rfl: 3    Multiple Vitamins-Minerals (CENTRUM SILVER ULTRA WOMENS PO), Take 1 tablet by mouth daily, Disp: , Rfl:     olmesartan (Benicar) 20 mg tablet, Take 1 tablet (20 mg total) by mouth daily, Disp: 90 tablet, Rfl: 3    rosuvastatin (CRESTOR) 10 MG tablet, Take 1 tablet (10 mg total) by mouth daily, Disp: 90 tablet, Rfl: 3    Acetaminophen (TYLENOL ARTHRITIS EXT RELIEF PO), Take by mouth (Patient not taking: Reported on 12/2/2021 ), Disp: , Rfl:     Diclofenac Sodium (VOLTAREN) 1 %, Apply 2 g topically 4 (four) times a day (Patient not taking: Reported on 1/27/2022 ), Disp: 100 g, Rfl: 2    Patient Active Problem List   Diagnosis    Hyperlipidemia    Diabetes (HCC)    Chronic renal insufficiency    Fatty liver    Osteoporosis    Diverticulosis    Peripheral vascular disease of lower extremity (HCC)    Multiple renal cysts    Liver cyst    Glaucoma    Cataract    Colonic polyp    Hearing loss    GERD with esophagitis    Essential hypertension    Peripheral vascular disease ([facility] [address] )    Encounter for screening mammogram for breast cancer    Over weight    Cyst of right kidney    Need for immunization against influenza    Leukopenia  Low vitamin D level    Abnormal urine cytology    Asymptomatic microscopic hematuria    High magnesium levels    Chronic right shoulder pain    Encounter for screening mammogram for malignant neoplasm of breast    Acquired complex renal cyst       Objective:  /75   Pulse 76   Ht 5' 6" (1 676 m)   Wt 76 7 kg (169 lb)   BMI 27 28 kg/m²     Ortho Exam    Physical Exam      Neurologic Exam    Procedures    I have personally reviewed pertinent films in PACS

## 2022-04-28 ENCOUNTER — OFFICE VISIT (OUTPATIENT)
Dept: FAMILY MEDICINE CLINIC | Facility: CLINIC | Age: 82
End: 2022-04-28
Payer: MEDICARE

## 2022-04-28 VITALS
TEMPERATURE: 97 F | DIASTOLIC BLOOD PRESSURE: 78 MMHG | HEART RATE: 90 BPM | SYSTOLIC BLOOD PRESSURE: 132 MMHG | HEIGHT: 66 IN | BODY MASS INDEX: 26.78 KG/M2 | OXYGEN SATURATION: 99 % | WEIGHT: 166.6 LBS

## 2022-04-28 DIAGNOSIS — N18.31 CHRONIC RENAL IMPAIRMENT, STAGE 3A (HCC): ICD-10-CM

## 2022-04-28 DIAGNOSIS — R31.21 ASYMPTOMATIC MICROSCOPIC HEMATURIA: ICD-10-CM

## 2022-04-28 DIAGNOSIS — I73.9 PERIPHERAL VASCULAR DISEASE (HCC): ICD-10-CM

## 2022-04-28 DIAGNOSIS — Z00.00 MEDICARE ANNUAL WELLNESS VISIT, SUBSEQUENT: Primary | ICD-10-CM

## 2022-04-28 DIAGNOSIS — K76.0 FATTY LIVER: ICD-10-CM

## 2022-04-28 DIAGNOSIS — I10 ESSENTIAL HYPERTENSION: ICD-10-CM

## 2022-04-28 DIAGNOSIS — E78.00 PURE HYPERCHOLESTEROLEMIA: ICD-10-CM

## 2022-04-28 DIAGNOSIS — K76.89 LIVER CYST: ICD-10-CM

## 2022-04-28 DIAGNOSIS — E11.69 TYPE 2 DIABETES MELLITUS WITH OTHER SPECIFIED COMPLICATION, WITHOUT LONG-TERM CURRENT USE OF INSULIN (HCC): ICD-10-CM

## 2022-04-28 DIAGNOSIS — R79.89 LOW VITAMIN D LEVEL: ICD-10-CM

## 2022-04-28 DIAGNOSIS — E83.41 HIGH MAGNESIUM LEVELS: ICD-10-CM

## 2022-04-28 DIAGNOSIS — D72.819 LEUKOPENIA, UNSPECIFIED TYPE: ICD-10-CM

## 2022-04-28 PROCEDURE — G0439 PPPS, SUBSEQ VISIT: HCPCS | Performed by: FAMILY MEDICINE

## 2022-04-28 PROCEDURE — 99214 OFFICE O/P EST MOD 30 MIN: CPT | Performed by: FAMILY MEDICINE

## 2022-04-28 PROCEDURE — 1123F ACP DISCUSS/DSCN MKR DOCD: CPT | Performed by: FAMILY MEDICINE

## 2022-04-28 NOTE — PROGRESS NOTES
Assessment/Plan:       No problem-specific Assessment & Plan notes found for this encounter  Diagnoses and all orders for this visit:    Medicare annual wellness visit, subsequent    Essential hypertension  Comments:   controlled  To follow with the dash diet    Pure hypercholesterolemia  Comments:  gto follow with low fat diet  Orders:  -     Lipid Panel with Direct LDL reflex; Future  -     Hepatic function panel; Future    Type 2 diabetes mellitus with other specified complication, without long-term current use of insulin ([facility] [address] )  Comments:   well controlled  Follow with 1800 calorie diet  Orders:  -     Basic metabolic panel; Future  -     Hemoglobin A1C; Future    Chronic renal impairment, stage 3a (HCC)  Comments:     Stable , avoid NSAID  Peripheral vascular disease ([facility] [address] )  Comments:   asymptomatic  Check arterial Doppler of the lower extremities in 1 year patient to call if she developed claudication    Low vitamin D level  Comments:  corrected    Asymptomatic microscopic hematuria  Comments:  following with urology  Liver cyst  Comments:   liver cysts  Recheck liver ultrasound in 1 year    Fatty liver  Comments:  resolved    Leukopenia, unspecified type  Comments:  corrected    High magnesium levels  Comments:  corrected        Patient Instructions       Medicare Preventive Visit Patient Instructions  Thank you for completing your Welcome to Medicare Visit or Medicare Annual Wellness Visit today  Your next wellness visit will be due in one year (4/29/2023)  The screening/preventive services that you may require over the next 5-10 years are detailed below  Some tests may not apply to you based off risk factors and/or age  Screening tests ordered at today's visit but not completed yet may show as past due  Also, please note that scanned in results may not display below    Preventive Screenings:  Service Recommendations Previous Testing/Comments   Colorectal Cancer Screening  * Colonoscopy    * Fecal Occult Blood Test (FOBT)/Fecal Immunochemical Test (FIT)  * Fecal DNA/Cologuard Test  * Flexible Sigmoidoscopy Age: 54-65 years old   Colonoscopy: every 10 years (may be performed more frequently if at higher risk)  OR  FOBT/FIT: every 1 year  OR  Cologuard: every 3 years  OR  Sigmoidoscopy: every 5 years  Screening may be recommended earlier than age 48 if at higher risk for colorectal cancer  Also, an individualized decision between you and your healthcare provider will decide whether screening between the ages of 74-80 would be appropriate  Colonoscopy: Not on file  FOBT/FIT: Not on file  Cologuard: Not on file  Sigmoidoscopy: Not on file          Breast Cancer Screening Age: 36 years old  Frequency: every 1-2 years  Not required if history of left and right mastectomy Mammogram: 11/03/2021        Cervical Cancer Screening Between the ages of 21-29, pap smear recommended once every 3 years  Between the ages of 33-67, can perform pap smear with HPV co-testing every 5 years  Recommendations may differ for women with a history of total hysterectomy, cervical cancer, or abnormal pap smears in past  Pap Smear: Not on file        Hepatitis C Screening Once for adults born between 1945 and 1965  More frequently in patients at high risk for Hepatitis C Hep C Antibody: 07/09/2020        Diabetes Screening 1-2 times per year if you're at risk for diabetes or have pre-diabetes Fasting glucose: No results in last 5 years   A1C: 6 1 %        Cholesterol Screening Once every 5 years if you don't have a lipid disorder  May order more often based on risk factors  Lipid panel: 01/16/2019          Other Preventive Screenings Covered by Medicare:  1  Abdominal Aortic Aneurysm (AAA) Screening: covered once if your at risk  You're considered to be at risk if you have a family history of AAA    2  Lung Cancer Screening: covers low dose CT scan once per year if you meet all of the following conditions: (1) Age 50-69; (2) No signs or symptoms of lung cancer; (3) Current smoker or have quit smoking within the last 15 years; (4) You have a tobacco smoking history of at least 30 pack years (packs per day multiplied by number of years you smoked); (5) You get a written order from a healthcare provider  3  Glaucoma Screening: covered annually if you're considered high risk: (1) You have diabetes OR (2) Family history of glaucoma OR (3)  aged 48 and older OR (3)  American aged 72 and older  3  Osteoporosis Screening: covered every 2 years if you meet one of the following conditions: (1) You're estrogen deficient and at risk for osteoporosis based off medical history and other findings; (2) Have a vertebral abnormality; (3) On glucocorticoid therapy for more than 3 months; (4) Have primary hyperparathyroidism; (5) On osteoporosis medications and need to assess response to drug therapy  · Last bone density test (DXA Scan): 07/29/2020   5  HIV Screening: covered annually if you're between the age of 15-65  Also covered annually if you are younger than 13 and older than 72 with risk factors for HIV infection  For pregnant patients, it is covered up to 3 times per pregnancy  Immunizations:  Immunization Recommendations   Influenza Vaccine Annual influenza vaccination during flu season is recommended for all persons aged >= 6 months who do not have contraindications   Pneumococcal Vaccine (Prevnar and Pneumovax)  * Prevnar = PCV13  * Pneumovax = PPSV23   Adults 25-60 years old: 1-3 doses may be recommended based on certain risk factors  Adults 72 years old: Prevnar (PCV13) vaccine recommended followed by Pneumovax (PPSV23) vaccine  If already received PPSV23 since turning 65, then PCV13 recommended at least one year after PPSV23 dose     Hepatitis B Vaccine 3 dose series if at intermediate or high risk (ex: diabetes, end stage renal disease, liver disease)   Tetanus (Td) Vaccine - COST NOT COVERED BY MEDICARE PART B Following completion of primary series, a booster dose should be given every 10 years to maintain immunity against tetanus  Td may also be given as tetanus wound prophylaxis  Tdap Vaccine - COST NOT COVERED BY MEDICARE PART B Recommended at least once for all adults  For pregnant patients, recommended with each pregnancy  Shingles Vaccine (Shingrix) - COST NOT COVERED BY MEDICARE PART B  2 shot series recommended in those aged 48 and above     Health Maintenance Due:      Topic Date Due    Hepatitis C Screening  Completed     Immunizations Due:  There are no preventive care reminders to display for this patient  Advance Directives   What are advance directives? Advance directives are legal documents that state your wishes and plans for medical care  These plans are made ahead of time in case you lose your ability to make decisions for yourself  Advance directives can apply to any medical decision, such as the treatments you want, and if you want to donate organs  What are the types of advance directives? There are many types of advance directives, and each state has rules about how to use them  You may choose a combination of any of the following:  · Living will: This is a written record of the treatment you want  You can also choose which treatments you do not want, which to limit, and which to stop at a certain time  This includes surgery, medicine, IV fluid, and tube feedings  · Durable power of  for healthcare [facility]): This is a written record that states who you want to make healthcare choices for you when you are unable to make them for yourself  This person, called a proxy, is usually a family member or a friend  You may choose more than 1 proxy  · Do not resuscitate (DNR) order:  A DNR order is used in case your heart stops beating or you stop breathing  It is a request not to have certain forms of treatment, such as CPR   A DNR order may be included in other types of advance directives  · Medical directive: This covers the care that you want if you are in a coma, near death, or unable to make decisions for yourself  You can list the treatments you want for each condition  Treatment may include pain medicine, surgery, blood transfusions, dialysis, IV or tube feedings, and a ventilator (breathing machine)  · Values history: This document has questions about your views, beliefs, and how you feel and think about life  This information can help others choose the care that you would choose  Why are advance directives important? An advance directive helps you control your care  Although spoken wishes may be used, it is better to have your wishes written down  Spoken wishes can be misunderstood, or not followed  Treatments may be given even if you do not want them  An advance directive may make it easier for your family to make difficult choices about your care  Weight Management   Why it is important to manage your weight:  Being overweight increases your risk of health conditions such as heart disease, high blood pressure, type 2 diabetes, and certain types of cancer  It can also increase your risk for osteoarthritis, sleep apnea, and other respiratory problems  Aim for a slow, steady weight loss  Even a small amount of weight loss can lower your risk of health problems  How to lose weight safely:  A safe and healthy way to lose weight is to eat fewer calories and get regular exercise  You can lose up about 1 pound a week by decreasing the number of calories you eat by 500 calories each day  Healthy meal plan for weight management:  A healthy meal plan includes a variety of foods, contains fewer calories, and helps you stay healthy  A healthy meal plan includes the following:  · Eat whole-grain foods more often  A healthy meal plan should contain fiber  Fiber is the part of grains, fruits, and vegetables that is not broken down by your body   Whole-grain foods are healthy and provide extra fiber in your diet  Some examples of whole-grain foods are whole-wheat breads and pastas, oatmeal, brown rice, and bulgur  · Eat a variety of vegetables every day  Include dark, leafy greens such as spinach, kale, tali greens, and mustard greens  Eat yellow and orange vegetables such as carrots, sweet potatoes, and winter squash  · Eat a variety of fruits every day  Choose fresh or canned fruit (canned in its own juice or light syrup) instead of juice  Fruit juice has very little or no fiber  · Eat low-fat dairy foods  Drink fat-free (skim) milk or 1% milk  Eat fat-free yogurt and low-fat cottage cheese  Try low-fat cheeses such as mozzarella and other reduced-fat cheeses  · Choose meat and other protein foods that are low in fat  Choose beans or other legumes such as split peas or lentils  Choose fish, skinless poultry (chicken or turkey), or lean cuts of red meat (beef or pork)  Before you cook meat or poultry, cut off any visible fat  · Use less fat and oil  Try baking foods instead of frying them  Add less fat, such as margarine, sour cream, regular salad dressing and mayonnaise to foods  Eat fewer high-fat foods  Some examples of high-fat foods include french fries, doughnuts, ice cream, and cakes  · Eat fewer sweets  Limit foods and drinks that are high in sugar  This includes candy, cookies, regular soda, and sweetened drinks  Exercise:  Exercise at least 30 minutes per day on most days of the week  Some examples of exercise include walking, biking, dancing, and swimming  You can also fit in more physical activity by taking the stairs instead of the elevator or parking farther away from stores  Ask your healthcare provider about the best exercise plan for you  © Copyright [employer] 2018 Information is for End User's use only and may not be sold, redistributed or otherwise used for commercial purposes   All illustrations and images included in CareNotes® are the copyrighted property of A [employer] M [employer] Inc  or [address]    to follow up with test results      Orders Placed This Encounter   Procedures    Lipid Panel with Direct LDL reflex     This is a patient instruction: This test requires patient fasting for 10-12 hours or longer  Drinking of black coffee or black tea is acceptable  Standing Status:   Future     Standing Expiration Date:   4/28/2023    Hepatic function panel     This is a patient instruction: This test is non-fasting  Please drink two glasses of water morning of bloodwork  Standing Status:   Future     Standing Expiration Date:   4/28/2023    Basic metabolic panel     This is a patient instruction: Patient fasting for 8 hours or longer recommended  Standing Status:   Future     Standing Expiration Date:   4/28/2023    Hemoglobin A1C     Standing Status:   Future     Standing Expiration Date:   4/28/2023         Subjective:     Patient ID: Riley Johnston is a [de-identified] y o  female      HPI    had bursitis left thigh she was evaluated by Orthopedic she had a injection and she had physical therapy her pain resolved completely   diabetes  Not checking blood sugar at home  Hypertension a regular salt intake denied headache or dizziness  Peripheral vascular disease  Denied claudication  chronic renal insufficiency  Denied edema  test results   abdominal ultrasound   Doppler of the lower extremity   labs on1-20-22   discussed with pt    Review of Systems   Constitutional: Negative for appetite change, chills, diaphoresis and fatigue  HENT: Negative for ear pain, sore throat, trouble swallowing and voice change  Eyes: Negative for visual disturbance  Respiratory: Negative for cough, chest tightness and shortness of breath  Cardiovascular: Negative for chest pain, palpitations and leg swelling  Gastrointestinal: Negative for abdominal pain, blood in stool, constipation, diarrhea and nausea     Endocrine: Negative for polydipsia and polyuria  Genitourinary: Negative for difficulty urinating, dysuria, flank pain, frequency, hematuria, pelvic pain, urgency, vaginal bleeding and vaginal discharge  Musculoskeletal: Negative for arthralgias, back pain, gait problem, myalgias and neck pain  Skin: Negative for rash  Allergic/Immunologic: Negative for food allergies  Neurological: Negative for dizziness, tremors, seizures, syncope, speech difficulty, weakness, light-headedness, numbness and headaches  Hematological: Negative for adenopathy  Does not bruise/bleed easily  Psychiatric/Behavioral: Negative for confusion and dysphoric mood  The patient is not nervous/anxious  Objective:     Physical Exam  Constitutional:       General: She is not in acute distress  Appearance: Normal appearance  She is well-developed  HENT:      Head: Normocephalic and atraumatic  Eyes:      General: No scleral icterus  Conjunctiva/sclera: Conjunctivae normal       Pupils: Pupils are equal, round, and reactive to light  Neck:      Thyroid: No thyromegaly  Vascular: No JVD  Cardiovascular:      Rate and Rhythm: Normal rate and regular rhythm  Pulses: Pulses are weak  Dorsalis pedis pulses are 0 on the right side and 0 on the left side  Posterior tibial pulses are 2+ on the right side and 2+ on the left side  Heart sounds: Normal heart sounds  No murmur heard  Comments: Extremities  No edema  Pulmonary:      Effort: Pulmonary effort is normal       Breath sounds: Normal breath sounds  Abdominal:      Palpations: Abdomen is soft  There is no mass  Tenderness: There is no abdominal tenderness  There is no guarding or rebound  Hernia: No hernia is present  Feet:      Right foot:      Skin integrity: No ulcer, skin breakdown, erythema, warmth, callus or dry skin  Left foot:      Skin integrity: No ulcer, skin breakdown, erythema, warmth, callus or dry skin     Lymphadenopathy: Cervical: No cervical adenopathy  Skin:     Findings: No rash  Neurological:      Mental Status: She is alert and oriented to person, place, and time  Cranial Nerves: No cranial nerve deficit  Motor: No weakness or abnormal muscle tone  Coordination: Coordination normal       Gait: Gait normal    Psychiatric:         Behavior: Behavior normal          Judgment: Judgment normal      Diabetic Foot Exam    Patient's shoes and socks removed  Right Foot/Ankle   Right Foot Inspection  Skin Exam: skin normal and skin intact  No dry skin, no warmth, no callus, no erythema, no maceration, no abnormal color, no pre-ulcer, no ulcer and no callus  Toe Exam: No swelling, no tenderness, erythema and  no right toe deformity    Sensory   Monofilament testing: intact    Vascular  The right DP pulse is 0  The right PT pulse is 2+  Left Foot/Ankle  Left Foot Inspection  Skin Exam: skin normal and skin intact  No dry skin, no warmth, no erythema, no maceration, normal color, no pre-ulcer, no ulcer and no callus  Toe Exam: No swelling, no tenderness, no erythema and no left toe deformity  Sensory   Monofilament testing: intact    Vascular  The left DP pulse is 0  The left PT pulse is 2+       Assign Risk Category  No deformity present  No loss of protective sensation  Weak pulses  Risk: 0

## 2022-04-28 NOTE — PROGRESS NOTES
Assessment and Plan:     Problem List Items Addressed This Visit        Digestive    Fatty liver    Liver cyst       Endocrine    Diabetes ([address] )    Relevant Orders    Basic metabolic panel    Hemoglobin A1C       Cardiovascular and Mediastinum    Essential hypertension    Peripheral vascular disease (HCC)       Genitourinary    Asymptomatic microscopic hematuria    Chronic renal insufficiency       Other    High magnesium levels    Hyperlipidemia    Relevant Orders    Lipid Panel with Direct LDL reflex    Hepatic function panel    Leukopenia    Low vitamin D level      Other Visit Diagnoses     Medicare annual wellness visit, subsequent    -  Primary        BMI Counseling: Body mass index is 26 89 kg/m²  The BMI is above normal  Nutrition recommendations include decreasing portion sizes, moderation in carbohydrate intake, reducing intake of saturated and trans fat and reducing intake of cholesterol  Exercise recommendations include moderate physical activity 150 minutes/week  Rationale for BMI follow-up plan is due to patient being overweight or obese  Depression Screening and Follow-up Plan: Patient was screened for depression during today's encounter  They screened negative with a PHQ-2 score of 0  Preventive health issues were discussed with patient, and age appropriate screening tests were ordered as noted in patient's After Visit Summary  Personalized health advice and appropriate referrals for health education or preventive services given if needed, as noted in patient's After Visit Summary       History of Present Illness:     Patient presents for Medicare Annual Wellness visit    Patient Care Team:  Maegan Waite MD as PCP - MD Zach Smith MD (Gastroenterology)  Jewell Alfonso MD (Urology)  Denilson Moreno MD (Ophthalmology)  Desean Moreno MD (Sports Medicine)     Problem List:     Patient Active Problem List   Diagnosis    Hyperlipidemia    Diabetes [facility])    Chronic renal insufficiency    Fatty liver    Osteoporosis    Diverticulosis    Peripheral vascular disease of lower extremity (HCC)    Multiple renal cysts    Liver cyst    Glaucoma    Cataract    Colonic polyp    Hearing loss    GERD with esophagitis    Essential hypertension    Peripheral vascular disease ([facility] [address] )    Encounter for screening mammogram for breast cancer    Over weight    Cyst of right kidney    Need for immunization against influenza    Leukopenia    Low vitamin D level    Abnormal urine cytology    Asymptomatic microscopic hematuria    High magnesium levels    Chronic right shoulder pain    Encounter for screening mammogram for malignant neoplasm of breast    Acquired complex renal cyst      Past Medical and Surgical History:     Past Medical History:   Diagnosis Date    Arthritis     Chronic problem    Chronic kidney disease     GFR is low    Difficulty walking 2021    heel pain in left foot    [place] archchristie     Right foot    Hypertension     Liver disease 2008    liver cysts    Plantar fasciitis 11/3/20     Past Surgical History:   Procedure Laterality Date    TUBAL LIGATION Bilateral       Family History:     Family History   Problem Relation Age of Onset    Breast cancer Mother 46    Heart attack Father     Coronary artery disease Father     Arthritis Father            [place] Dmitri Heart disease Father            [place] D[place] Colon polyps Brother     No Known Problems Maternal Grandmother     No Known Problems Maternal Grandfather     No Known Problems Paternal Grandmother     No Known Problems Paternal Grandfather     No Known Problems Son     No Known Problems Maternal Aunt     No Known Problems Maternal Aunt     No Known Problems Maternal Aunt     No Known Problems Maternal Aunt     No Known Problems Maternal Aunt       Social History:     Social History     Socioeconomic History    Marital status:      Spouse name: None    Number of children: None    Years of education: None    Highest education level: None   Occupational History    None   Tobacco Use    Smoking status: Former Smoker     Packs/day: 0 25     Years: 15 00     Pack years: 3 75     Types: Cigarettes     Quit date: 10/20/2003     Years since quittin 5    Smokeless tobacco: Never Used    Tobacco comment: I did quit   Vaping Use    Vaping Use: Never used   Substance and Sexual Activity    Alcohol use:  Yes     Alcohol/week: 0 0 standard drinks     Comment: I have a beer or mixed drink when in a restaurant    Drug use: No    Sexual activity: Not Currently   Other Topics Concern    None   Social History Narrative    None     Social Determinants of Health     Financial Resource Strain: Not on file   Food Insecurity: Not on file   Transportation Needs: Not on file   Physical Activity: Not on file   Stress: Not on file   Social Connections: Not on file   Intimate Partner Violence: Not on file   Housing Stability: Not on file      Medications and Allergies:     Current Outpatient Medications   Medication Sig Dispense Refill    ASPERCREME LIDOCAINE EX Apply topically      aspirin (Aspirin 81) 81 mg EC tablet       bimatoprost (LUMIGAN) 0 01 % ophthalmic drops Apply 1 drop to eye Daily      brimonidine (ALPHAGAN P) 0 1 % Apply 1 drop to eye every 12 (twelve) hours      Calcium Carbonate-Vitamin D (CALTRATE 600+D PO) Take 2 tablets by mouth daily       cholecalciferol (VITAMIN D3) 1,000 units tablet Take 1,000 Units by mouth daily      diltiazem (CARDIZEM LA) 240 MG 24 hr tablet Take 1 tablet (240 mg total) by mouth daily 90 tablet 1    ezetimibe (ZETIA) 10 mg tablet Take 1 tablet (10 mg total) by mouth daily 90 tablet 3    Multiple Vitamins-Minerals (CENTRUM SILVER ULTRA WOMENS PO) Take 1 tablet by mouth daily      olmesartan (Benicar) 20 mg tablet Take 1 tablet (20 mg total) by mouth daily 90 tablet 3    rosuvastatin (CRESTOR) 10 MG tablet Take 1 tablet (10 mg total) by mouth daily 90 tablet 3    Acetaminophen (TYLENOL ARTHRITIS EXT RELIEF PO) Take by mouth (Patient not taking: Reported on 12/2/2021 )      Diclofenac Sodium (VOLTAREN) 1 % Apply 2 g topically 4 (four) times a day (Patient not taking: Reported on 1/27/2022 ) 100 g 2     No current facility-administered medications for this visit  Allergies   Allergen Reactions    Lansoprazole Diarrhea    Naproxen     Nsaids      Other reaction(s): renal dysfunction  Category: Adverse Reaction;     Sulfa Antibiotics Hives    Trimethoprim     Amoxicillin Fever and Rash     Category: Allergy;       Immunizations:     Immunization History   Administered Date(s) Administered    COVID-19 MODERNA VACC 0 5 ML IM 01/21/2021, 02/18/2021, 11/29/2021    Fluzone Split Quad 0 25 mL 09/22/2016    INFLUENZA 10/13/2015, 09/22/2016, 09/14/2017, 10/25/2018    Influenza Split 10/17/2013    Influenza Split High Dose Preservative Free IM 09/14/2017    Influenza, high dose seasonal 0 7 mL 10/25/2018, 10/02/2019, 10/14/2020, 10/21/2021    Influenza, seasonal, injectable 09/23/2011, 10/29/2014    Pneumococcal Conjugate 13-Valent 09/14/2017    Pneumococcal Polysaccharide PPV23 11/18/2015    Tdap 08/05/2019      Health Maintenance:         Topic Date Due    Hepatitis C Screening  Completed     There are no preventive care reminders to display for this patient  Medicare Health Risk Assessment:     /78 (BP Location: Left arm, Patient Position: Sitting, Cuff Size: Standard)   Pulse 90   Temp (!) 97 °F (36 1 °C) (Temporal)   Ht 5' 6" (1 676 m)   Wt 75 6 kg (166 lb 9 6 oz)   SpO2 99%   BMI 26 89 kg/m²      Elkin Mari is here for her Subsequent Wellness visit  Health Risk Assessment:   Patient rates overall health as good  Patient feels that their physical health rating is same  Patient is satisfied with their life  Eyesight was rated as slightly worse  Hearing was rated as same   Patient feels that their emotional and mental health rating is same  Patients states they are never, rarely angry  Patient states they are never, rarely unusually tired/fatigued  Pain experienced in the last 7 days has been none  Patient states that she has experienced no weight loss or gain in last 6 months  Following with oph periodically , pt with  glucoma    Depression Screening:   PHQ-2 Score: 0      Fall Risk Screening: In the past year, patient has experienced: no history of falling in past year      Urinary Incontinence Screening:   Patient has not leaked urine accidently in the last six months  Home Safety:  Patient does not have trouble with stairs inside or outside of their home  Patient has working smoke alarms and has working carbon monoxide detector  Home safety hazards include: none  Nutrition:   Current diet is Regular  Medications:   Patient is currently taking over-the-counter supplements  OTC medications include: see medication list  Patient is able to manage medications  Activities of Daily Living (ADLs)/Instrumental Activities of Daily Living (IADLs):   Walk and transfer into and out of bed and chair?: Yes  Dress and groom yourself?: Yes    Bathe or shower yourself?: Yes    Feed yourself? Yes  Do your laundry/housekeeping?: Yes  Manage your money, pay your bills and track your expenses?: Yes  Make your own meals?: Yes    Do your own shopping?: Yes    Previous Hospitalizations:   Any hospitalizations or ED visits within the last 12 months?: No      Advance Care Planning:   Living will: Yes    Durable POA for healthcare:  Yes    Advanced directive: Yes      Cognitive Screening:   Provider or family/friend/caregiver concerned regarding cognition?: No    PREVENTIVE SCREENINGS      Cardiovascular Screening:    General: History Lipid Disorder and Screening Current      Diabetes Screening:     General: History Diabetes and Screening Current      Colorectal Cancer Screening:     General: Screening Current      Breast Cancer Screening:     General: Screening Current      Cervical Cancer Screening:    General: Screening Not Indicated      Osteoporosis Screening:    General: History Osteoporosis and Screening Current      Abdominal Aortic Aneurysm (AAA) Screening:        General: Screening Not Indicated      Lung Cancer Screening:     General: Screening Not Indicated      Hepatitis C Screening:    General: Screening Current      Preventive Screening Comments: She quit smoking 20 years ago   had colonoscopy about 4-5 years ago she is due for repeat colonoscopy this year in July    Screening, Brief Intervention, and Referral to Treatment (SBIRT)    Screening    Typical number of drinks in a week: 2    Single Item Drug Screening:  How often have you used an illegal drug (including marijuana) or a prescription medication for non-medical reasons in the past year? never    Single Item Drug Screen Score: 0  Interpretation: Negative screen for possible drug use disorder    Other Counseling Topics:   Car/seat belt/driving safety, skin self-exam, sunscreen and calcium and vitamin D intake and regular weightbearing exercise   See Derm once a year      Alisson Solano MD

## 2022-04-28 NOTE — PATIENT INSTRUCTIONS
Medicare Preventive Visit Patient Instructions  Thank you for completing your Welcome to Medicare Visit or Medicare Annual Wellness Visit today  Your next wellness visit will be due in one year (4/29/2023)  The screening/preventive services that you may require over the next 5-10 years are detailed below  Some tests may not apply to you based off risk factors and/or age  Screening tests ordered at today's visit but not completed yet may show as past due  Also, please note that scanned in results may not display below  Preventive Screenings:  Service Recommendations Previous Testing/Comments   Colorectal Cancer Screening  * Colonoscopy    * Fecal Occult Blood Test (FOBT)/Fecal Immunochemical Test (FIT)  * Fecal DNA/Cologuard Test  * Flexible Sigmoidoscopy Age: 54-65 years old   Colonoscopy: every 10 years (may be performed more frequently if at higher risk)  OR  FOBT/FIT: every 1 year  OR  Cologuard: every 3 years  OR  Sigmoidoscopy: every 5 years  Screening may be recommended earlier than age 48 if at higher risk for colorectal cancer  Also, an individualized decision between you and your healthcare provider will decide whether screening between the ages of 74-80 would be appropriate  Colonoscopy: Not on file  FOBT/FIT: Not on file  Cologuard: Not on file  Sigmoidoscopy: Not on file          Breast Cancer Screening Age: 36 years old  Frequency: every 1-2 years  Not required if history of left and right mastectomy Mammogram: 11/03/2021        Cervical Cancer Screening Between the ages of 21-29, pap smear recommended once every 3 years  Between the ages of 33-67, can perform pap smear with HPV co-testing every 5 years     Recommendations may differ for women with a history of total hysterectomy, cervical cancer, or abnormal pap smears in past  Pap Smear: Not on file        Hepatitis C Screening Once for adults born between [facility]  More frequently in patients at high risk for Hepatitis C Hep C Antibody: 07/09/2020        Diabetes Screening 1-2 times per year if you're at risk for diabetes or have pre-diabetes Fasting glucose: No results in last 5 years   A1C: 6 1 %        Cholesterol Screening Once every 5 years if you don't have a lipid disorder  May order more often based on risk factors  Lipid panel: 01/16/2019          Other Preventive Screenings Covered by Medicare:  1  Abdominal Aortic Aneurysm (AAA) Screening: covered once if your at risk  You're considered to be at risk if you have a family history of AAA  2  Lung Cancer Screening: covers low dose CT scan once per year if you meet all of the following conditions: (1) Age 50-69; (2) No signs or symptoms of lung cancer; (3) Current smoker or have quit smoking within the last 15 years; (4) You have a tobacco smoking history of at least 30 pack years (packs per day multiplied by number of years you smoked); (5) You get a written order from a healthcare provider  3  Glaucoma Screening: covered annually if you're considered high risk: (1) You have diabetes OR (2) Family history of glaucoma OR (3)  aged 48 and older OR (3)  American aged 72 and older  3  Osteoporosis Screening: covered every 2 years if you meet one of the following conditions: (1) You're estrogen deficient and at risk for osteoporosis based off medical history and other findings; (2) Have a vertebral abnormality; (3) On glucocorticoid therapy for more than 3 months; (4) Have primary hyperparathyroidism; (5) On osteoporosis medications and need to assess response to drug therapy  · Last bone density test (DXA Scan): 07/29/2020   5  HIV Screening: covered annually if you're between the age of 15-65  Also covered annually if you are younger than 13 and older than 72 with risk factors for HIV infection  For pregnant patients, it is covered up to 3 times per pregnancy      Immunizations:  Immunization Recommendations   Influenza Vaccine Annual influenza vaccination during flu season is recommended for all persons aged >= 6 months who do not have contraindications   Pneumococcal Vaccine (Prevnar and Pneumovax)  * Prevnar = PCV13  * Pneumovax = PPSV23   Adults 25-60 years old: 1-3 doses may be recommended based on certain risk factors  Adults 72 years old: Prevnar (PCV13) vaccine recommended followed by Pneumovax (PPSV23) vaccine  If already received PPSV23 since turning 65, then PCV13 recommended at least one year after PPSV23 dose  Hepatitis B Vaccine 3 dose series if at intermediate or high risk (ex: diabetes, end stage renal disease, liver disease)   Tetanus (Td) Vaccine - COST NOT COVERED BY MEDICARE PART B Following completion of primary series, a booster dose should be given every 10 years to maintain immunity against tetanus  Td may also be given as tetanus wound prophylaxis  Tdap Vaccine - COST NOT COVERED BY MEDICARE PART B Recommended at least once for all adults  For pregnant patients, recommended with each pregnancy  Shingles Vaccine (Shingrix) - COST NOT COVERED BY MEDICARE PART B  2 shot series recommended in those aged 48 and above     Health Maintenance Due:      Topic Date Due    Hepatitis C Screening  Completed     Immunizations Due:  There are no preventive care reminders to display for this patient  Advance Directives   What are advance directives? Advance directives are legal documents that state your wishes and plans for medical care  These plans are made ahead of time in case you lose your ability to make decisions for yourself  Advance directives can apply to any medical decision, such as the treatments you want, and if you want to donate organs  What are the types of advance directives? There are many types of advance directives, and each state has rules about how to use them  You may choose a combination of any of the following:  · Living will: This is a written record of the treatment you want   You can also choose which treatments you do not want, which to limit, and which to stop at a certain time  This includes surgery, medicine, IV fluid, and tube feedings  · Durable power of  for healthcare [place] [facility]): This is a written record that states who you want to make healthcare choices for you when you are unable to make them for yourself  This person, called a proxy, is usually a family member or a friend  You may choose more than 1 proxy  · Do not resuscitate (DNR) order:  A DNR order is used in case your heart stops beating or you stop breathing  It is a request not to have certain forms of treatment, such as CPR  A DNR order may be included in other types of advance directives  · Medical directive: This covers the care that you want if you are in a coma, near death, or unable to make decisions for yourself  You can list the treatments you want for each condition  Treatment may include pain medicine, surgery, blood transfusions, dialysis, IV or tube feedings, and a ventilator (breathing machine)  · Values history: This document has questions about your views, beliefs, and how you feel and think about life  This information can help others choose the care that you would choose  Why are advance directives important? An advance directive helps you control your care  Although spoken wishes may be used, it is better to have your wishes written down  Spoken wishes can be misunderstood, or not followed  Treatments may be given even if you do not want them  An advance directive may make it easier for your family to make difficult choices about your care  Weight Management   Why it is important to manage your weight:  Being overweight increases your risk of health conditions such as heart disease, high blood pressure, type 2 diabetes, and certain types of cancer  It can also increase your risk for osteoarthritis, sleep apnea, and other respiratory problems  Aim for a slow, steady weight loss   Even a small amount of weight loss can lower your risk of health problems  How to lose weight safely:  A safe and healthy way to lose weight is to eat fewer calories and get regular exercise  You can lose up about 1 pound a week by decreasing the number of calories you eat by 500 calories each day  Healthy meal plan for weight management:  A healthy meal plan includes a variety of foods, contains fewer calories, and helps you stay healthy  A healthy meal plan includes the following:  · Eat whole-grain foods more often  A healthy meal plan should contain fiber  Fiber is the part of grains, fruits, and vegetables that is not broken down by your body  Whole-grain foods are healthy and provide extra fiber in your diet  Some examples of whole-grain foods are whole-wheat breads and pastas, oatmeal, brown rice, and bulgur  · Eat a variety of vegetables every day  Include dark, leafy greens such as spinach, kale, tali greens, and mustard greens  Eat yellow and orange vegetables such as carrots, sweet potatoes, and winter squash  · Eat a variety of fruits every day  Choose fresh or canned fruit (canned in its own juice or light syrup) instead of juice  Fruit juice has very little or no fiber  · Eat low-fat dairy foods  Drink fat-free (skim) milk or 1% milk  Eat fat-free yogurt and low-fat cottage cheese  Try low-fat cheeses such as mozzarella and other reduced-fat cheeses  · Choose meat and other protein foods that are low in fat  Choose beans or other legumes such as split peas or lentils  Choose fish, skinless poultry (chicken or turkey), or lean cuts of red meat (beef or pork)  Before you cook meat or poultry, cut off any visible fat  · Use less fat and oil  Try baking foods instead of frying them  Add less fat, such as margarine, sour cream, regular salad dressing and mayonnaise to foods  Eat fewer high-fat foods  Some examples of high-fat foods include french fries, doughnuts, ice cream, and cakes  · Eat fewer sweets    Limit foods and drinks that are high in sugar  This includes candy, cookies, regular soda, and sweetened drinks  Exercise:  Exercise at least 30 minutes per day on most days of the week  Some examples of exercise include walking, biking, dancing, and swimming  You can also fit in more physical activity by taking the stairs instead of the elevator or parking farther away from stores  Ask your healthcare provider about the best exercise plan for you  © Copyright "[employer] 2018 Information is for End User's use only and may not be sold, redistributed or otherwise used for commercial purposes   All illustrations and images included in CareNotes® are the copyrighted property of [employer]  or [facility] Shruti Paul    to follow up with test results

## 2022-05-07 ENCOUNTER — APPOINTMENT (OUTPATIENT)
Dept: RADIOLOGY | Facility: CLINIC | Age: 82
End: 2022-05-07
Payer: MEDICARE

## 2022-05-07 ENCOUNTER — OFFICE VISIT (OUTPATIENT)
Dept: OBGYN CLINIC | Facility: CLINIC | Age: 82
End: 2022-05-07
Payer: MEDICARE

## 2022-05-07 VITALS
WEIGHT: 170 LBS | BODY MASS INDEX: 27.32 KG/M2 | HEART RATE: 85 BPM | HEIGHT: 66 IN | DIASTOLIC BLOOD PRESSURE: 79 MMHG | SYSTOLIC BLOOD PRESSURE: 162 MMHG

## 2022-05-07 DIAGNOSIS — S39.92XA INJURY OF LOW BACK, INITIAL ENCOUNTER: ICD-10-CM

## 2022-05-07 DIAGNOSIS — S39.92XA INJURY OF LOW BACK, INITIAL ENCOUNTER: Primary | ICD-10-CM

## 2022-05-07 PROCEDURE — 99214 OFFICE O/P EST MOD 30 MIN: CPT | Performed by: FAMILY MEDICINE

## 2022-05-07 PROCEDURE — 72100 X-RAY EXAM L-S SPINE 2/3 VWS: CPT

## 2022-05-07 NOTE — PROGRESS NOTES
[facility] SPECIALISTS [place] [id number]  [facility] [address] [address]  [phone]      Chief Complaint:  Chief Complaint   Patient presents with    Lower Back - Pain       Vitals:  /79   Pulse 85   Ht 5' 6" (1 676 m)   Wt 77 1 kg (170 lb)   BMI 27 44 kg/m²     The following portions of the patient's history were reviewed and updated as appropriate: allergies, current medications, past family history, past medical history, past social history, past surgical history, and problem list       Subjective:   Patient ID: Gianfranco Childers is a [de-identified] y o  female  Here for f/u lower back pain  Hx of LBP, MRI showed disc protrusion  Hx of CKD- cant take NSAIDS  Taking tylenol- not helping much  LBP started 2 wks ago when pulled a case of water on the counter and felt back go out  Hx of spinal stenosis  Denies hx of CA or change in bowel/bladder  Spasms  No radiation of pain  Denies n/t  Worse with sitting  Using moist heat  Butt exercises/stretching        Review of Systems   Constitutional: Negative for fatigue and fever  Respiratory: Negative for shortness of breath  Cardiovascular: Negative for chest pain  Gastrointestinal: Negative for abdominal pain and nausea  Genitourinary: Negative for dysuria  Musculoskeletal: Positive for back pain  Skin: Negative for rash and wound  Neurological: Negative for weakness and headaches  Objective:  Back Exam     Tenderness   The patient is experiencing no tenderness  Range of Motion   The patient has normal back ROM  Tests   Straight leg raise right: negative  Straight leg raise left: positive    Comments:  Pain with ext/rot B/ Lat flex R            Physical Exam  Vitals and nursing note reviewed  Constitutional:       Appearance: Normal appearance  She is well-developed  HENT:      Head: Normocephalic        Mouth/Throat:      Mouth: Mucous membranes are moist    Eyes:      Extraocular Movements: Extraocular movements intact  Cardiovascular:      Rate and Rhythm: Normal rate and regular rhythm  Heart sounds: Normal heart sounds  Pulmonary:      Effort: Pulmonary effort is normal       Breath sounds: Normal breath sounds  Abdominal:      General: Bowel sounds are normal       Palpations: Abdomen is soft  Musculoskeletal:      Cervical back: Normal range of motion  Lumbar back: Positive left straight leg raise test  Negative right straight leg raise test    Skin:     General: Skin is warm and dry  Neurological:      General: No focal deficit present  Mental Status: She is alert and oriented to person, place, and time  Psychiatric:         Mood and Affect: Mood normal          Behavior: Behavior normal          Thought Content: Thought content normal          XR-  L spine: Mod DJD, mild spondylisthesis L5/S1      Assessment/Plan:  Assessment/Plan   Diagnoses and all orders for this visit:    Injury of low back, initial encounter  -     XR spine lumbar 2 or 3 views injury; Future        Return in about 6 weeks (around 6/18/2022) for Recheck       Panchito Mejia MD

## 2022-05-07 NOTE — PATIENT INSTRUCTIONS
F/u 6 wks  Begin physical therapy  Icing/heat/OTC pain meds as needed  Home exercises  Lower Back Exercises   WHAT YOU NEED TO KNOW:   Lower back exercises help heal and strengthen your back muscles to prevent another injury  Ask your healthcare provider if you need to see a physical therapist for more advanced exercises  DISCHARGE INSTRUCTIONS:   Return to the emergency department if:   · You have severe pain that prevents you from moving  Contact your healthcare provider if:   · Your pain becomes worse  · You have new pain  · You have questions or concerns about your condition or care  Do lower back exercises safely:   · Do the exercises on a mat or firm surface  (not on a bed) to support your spine and prevent low back pain  · Move slowly and smoothly  Avoid fast or jerky motions  · Breathe normally  Do not hold your breath  · Stop if you feel pain  It is normal to feel some discomfort at first  Regular exercise will help decrease your discomfort over time  Lower back exercises: Your healthcare provider may recommend that you do back exercises 10 to 30 minutes each day  He may also recommend that you do exercises 1 to 3 times each day  Ask your healthcare provider which exercises are best for you and how often to do them  · Ankle pumps:  Lie on your back  Move your foot up (with your toes pointing toward your head)  Then, move your foot down (with your toes pointing away from you)  Repeat this exercise 10 times on each side  · Heel slides:  Lie on your back  Slowly bend one leg and then straighten it  Next, bend the other leg and then straighten it  Repeat 10 times on each side  · Pelvic tilt:  Lie on your back with your knees bent and feet flat on the floor  Place your arms in a relaxed position beside your body  Tighten the muscles of your abdomen and flatten your back against the floor  Hold for 5 seconds  Repeat 5 times           · Back stretch:  Lie on your back with your hands behind your head  Bend your knees and turn the lower half of your body to one side  Hold this position for 10 seconds  Repeat 3 times on each side  · Straight leg raises:  Lie on your back with one leg straight  Bend the other knee  Tighten your abdomen and then slowly lift the straight leg up about 6 to 12 inches off the floor  Hold for 1 to 5 seconds  Lower your leg slowly  Repeat 10 times on each leg  · Knee-to-chest:  Lie on your back with your knees bent and feet flat on the floor  Pull one of your knees toward your chest and hold it there for 5 seconds  Return your leg to the starting position  Lift the other knee toward your chest and hold for 5 seconds  Do this 5 times on each side  · Cat and camel:  Place your hands and knees on the floor  Arch your back upward toward the ceiling and lower your head  Round out your spine as much as you can  Hold for 5 seconds  Lift your head upward and push your chest downward toward the floor  Hold for 5 seconds  Do 3 sets or as directed  · Wall squats:  Stand with your back against a wall  Tighten the muscles of your abdomen  Slowly lower your body until your knees are bent at a 45 degree angle  Hold this position for 5 seconds  Slowly move back up to a standing position  Repeat 10 times  · Curl up:  Lie on your back with your knees bent and feet flat on the floor  Place your hands, palms down, underneath the curve in your lower back  Next, with your elbows on the floor, lift your shoulders and chest 2 to 3 inches  Keep your head in line with your shoulders  Hold this position for 5 seconds  When you can do this exercise without pain for 10 to 15 seconds, you may add a rotation  While your shoulders and chest are lifted off the ground, turn slightly to the left and hold  Repeat on the other side  · Bird dog:  Place your hands and knees on the floor   Keep your wrists directly below your shoulders and your knees directly below your hips  Pull your belly button in toward your spine  Do not flatten or arch your back  Tighten your abdominal muscles  Raise one arm straight out so that it is aligned with your head  Next, raise the leg opposite your arm  Hold this position for 15 seconds  Lower your arm and leg slowly and change sides  Do 5 sets  © Copyright [employer] 2022 Information is for End User's use only and may not be sold, redistributed or otherwise used for commercial purposes  All illustrations and images included in CareNotes® are the copyrighted property of [employer]  or [facility][note 3] Shruti Paul   The above information is an  only  It is not intended as medical advice for individual conditions or treatments  Talk to your doctor, nurse or pharmacist before following any medical regimen to see if it is safe and effective for you

## 2022-05-09 ENCOUNTER — TELEPHONE (OUTPATIENT)
Dept: OBGYN CLINIC | Facility: HOSPITAL | Age: 82
End: 2022-05-09

## 2022-05-09 NOTE — TELEPHONE ENCOUNTER
Pt sees Dr Ag Javed from physical therapy is calling stating that the patient was seen on 5-7 and the pt states that she was told that she needs physical therapy, [facility] states that they do not have a script    #[phone]  Fax# [phone]

## 2022-05-11 ENCOUNTER — EVALUATION (OUTPATIENT)
Dept: PHYSICAL THERAPY | Facility: REHABILITATION | Age: 82
End: 2022-05-11
Payer: MEDICARE

## 2022-05-11 DIAGNOSIS — M54.50 ACUTE BILATERAL LOW BACK PAIN, UNSPECIFIED WHETHER SCIATICA PRESENT: Primary | ICD-10-CM

## 2022-05-11 PROCEDURE — 97110 THERAPEUTIC EXERCISES: CPT

## 2022-05-11 PROCEDURE — 97161 PT EVAL LOW COMPLEX 20 MIN: CPT

## 2022-05-11 NOTE — PROGRESS NOTES
PT Evaluation     Today's date: 2022  Patient name: Jarrod Birmingham  : 1940  MRN: [id number]  Referring provider: No ref  provider found  Dx:   Encounter Diagnosis     ICD-10-CM    1  Acute bilateral low back pain, unspecified whether sciatica present  M54 50        Start Time: 1141  Stop Time: 1220  Total time in clinic (min): 39 minutes    Assessment  Assessment details: Jarrod Birmingham is a [de-identified]y o  year old female with a referred dx of low back pain  Pt presents with pain with functional activities such as sitting and standing following prolonged periods of sitting  Upon further clinical testing, pt demonstrates decreased hip/core strength, restricted quad/hamstring muscle length, and decreased L/s ROM in all planes  Pt would benefit from skilled OP PT to address these, and the below impairments in order to optimize outcomes and promote return to functional baseline  Educated pt on performing HEP during prolonged periods of sitting prior to standing up  Pt experiences most pain when standing following prolonged sitting  Pt verbalizes understanding  Impairments: abnormal or restricted ROM, abnormal movement, activity intolerance, impaired physical strength, lacks appropriate home exercise program, pain with function, poor posture  and poor body mechanics    Goals    Short Term Goals: In 4 weeks, the patient will:  1  Decrease worst pain by 2 points for improved QOL  2  Improve hip strength by 1/2 grade for improved LE fx   3  Supervision with HEP for self care  Long Term Goals: In 8 weeks, the patient will:  1  Report less pain with sitting for improved tolerance to fx positioning  2  FOTO to greater than predicted value  3  Independent with comprehensive HEP upon discharge    4  Report less pain upon standing following prolonged sitting to meet pt's goal      Plan  Patient would benefit from: skilled physical therapy  Referral necessary: No  Planned modality interventions: cryotherapy  Planned therapy interventions: activity modification, ADL retraining, manual therapy, abdominal trunk stabilization, neuromuscular re-education, patient education, postural training, strengthening, stretching, therapeutic activities, therapeutic exercise, home exercise program, functional ROM exercises, graded exercise, flexibility and body mechanics training  Frequency: 2x week  Duration in weeks: 8  Treatment plan discussed with: patient        Subjective Evaluation    History of Present Illness  Mechanism of injury: Pt reports 2-week hx of low back pain after lifting case of water at [employer]  Pt reports felt back "give out " Seen by ortho, imaging revealed stenosis and disc protrusion  Pt notes pain is only with prolonged sitting, especially with couch at home  Pain improved only with biofreeze  Pt denies numbness/tingling/BB changes/changes in sleep  Pain  Current pain ratin  At best pain ratin  At worst pain ratin  Quality: dull ache  Alleviating factors: biofreeze  Aggravating factors: sitting  Progression: improved      Diagnostic Tests  X-ray: abnormal  MRI studies: abnormal  Treatments  Previous treatment: medication  Patient Goals  Patient goals for therapy: decreased pain, increased motion, increased strength and independence with ADLs/IADLs (get up off couch)          Objective     OBJECTIVE:    Postural Findings:     Lumbar Spine  Inc Lordosis  Neutral X Dec Lordosis   Pelvis  Anterior Tilt X Neutral  Posterior Tilt   Iliac Crest  L elevated X Neutral  R elevated   Lateral Shift  Right  Left X None     Strength and ROM evaluated B from a regional biomechanical perspective and values relevant to this episode recorded in tables below      ROM:   Joint / Motion  Right  Left    Lumbar Flexion  50% limited    Lumbar Extension  25% limited     Lumbar Sidebending  25% limited 25% limited    Lumbar Rotation 50% limited 50% limited   Hip ER  [facility]    Hip IR  [facility] WFL     Repeated Movements: NT      LE MMT Strength:  Test Action RIGHT  LEFT   Hip Flexion 4/5 4/5   Hip Abduction 4-/5 4-/5   Hip Extension 4-/5 4-/5   Knee Extension 5/5 5/5   Knee Flexion 5/5 5/5   Ankle DF 5/5 5/5   Ankle PF 5/5 5/5     Additional Assessments:  Pain with palpation noted: none, not TTP along paraspinals   Passive intervertebral motion: WFL  Joint Mobility: WFL, no pain    Lower quarter screen: unremarkable     Special Tests:  Lumbar Specific and Neural Tension                                                                            Test / Measure  Right 5/11/2022 Left 5/11/2022   Straight Leg raise - -   Crossed straight leg raise - -   Slump test - -   Prone instability test - -   90/90 Hamstring test + POS + POS   Ely's test + POS + POS   JOCELYN - -   FADIR - -     SI Joint Screen: WFL                      Precautions: Hard of hearing, HTN      Manuals 5/11            Hamstring/ quad stretch                                                    Neuro Re-Ed             TrA seated 5"x5 hep            TrA+ bridge             Mini squats             DL             Paloff press             Resisted rotation             Seated resist L/s ext                           Ther Ex             NS             L/s flexion 5"x5 seated hep             L/s rot 5"x5 B seated hep            STS             Seated hamstring stretch             Stand hip flexor stretch                                       Ther Activity             Side stepping             Step ups             Gait Training                                       Modalities

## 2022-05-17 ENCOUNTER — OFFICE VISIT (OUTPATIENT)
Dept: PHYSICAL THERAPY | Facility: REHABILITATION | Age: 82
End: 2022-05-17
Payer: MEDICARE

## 2022-05-17 DIAGNOSIS — M54.50 ACUTE BILATERAL LOW BACK PAIN, UNSPECIFIED WHETHER SCIATICA PRESENT: Primary | ICD-10-CM

## 2022-05-17 PROCEDURE — 97112 NEUROMUSCULAR REEDUCATION: CPT

## 2022-05-17 PROCEDURE — 97110 THERAPEUTIC EXERCISES: CPT

## 2022-05-17 NOTE — PROGRESS NOTES
Daily Note     Today's date: 2022  Patient name: Jarrod Birmingham  : 1940  MRN: [id number]  Referring provider: No ref  provider found  Dx:   Encounter Diagnosis     ICD-10-CM    1  Acute bilateral low back pain, unspecified whether sciatica present  M54 50        Start Time: 923  Stop Time: 1016  Total time in clinic (min): 53 minutes    Subjective: Pt reports no change in pain  Pain occurs with standing following prolonged periods of sitting on low couch or hard surface  Pt notes shes performing HEP, but only repeated L/s flexion when going to stand  Objective: See treatment diary below      Assessment: Tolerated treatment well  Pt able to demonstrate all exercises with good technique and no pain reports throughout  Educated pt on also performing L/s rotation prior to standing for gentle ROM and to decrease pain  Pt reports LE and low back fatigue post-tx and no pain  Monitor response to initial exercises at f/u  Patient would benefit from continued PT  1:1 with Dave Galvan DPT for entirety of tx  Plan: Continue per plan of care        Precautions: Hard of hearing, HTN      Manuals            Hamstring/ quad stretch  B hamstring 30"x2                                                  Neuro Re-Ed             TrA seated 5"x5 hep 5"x10           TrA+ bridge  x10           Mini squats  2x10           DL  2x10 5# rack pull           Paloff press             Resisted rotation  x10 B 5#           Seated resist L/s ext   2x10 8#           Reverse hypers             Ther Ex             NS  10' L5           L/s flexion 5"x5 seated hep             L/s rot 5"x5 B seated hep            STS  2x10 3#           Seated hamstring stretch  30" B           Stand hip flexor stretch  30" B           Pball roll outs  5"x10                        Ther Activity             Side stepping             Step ups             Gait Training                                       Modalities

## 2022-05-19 ENCOUNTER — OFFICE VISIT (OUTPATIENT)
Dept: PHYSICAL THERAPY | Facility: REHABILITATION | Age: 82
End: 2022-05-19
Payer: MEDICARE

## 2022-05-19 DIAGNOSIS — M54.50 ACUTE BILATERAL LOW BACK PAIN, UNSPECIFIED WHETHER SCIATICA PRESENT: Primary | ICD-10-CM

## 2022-05-19 PROCEDURE — 97112 NEUROMUSCULAR REEDUCATION: CPT

## 2022-05-19 PROCEDURE — 97110 THERAPEUTIC EXERCISES: CPT

## 2022-05-19 NOTE — PROGRESS NOTES
Daily Note     Today's date: 2022  Patient name: Angelo Clifton  : 1940  MRN: [id number]  Referring provider: Maria L Ball MD  Dx:   Encounter Diagnosis     ICD-10-CM    1  Acute bilateral low back pain, unspecified whether sciatica present  M54 50        Start Time: 923  Stop Time: 946  Total time in clinic (min): 23 minutes    Subjective: Pt reports she tried L/s ROM after sitting on couch for 1 hour, and had no pain upon standing this time  She denies an pain or soreness following first tx  Slight "ache" in upper back to start, most likely due to gardening yesterday  Objective: See treatment diary below      Assessment: Tolerated treatment well  Pt reports no pain throughout session with exercises, and notes "ache" in upper back improved with movement  Educated pt on continuing to perform HEP following periods of prolonged sitting prior to standing for ongoing relief, pt verbalizes understanding  Continue to progress as tolerated  Patient would benefit from continued PT  1:1 with Missy Acharya DPT for entirety of tx  Plan: Continue per plan of care        Precautions: Hard of hearing, HTN      Manuals           Hamstring/ quad stretch  B hamstring 30"x2 B hamstring 30"x2                                                 Neuro Re-Ed             TrA seated 5"x5 hep 5"x10 5"x5          TrA+ bridge  x10 x10 5#          Mini squats  2x10 x10          DL  2x10 5# rack pull 2x10 5# rack pull          Paloff press             Resisted rotation  x10 B 5# x10 B 5#          Seated resist L/s ext   2x10 8# 2x10 8#          Reverse hypers             Ther Ex             NS  10' L5 10' L5          L/s flexion 5"x5 seated hep             L/s rot 5"x5 B seated hep            STS  2x10 3# 2x10 5#          Seated hamstring stretch  30" B 30" B          Stand hip flexor stretch  30" B 30" B          Pball roll outs  5"x10 5"x10                       Ther Activity             Side stepping Step ups             Gait Training                                       Modalities

## 2022-05-23 ENCOUNTER — OFFICE VISIT (OUTPATIENT)
Dept: PHYSICAL THERAPY | Facility: REHABILITATION | Age: 82
End: 2022-05-23
Payer: MEDICARE

## 2022-05-23 DIAGNOSIS — M54.50 ACUTE BILATERAL LOW BACK PAIN, UNSPECIFIED WHETHER SCIATICA PRESENT: Primary | ICD-10-CM

## 2022-05-23 PROCEDURE — 97140 MANUAL THERAPY 1/> REGIONS: CPT

## 2022-05-23 PROCEDURE — 97110 THERAPEUTIC EXERCISES: CPT

## 2022-05-23 PROCEDURE — 97112 NEUROMUSCULAR REEDUCATION: CPT

## 2022-05-23 NOTE — PROGRESS NOTES
Daily Note     Today's date: 2022  Patient name: Duc Mosquera  : 1940  MRN: [id number]  Referring provider: No ref  provider found  Dx:   Encounter Diagnosis     ICD-10-CM    1  Acute bilateral low back pain, unspecified whether sciatica present  M54 50                   Subjective: Pt reports her exercises have been helpful at home with prolonged periods of sitting  Currently reports very mild LBP upon arrival to treatment  Objective: See treatment diary below      Assessment: Tolerated treatment well  Patient would benefit from continued PT  Mod cuing for proper form with exercise completion t/o treatment with fair carryover  Pt denies pain, notes only one "twinge" of pain on R side of low back during rollouts with SB, which is momentary  Pt unable to achieve appropriate hip flexor stretch in standing with max verbal and manual cuing this visit  Stretch performed manually on EOT with pt performing SKTC to avoid lumbar extension or pain with + response  Continue to progress in upcoming visits as able  Plan: Continue per plan of care        Precautions: Hard of hearing, HTN      Manuals          Hamstring/ quad stretch  B hamstring 30"x2 B hamstring 30"x2 B hamstring 30"x2                                                Neuro Re-Ed             TrA seated 5"x5 hep 5"x10 5"x5 5"x10         TrA+ bridge  x10 x10 5# x15 5#         Mini squats  2x10 x10 2x10         DL  2x10 5# rack pull 2x10 5# rack pull 2x10 5#  Rack pull         Paloff press             Resisted rotation  x10 B 5# x10 B 5# x10 B         Seated resist L/s ext   2x10 8# 2x10 8#          Reverse hypers             Ther Ex             NS  10' L5 10' L5 10' L5         L/s flexion 5"x5 seated hep             L/s rot 5"x5 B seated hep            STS  2x10 3# 2x10 5# 2x10 5#         Seated hamstring stretch  30" B 30" B 30" B         Stand hip flexor stretch  30" B 30" B Manual  EOT c SKTC         Pball roll outs 5"x10 5"x10 5"x10                      Ther Activity             Side stepping             Step ups             Gait Training                                       Modalities

## 2022-05-25 ENCOUNTER — OFFICE VISIT (OUTPATIENT)
Dept: PHYSICAL THERAPY | Facility: REHABILITATION | Age: 82
End: 2022-05-25
Payer: MEDICARE

## 2022-05-25 DIAGNOSIS — M54.50 ACUTE BILATERAL LOW BACK PAIN, UNSPECIFIED WHETHER SCIATICA PRESENT: Primary | ICD-10-CM

## 2022-05-25 PROCEDURE — 97530 THERAPEUTIC ACTIVITIES: CPT

## 2022-05-25 PROCEDURE — 97110 THERAPEUTIC EXERCISES: CPT

## 2022-05-25 PROCEDURE — 97112 NEUROMUSCULAR REEDUCATION: CPT

## 2022-05-25 NOTE — PROGRESS NOTES
Daily Note     Today's date: 2022  Patient name: Dante Basurto  : 1940  MRN: [id number]  Referring provider: No ref  provider found  Dx:   Encounter Diagnosis     ICD-10-CM    1  Acute bilateral low back pain, unspecified whether sciatica present  M54 50        Start Time: 947  Stop Time: 1040  Total time in clinic (min): 53 minutes    Subjective: Pt reports continued improvement  She notes first instance of standing from couch without performing L/s ROM exercises and did not report pain  Pt reports low back "ache" from carrying purse in [facility] at [facility]  Objective: See treatment diary below      Assessment: Tolerated treatment well  Pt reports slight R low back "ache" during table exercises, relieved with pball roll outs and LE passive and active stretching  Pt able to progress several exercises without pain increase  Added suitcase carry due to difficulty carrying purse, educated on switching hands as low back becomes fatigued, pt verbalizes understanding  FOTO updated today  Patient would benefit from continued PT  1:1 with Leidy White DPT for entirety of tx  Plan: Continue per plan of care        Precautions: Hard of hearing, HTN      Manuals         Hamstring/ quad stretch  B hamstring 30"x2 B hamstring 30"x2 B hamstring 30"x2 CM 30"x2  Ea B                                               Neuro Re-Ed             TrA seated 5"x5 hep 5"x10 5"x5 5"x10 10"x5        TrA+ bridge  x10 x10 5# x15 5# 2x10 5#        Mini squats  2x10 x10 2x10 x10        DL  2x10 5# rack pull 2x10 5# rack pull 2x10 5#  Rack pull 3x6 15# KB rack pull        Paloff press             Resisted rotation  x10 B 5# x10 B 5# x10 B x10 B 5"#        Seated resist L/s ext   2x10 8# 2x10 8#          Reverse hypers             Ther Ex             NS  10' L5 10' L5 10' L5 10' L5        L/s flexion 5"x5 seated hep             L/s rot 5"x5 B seated hep            STS  2x10 3# 2x10 5# 2x10 5# 2x10 5# Seated hamstring stretch  30" B 30" B 30" B 30" B        Stand hip flexor stretch  30" B 30" B Manual  EOT c SKTC 30" B        Pball roll outs  5"x10 5"x10 5"x10 5"x10                     Ther Activity             Side stepping             Step ups             Suitcase carry     30"x2 B 10#        Gait Training                                       Modalities

## 2022-06-01 ENCOUNTER — OFFICE VISIT (OUTPATIENT)
Dept: PHYSICAL THERAPY | Facility: REHABILITATION | Age: 82
End: 2022-06-01
Payer: MEDICARE

## 2022-06-01 DIAGNOSIS — M54.50 ACUTE BILATERAL LOW BACK PAIN, UNSPECIFIED WHETHER SCIATICA PRESENT: Primary | ICD-10-CM

## 2022-06-01 PROCEDURE — 97112 NEUROMUSCULAR REEDUCATION: CPT

## 2022-06-01 PROCEDURE — 97110 THERAPEUTIC EXERCISES: CPT

## 2022-06-01 PROCEDURE — 97530 THERAPEUTIC ACTIVITIES: CPT

## 2022-06-01 NOTE — PROGRESS NOTES
Daily Note     Today's date: 2022  Patient name: Patito Tilley  : 1940  MRN: [id number]  Referring provider: Gianfranco Munoz*  Dx:   Encounter Diagnosis     ICD-10-CM    1  Acute bilateral low back pain, unspecified whether sciatica present  M54 51                   Subjective: Pt reports some improvement overall, has occasional mild pain into legs, but frequency and intensity has decreased  Objective: See treatment diary below      Assessment: Tolerated treatment well  Patient would benefit from continued PT   Pt  able to complete all exercises with no increase in pain during or after session  Pt able to progress some exercises this session without complaint  Pt  1:1 with PTA for entirety  Plan: Continue per plan of care        Precautions: Hard of hearing, HTN      Manuals        Hamstring/ quad stretch  B hamstring 30"x2 B hamstring 30"x2 B hamstring 30"x2 CM 30"x2  Ea B KP 3x30" ea b/l                                              Neuro Re-Ed             TrA seated 5"x5 hep 5"x10 5"x5 5"x10 10"x5 10x5"       TrA+ bridge  x10 x10 5# x15 5# 2x10 5# 2x10 5#       Mini squats  2x10 x10 2x10 x10 10x       DL  2x10 5# rack pull 2x10 5# rack pull 2x10 5#  Rack pull 3x6 15# KB rack pull 2x10 15# KB rack pull       Paloff press             Resisted rotation  x10 B 5# x10 B 5# x10 B x10 B 5"# 10x ea b/l 5#       Seated resist L/s ext   2x10 8# 2x10 8#          Reverse hypers             Ther Ex             NS  10' L5 10' L5 10' L5 10' L5 10' L5       L/s flexion 5"x5 seated hep             L/s rot 5"x5 B seated hep            STS  2x10 3# 2x10 5# 2x10 5# 2x10 5# 2x10 5#       Seated hamstring stretch  30" B 30" B 30" B 30" B 10x5" b/l       Stand hip flexor stretch  30" B 30" B Manual  EOT c SKTC 30" B 3x20" b/l       Pball roll outs  5"x10 5"x10 5"x10 5"x10 10x5"                    Ther Activity             Side stepping             Step ups             Suitcase carry     30"x2 B 10# 3x30" b/l 10#       Gait Training                                       Modalities

## 2022-06-03 ENCOUNTER — OFFICE VISIT (OUTPATIENT)
Dept: PHYSICAL THERAPY | Facility: REHABILITATION | Age: 82
End: 2022-06-03
Payer: MEDICARE

## 2022-06-03 DIAGNOSIS — M54.50 ACUTE BILATERAL LOW BACK PAIN, UNSPECIFIED WHETHER SCIATICA PRESENT: Primary | ICD-10-CM

## 2022-06-03 PROCEDURE — 97110 THERAPEUTIC EXERCISES: CPT

## 2022-06-03 PROCEDURE — 97112 NEUROMUSCULAR REEDUCATION: CPT

## 2022-06-03 NOTE — PROGRESS NOTES
Daily Note     Today's date: 6/3/2022  Patient name: Edu Waters  : 1940  MRN: [id number]  Referring provider: Miladis Corona  Dx:   Encounter Diagnosis     ICD-10-CM    1  Acute bilateral low back pain, unspecified whether sciatica present  M54 51        Start Time: 945  Stop Time: 1030  Total time in clinic (min): 45 minutes    Subjective: Pt reports that her L LBP is "on its way out" but that she has some R low back stiffness entering today's treatment  Objective: See treatment diary below      Assessment: Tolerated treatment well  Patient would benefit from continued PT  Pt reported improvement of symptoms as well as demonstrated improved exercise tolerance  Progressed functional strengthening exercises and pt demonstrated appropriate response   1:1 with Archana Tijerina under the direct supervision Farrukh Reilly DPT for entirety of tx  Plan: Continue per plan of care        Precautions: Hard of hearing, HTN      Manuals 5/11 5/17 5/19 5/23 5/25 6/1 6/3      Hamstring/ quad stretch  B hamstring 30"x2 B hamstring 30"x2 B hamstring 30"x2 CM 30"x2  Ea B KP 3x30" ea b/l CM 30"x2  Ea                                              Neuro Re-Ed             TrA seated 5"x5 hep 5"x10 5"x5 5"x10 10"x5 10x5" dc      TrA+ bridge  x10 x10 5# x15 5# 2x10 5# 2x10 5# 2x10 8#      Mini squats  2x10 x10 2x10 x10 10x x10      DL  2x10 5# rack pull 2x10 5# rack pull 2x10 5#  Rack pull 3x6 15# KB rack pull 2x10 15# KB rack pull 2x10 20# KB rack pull      Paloff press             Resisted rotation  x10 B 5# x10 B 5# x10 B x10 B 5"# 10x ea b/l 5#       Seated resist L/s ext   2x10 8# 2x10 8#          Reverse hypers             Ther Ex             NS  10' L5 10' L5 10' L5 10' L5 10' L5 10' L5      L/s flexion 5"x5 seated hep             L/s rot 5"x5 B seated hep            STS  2x10 3# 2x10 5# 2x10 5# 2x10 5# 2x10 5# 2x10 5# OTB      Seated hamstring stretch  30" B 30" B 30" B 30" B 10x5" b/l 30" B Stand hip flexor stretch  30" B 30" B Manual  EOT c SKTC 30" B 3x20" b/l 3x20" b/l      Pball roll outs  5"x10 5"x10 5"x10 5"x10 10x5" 10x5"                   Ther Activity             Side stepping             Step ups             Suitcase carry     30"x2 B 10# 3x30" b/l 10# 3x30" L 15#      Gait Training                                       Modalities

## 2022-06-06 ENCOUNTER — OFFICE VISIT (OUTPATIENT)
Dept: PHYSICAL THERAPY | Facility: REHABILITATION | Age: 82
End: 2022-06-06
Payer: MEDICARE

## 2022-06-06 DIAGNOSIS — M54.50 ACUTE BILATERAL LOW BACK PAIN, UNSPECIFIED WHETHER SCIATICA PRESENT: Primary | ICD-10-CM

## 2022-06-06 PROCEDURE — 97112 NEUROMUSCULAR REEDUCATION: CPT

## 2022-06-06 PROCEDURE — 97110 THERAPEUTIC EXERCISES: CPT

## 2022-06-06 NOTE — PROGRESS NOTES
Daily Note     Today's date: 2022  Patient name: Evelio Alaniz  : 1940  MRN: [id number]  Referring provider: Papito Tello*  Dx:   Encounter Diagnosis     ICD-10-CM    1  Acute bilateral low back pain, unspecified whether sciatica present  M54 51        Start Time: 1043  Stop Time: 1138  Total time in clinic (min): 55 minutes    Subjective: Pt reports continued improvement  She notes only having pain when first waking up, but subsided with HEP  Objective: See treatment diary below      Assessment: Tolerated treatment well  Pt able to tolerate all exercises without pain  Provided pt with comprehensive HEP, able to demonstrate all exercises with good technique and no pain  Plan to d/c at f/u due to independent management with HEP  Monitor response to updated HEP at f/u to determine d/c  Pt scheduled to f/u with ortho next week  Patient would benefit from continued PT  1:1 with Emory Peacock DPT for entirety of tx  Plan: Continue per plan of care        Precautions: Hard of hearing, HTN      Manuals 5/11 5/17 5/19 5/23 5/25 6/1 6/3 6     Hamstring/ quad stretch  B hamstring 30"x2 B hamstring 30"x2 B hamstring 30"x2 CM 30"x2  Ea B KP 3x30" ea b/l CM 30"x2  Ea  CM 30"x2  Ea                                             Neuro Re-Ed             TrA seated 5"x5 hep 5"x10 5"x5 5"x10 10"x5 10x5" dc hep     TrA+ bridge  x10 x10 5# x15 5# 2x10 5# 2x10 5# 2x10 8# 2x15 8# hep dc    Mini squats  2x10 x10 2x10 x10 10x x10 x10 hep     DL  2x10 5# rack pull 2x10 5# rack pull 2x10 5#  Rack pull 3x6 15# KB rack pull 2x10 15# KB rack pull 2x10 20# KB rack pull 2x10 20# KB rack pull     Paloff press        x10 B btb     Resisted rotation  x10 B 5# x10 B 5# x10 B x10 B 5"# 10x ea b/l 5#       Seated resist L/s ext   2x10 8# 2x10 8#          Reverse hypers             Ther Ex             NS  10' L5 10' L5 10' L5 10' L5 10' L5 10' L5 10' L5     L/s flexion 5"x5 seated hep        hep     L/s rot 5"x5 B seated hep       hep     STS  2x10 3# 2x10 5# 2x10 5# 2x10 5# 2x10 5# 2x10 5# OTB 2x10 5# hep     Seated hamstring stretch  30" B 30" B 30" B 30" B 10x5" b/l 30" B 30" B hep     Stand hip flexor stretch  30" B 30" B Manual  EOT c SKTC 30" B 3x20" b/l 3x20" b/l 30" x2 B hep     Pball roll outs  5"x10 5"x10 5"x10 5"x10 10x5" 10x5"      LTR        5"x10 B hep     Ther Activity             Side stepping             Step ups             Suitcase carry     30"x2 B 10# 3x30" b/l 10# 3x30" L 15# 3x30" L 15#     Gait Training                                       Modalities

## 2022-06-08 ENCOUNTER — EVALUATION (OUTPATIENT)
Dept: PHYSICAL THERAPY | Facility: REHABILITATION | Age: 82
End: 2022-06-08
Payer: MEDICARE

## 2022-06-08 DIAGNOSIS — M54.50 ACUTE BILATERAL LOW BACK PAIN, UNSPECIFIED WHETHER SCIATICA PRESENT: Primary | ICD-10-CM

## 2022-06-08 PROCEDURE — 97110 THERAPEUTIC EXERCISES: CPT

## 2022-06-08 PROCEDURE — 97112 NEUROMUSCULAR REEDUCATION: CPT

## 2022-06-08 PROCEDURE — 97164 PT RE-EVAL EST PLAN CARE: CPT

## 2022-06-08 NOTE — PROGRESS NOTES
PT Re-Evaluation / Discharge     Today's date: 2022  Patient name: Maykel Haywood  : 1940  MRN: [id number]  Referring provider: Meir Ball*  Dx:   Encounter Diagnosis     ICD-10-CM    1  Acute bilateral low back pain, unspecified whether sciatica present  M54 51        Start Time: 1046  Stop Time: 1126  Total time in clinic (min): 40 minutes    Subjective: Pt reports making improvement since beginning PT  "When I used to get up from the couch and get a spasm, I don't anymore " Pt reports less pain with prolonged sitting, such as over 1 hour the other day while on the phone  Pt reports she's 90% back to functional baseline  Pt reports she performed comprehensive HEP yesterday, as taught in last session, and feels confident with continue with HEP for self-management  Pain  Current pain ratin  At best pain ratin  At worst pain rating: 3      Objective: See treatment diary below    Strength and ROM evaluated B from a regional biomechanical perspective and values relevant to this episode recorded in tables below  ROM:   Joint / Motion  Right  Left    Lumbar Flexion  25% limited    Lumbar Extension  25% limited     Lumbar Sidebending  25% limited 25% limited    Lumbar Rotation 25% limited 25% limited   Hip ER  [facility]    Hip IR  [facility] WFL   No pain with any ROM  Repeated Movements: NT      LE MMT Strength:  Test Action RIGHT  LEFT   Hip Flexion 4+/5 4+/5   Hip Abduction 4+/5 4+/5   Hip Extension 4/5 4/5   Knee Extension 5/5 5/5   Knee Flexion 5/5 5/5   Ankle DF 5/5 5/5   Ankle PF 5/5 5/5           Assessment: Maykel Haywood is a [de-identified]y o  year old female with a referred dx of low back pain  Pt has demonstrating improved ROM, functional strength, and reports less pain since beginning PT  Pt able to demonstrate independence with comprehensive HEP at previous session, and requests d/c from formal PT at this time for self-management of sx until f/u with ortho next week   Pt has met all PT goals  D/c skilled PT, transition to comprehensive HEP  Goals    Short Term Goals: In 4 weeks, the patient will:  1  Decrease worst pain by 2 points for improved QOL  - MET  2  Improve hip strength by 1/2 grade for improved LE fx  - MET  3  Supervision with HEP for self care  - MET    Long Term Goals: In 8 weeks, the patient will:  1  Report less pain with sitting for improved tolerance to fx positioning  - MET  2  FOTO to greater than predicted value  - MET  3  Independent with comprehensive HEP upon discharge  - MET  4   Report less pain upon standing following prolonged sitting to meet pt's goal  - MET          Plan: D/c skilled PT, transition to comprehensive HEP     Precautions: Hard of hearing, HTN      Manuals 5/11 5/17 5/19 5/23 5/25 6/1 6/3 6/6 6/8    Hamstring/ quad stretch  B hamstring 30"x2 B hamstring 30"x2 B hamstring 30"x2 CM 30"x2  Ea B KP 3x30" ea b/l CM 30"x2  Ea  CM 30"x2  Ea  CM 30"x2  Ea                                            Neuro Re-Ed             TrA seated 5"x5 hep 5"x10 5"x5 5"x10 10"x5 10x5" dc hep     TrA+ bridge  x10 x10 5# x15 5# 2x10 5# 2x10 5# 2x10 8# 2x15 8# hep dc    Mini squats  2x10 x10 2x10 x10 10x x10 x10 hep x10    DL  2x10 5# rack pull 2x10 5# rack pull 2x10 5#  Rack pull 3x6 15# KB rack pull 2x10 15# KB rack pull 2x10 20# KB rack pull 2x10 20# KB rack pull 2x10 20# KB rack pull    Paloff press        x10 B btb x10 B 6#    Resisted rotation  x10 B 5# x10 B 5# x10 B x10 B 5"# 10x ea b/l 5#   x10 B 5"#    Seated resist L/s ext   2x10 8# 2x10 8#          Reverse hypers             Ther Ex             NS  10' L5 10' L5 10' L5 10' L5 10' L5 10' L5 10' L5 10' L5    L/s flexion 5"x5 seated hep        hep     L/s rot 5"x5 B seated hep       hep     STS  2x10 3# 2x10 5# 2x10 5# 2x10 5# 2x10 5# 2x10 5# OTB 2x10 5# hep 2x10 5#    Seated hamstring stretch  30" B 30" B 30" B 30" B 10x5" b/l 30" B 30" B hep 30" B    Stand hip flexor stretch  30" B 30" B Manual  EOT c SKTC 30" B 3x20" b/l 3x20" b/l 30" x2 B hep 30" B    Pball roll outs  5"x10 5"x10 5"x10 5"x10 10x5" 10x5"      LTR        5"x10 B hep     Ther Activity             Side stepping             Step ups             Suitcase carry     30"x2 B 10# 3x30" b/l 10# 3x30" L 15# 3x30" L 15#     Gait Training                                       Modalities

## 2022-06-14 ENCOUNTER — OFFICE VISIT (OUTPATIENT)
Dept: OBGYN CLINIC | Facility: CLINIC | Age: 82
End: 2022-06-14
Payer: MEDICARE

## 2022-06-14 VITALS
WEIGHT: 170 LBS | SYSTOLIC BLOOD PRESSURE: 140 MMHG | DIASTOLIC BLOOD PRESSURE: 62 MMHG | HEART RATE: 65 BPM | BODY MASS INDEX: 27.32 KG/M2 | HEIGHT: 66 IN

## 2022-06-14 DIAGNOSIS — S39.92XD INJURY OF LOW BACK, SUBSEQUENT ENCOUNTER: Primary | ICD-10-CM

## 2022-06-14 PROCEDURE — 99213 OFFICE O/P EST LOW 20 MIN: CPT | Performed by: FAMILY MEDICINE

## 2022-06-14 NOTE — PROGRESS NOTES
[facility] [place] [id number]  [place] [id number] [phone]      Chief Complaint:  Chief Complaint   Patient presents with    Spine - Follow-up       Vitals:  /62   Pulse 65   Ht 5' 6" (1 676 m)   Wt 77 1 kg (170 lb)   BMI 27 44 kg/m²     The following portions of the patient's history were reviewed and updated as appropriate: allergies, current medications, past family history, past medical history, past social history, past surgical history, and problem list       Subjective:   Patient ID: Dennis Tucker is a [de-identified] y o  female  Here for f/u lower back pain  Doing a lot better  Intermittent pain  Going to PT- discharged  Doing home exercises  No radiation of pain  Denies n/t  Tylenol PRN         Review of Systems   Constitutional: Negative for fatigue and fever  Respiratory: Negative for shortness of breath  Cardiovascular: Negative for chest pain  Gastrointestinal: Negative for abdominal pain and nausea  Genitourinary: Negative for dysuria  Musculoskeletal: Positive for back pain  Skin: Negative for rash and wound  Neurological: Negative for weakness and headaches  Objective:  Back Exam     Tenderness   The patient is experiencing no tenderness  Range of Motion   The patient has normal back ROM  Muscle Strength   The patient has normal back strength  Tests   Straight leg raise right: negative  Straight leg raise left: negative    Reflexes   Patellar: normal            Physical Exam  Constitutional:       Appearance: Normal appearance  She is normal weight  HENT:      Head: Normocephalic  Eyes:      Extraocular Movements: Extraocular movements intact  Pulmonary:      Effort: Pulmonary effort is normal    Musculoskeletal:      Cervical back: Normal range of motion        Lumbar back: Negative right straight leg raise test and negative left straight leg raise test    Skin:     General: Skin is warm and dry  Neurological:      General: No focal deficit present  Mental Status: She is alert and oriented to person, place, and time  Mental status is at baseline  Psychiatric:         Mood and Affect: Mood normal          Behavior: Behavior normal          Thought Content: Thought content normal          Judgment: Judgment normal                Assessment/Plan:  Assessment/Plan   Diagnoses and all orders for this visit:    Injury of low back, subsequent encounter        Return if symptoms worsen or fail to improve       Yumi Martin MD

## 2022-07-28 ENCOUNTER — OFFICE VISIT (OUTPATIENT)
Dept: FAMILY MEDICINE CLINIC | Facility: CLINIC | Age: 82
End: 2022-07-28
Payer: MEDICARE

## 2022-07-28 VITALS
DIASTOLIC BLOOD PRESSURE: 70 MMHG | TEMPERATURE: 96.8 F | WEIGHT: 166 LBS | HEIGHT: 66 IN | BODY MASS INDEX: 26.68 KG/M2 | SYSTOLIC BLOOD PRESSURE: 138 MMHG | HEART RATE: 92 BPM | OXYGEN SATURATION: 98 %

## 2022-07-28 DIAGNOSIS — E78.00 PURE HYPERCHOLESTEROLEMIA: ICD-10-CM

## 2022-07-28 DIAGNOSIS — R31.21 ASYMPTOMATIC MICROSCOPIC HEMATURIA: ICD-10-CM

## 2022-07-28 DIAGNOSIS — I73.9 PERIPHERAL VASCULAR DISEASE (HCC): ICD-10-CM

## 2022-07-28 DIAGNOSIS — E11.69 TYPE 2 DIABETES MELLITUS WITH OTHER SPECIFIED COMPLICATION, WITHOUT LONG-TERM CURRENT USE OF INSULIN (HCC): Primary | ICD-10-CM

## 2022-07-28 DIAGNOSIS — Z23 ENCOUNTER FOR IMMUNIZATION: ICD-10-CM

## 2022-07-28 DIAGNOSIS — N18.31 CHRONIC RENAL IMPAIRMENT, STAGE 3A (HCC): ICD-10-CM

## 2022-07-28 DIAGNOSIS — I10 ESSENTIAL HYPERTENSION: ICD-10-CM

## 2022-07-28 PROCEDURE — 90677 PCV20 VACCINE IM: CPT

## 2022-07-28 PROCEDURE — G0009 ADMIN PNEUMOCOCCAL VACCINE: HCPCS

## 2022-07-28 PROCEDURE — 99214 OFFICE O/P EST MOD 30 MIN: CPT | Performed by: FAMILY MEDICINE

## 2022-07-28 NOTE — PROGRESS NOTES
Assessment/Plan:       No problem-specific Assessment & Plan notes found for this encounter  Diagnoses and all orders for this visit:    Type 2 diabetes mellitus with other specified complication, without long-term current use of insulin ([facility] )  Comments:  well controlled , continue diet  Chronic renal impairment, stage 3a (HCC)  Comments:  little worse  , increase fluid intake  avoid NSAID  Orders:  -     Basic metabolic panel; Future  -     Phosphorus; Future  -     Magnesium; Future  -     PTH, intact; Future  -     Vitamin D 25 hydroxy; Future    Essential hypertension  Comments:  controlled, to follow with dash diet  Pure hypercholesterolemia  Comments:  good control   to continue  with  low fat diet    Peripheral vascular disease ([facility] )  Comments:  Asymptomatic   pt to call if develop claudication  Asymptomatic microscopic hematuria  Comments:  following with  urology    Encounter for immunization  Comments:  side affect discusse   Orders:  -     Pneumococcal Conjugate Vaccine 20-valent (Pcv20)        Patient Instructions   To follow with test results      Orders Placed This Encounter   Procedures    Pneumococcal Conjugate Vaccine 20-valent (Pcv20)    Basic metabolic panel     This is a patient instruction: Patient fasting for 8 hours or longer recommended  Standing Status:   Future     Standing Expiration Date:   7/28/2023    Phosphorus     Standing Status:   Future     Standing Expiration Date:   7/28/2023    Magnesium     Standing Status:   Future     Standing Expiration Date:   7/28/2023    PTH, intact     Standing Status:   Future     Standing Expiration Date:   7/28/2023    Vitamin D 25 hydroxy     Standing Status:   Future     Standing Expiration Date:   7/28/2023         Subjective:     Patient ID: Joelle Jeff is a [de-identified] y o  female      HPI   Patient is here for follow-up on her chronic medical problems  HTN  Admit to regular salt diet    Denied headache or dizziness  CRI, denied edema admit not drinking enough fluid  DM  Denied polyuria or polydipsia  Admit to regular sweet and carbohydrate intake  Does not check blood sugar at home  Hyperlipidemia   Admit to regular fat intake  Denied chest pain  PVD, denied claudication or change in the color of her feet    Test results  Labs done 7-20-22   discussed results with  Pt   Review of Systems   Constitutional: Negative for activity change, appetite change, chills, fatigue, fever and unexpected weight change  HENT: Negative for congestion, ear discharge, ear pain, hearing loss, nosebleeds, rhinorrhea, sinus pressure, sore throat, tinnitus, trouble swallowing and voice change  Eyes: Negative for photophobia, pain and visual disturbance  Respiratory: Negative for cough, chest tightness, shortness of breath and wheezing  Cardiovascular: Negative for chest pain, palpitations and leg swelling  Gastrointestinal: Negative for abdominal pain, anal bleeding, blood in stool, constipation, diarrhea, nausea and vomiting  Endocrine: Negative for cold intolerance, heat intolerance, polydipsia and polyuria  Genitourinary: Negative for dysuria, frequency, hematuria, urgency and vaginal bleeding  Musculoskeletal: Negative for arthralgias, back pain, gait problem, joint swelling, myalgias and neck pain  Skin: Negative for rash  Neurological: Negative for dizziness, tremors, seizures, syncope, weakness, light-headedness and headaches  Hematological: Negative for adenopathy  Does not bruise/bleed easily  Psychiatric/Behavioral: Negative for agitation, behavioral problems, confusion, dysphoric mood, hallucinations and sleep disturbance  The patient is not nervous/anxious  Objective:     Physical Exam  Constitutional:       General: She is not in acute distress  Appearance: Normal appearance  She is well-developed  She is not ill-appearing or diaphoretic  HENT:      Head: Normocephalic and atraumatic     Eyes: General: No scleral icterus  Conjunctiva/sclera: Conjunctivae normal       Pupils: Pupils are equal, round, and reactive to light  Neck:      Thyroid: No thyromegaly  Vascular: No carotid bruit or JVD  Cardiovascular:      Rate and Rhythm: Normal rate and regular rhythm  Heart sounds: Normal heart sounds  No murmur heard  Comments: Extremities  No edema  Pulmonary:      Effort: Pulmonary effort is normal       Breath sounds: Normal breath sounds  Abdominal:      General: There is no distension  Palpations: Abdomen is soft  There is no mass  Tenderness: There is no abdominal tenderness  There is no guarding or rebound  Hernia: No hernia is present  Musculoskeletal:      Right lower leg: No edema  Left lower leg: No edema  Lymphadenopathy:      Cervical: No cervical adenopathy  Skin:     Findings: No bruising or rash  Neurological:      General: No focal deficit present  Mental Status: She is alert and oriented to person, place, and time  Cranial Nerves: No cranial nerve deficit  Motor: No abnormal muscle tone  Coordination: Coordination normal       Gait: Gait normal    Psychiatric:         Mood and Affect: Mood normal          Behavior: Behavior normal          Thought Content:  Thought content normal          Judgment: Judgment normal

## 2022-08-28 DIAGNOSIS — I10 ESSENTIAL HYPERTENSION: ICD-10-CM

## 2022-08-28 DIAGNOSIS — E78.00 PURE HYPERCHOLESTEROLEMIA: ICD-10-CM

## 2022-08-29 RX ORDER — DILTIAZEM HYDROCHLORIDE EXTENDED-RELEASE TABLETS 240 MG/1
240 TABLET, EXTENDED RELEASE ORAL DAILY
Qty: 90 TABLET | Refills: 0 | Status: SHIPPED | OUTPATIENT
Start: 2022-08-29

## 2022-08-29 RX ORDER — OLMESARTAN MEDOXOMIL 20 MG/1
20 TABLET ORAL DAILY
Qty: 90 TABLET | Refills: 0 | Status: SHIPPED | OUTPATIENT
Start: 2022-08-29

## 2022-08-29 RX ORDER — ROSUVASTATIN CALCIUM 10 MG/1
10 TABLET, COATED ORAL DAILY
Qty: 90 TABLET | Refills: 0 | Status: SHIPPED | OUTPATIENT
Start: 2022-08-29

## 2022-08-29 RX ORDER — EZETIMIBE 10 MG/1
10 TABLET ORAL DAILY
Qty: 90 TABLET | Refills: 0 | Status: SHIPPED | OUTPATIENT
Start: 2022-08-29

## 2022-09-07 ENCOUNTER — HOSPITAL ENCOUNTER (OUTPATIENT)
Dept: ULTRASOUND IMAGING | Facility: HOSPITAL | Age: 82
Discharge: HOME | End: 2022-09-07
Payer: MEDICARE

## 2022-09-07 DIAGNOSIS — N28.1 COMPLEX RENAL CYST: ICD-10-CM

## 2022-09-07 PROCEDURE — 76770 US EXAM ABDO BACK WALL COMP: CPT

## 2022-09-13 ENCOUNTER — TELEPHONE (OUTPATIENT)
Dept: UROLOGY | Facility: MEDICAL CENTER | Age: 82
End: 2022-09-13

## 2022-09-13 NOTE — TELEPHONE ENCOUNTER
[address] 89 with stable stable cysts  Should have annual in October 2022, don't see anything scheduled  Could we make sure pt has f/u?      Thanks    Contacted patient appt given for 11/2/22 in our [place] location

## 2022-11-02 ENCOUNTER — OFFICE VISIT (OUTPATIENT)
Dept: UROLOGY | Facility: MEDICAL CENTER | Age: 82
End: 2022-11-02

## 2022-11-02 VITALS
BODY MASS INDEX: 27.32 KG/M2 | SYSTOLIC BLOOD PRESSURE: 130 MMHG | DIASTOLIC BLOOD PRESSURE: 66 MMHG | HEIGHT: 66 IN | HEART RATE: 73 BPM | WEIGHT: 170 LBS

## 2022-11-02 DIAGNOSIS — N28.1 COMPLEX RENAL CYST: Primary | ICD-10-CM

## 2022-11-02 LAB
SL AMB  POCT GLUCOSE, UA: ABNORMAL
SL AMB LEUKOCYTE ESTERASE,UA: ABNORMAL
SL AMB POCT BILIRUBIN,UA: ABNORMAL
SL AMB POCT BLOOD,UA: ABNORMAL
SL AMB POCT CLARITY,UA: CLEAR
SL AMB POCT COLOR,UA: YELLOW
SL AMB POCT KETONES,UA: ABNORMAL
SL AMB POCT NITRITE,UA: ABNORMAL
SL AMB POCT PH,UA: 7
SL AMB POCT SPECIFIC GRAVITY,UA: 1.02
SL AMB POCT URINE PROTEIN: ABNORMAL
SL AMB POCT UROBILINOGEN: 0.2

## 2022-11-02 NOTE — PROGRESS NOTES
11/2/2022      Chief Complaint   Patient presents with   • Kidney Cyst         Assessment and Plan    81 y o  female managed by Dr Sneha Mkceon     1  Bilateral complex renal cyst  · Renal US 9/7/22 with stable bilateral complex renal cysts  · Remains asymptomatic  · Follow up in 1 year with US       2  Asymptomatic benign essential microscopic hematuria  · Urine today: negative for blood or infection    · S/p negative cystoscopy on 3/13/2020  · Renal US as above  · No need for repeat work up at this time      3  Nocturia  · Unchanged  Patient states she is not bothered by these symptoms and would rather continue to drink bladder irritants before bed  History of Present Illness  Maryanne El is a 81 y o  female here for evaluation of complex renal cysts, microscopic hematuria, and nocturia  Patient's renal cyst remains stable on ultrasound from 09/07/2022  She denies any flank or abdominal pain  She denies any episodes of gross hematuria  She does have a history of microscopic hematuria and is s/p negative cystoscopy in March of 2020  Her urine today is actually negative for trace of blood  She continues to have nocturia 2-3 times per night  She enjoys drinking herbal tea before bed and states that the nocturia does not bother her  She otherwise denies any urinary symptoms  She denies any fevers or chills  She is agreeable to plan above  Review of Systems   Constitutional: Negative for chills and fever  HENT: Negative  Respiratory: Negative  Cardiovascular: Negative  Gastrointestinal: Negative for abdominal pain, nausea and vomiting  Genitourinary: Negative for difficulty urinating, dysuria, flank pain, frequency, hematuria and urgency  Nocturia 2-3x per night  Musculoskeletal: Positive for back pain  Skin: Negative  Neurological: Negative        Vitals  Vitals:    11/02/22 1301   BP: 130/66   Pulse: 73   Weight: 77 1 kg (170 lb)   Height: 5' 6" (1 676 m) Physical Exam  Vitals reviewed  Constitutional:       General: She is not in acute distress  Appearance: Normal appearance  She is not ill-appearing, toxic-appearing or diaphoretic  HENT:      Head: Normocephalic and atraumatic  Eyes:      Conjunctiva/sclera: Conjunctivae normal    Pulmonary:      Effort: Pulmonary effort is normal  No respiratory distress  Abdominal:      General: There is no distension  Palpations: Abdomen is soft  Tenderness: There is no abdominal tenderness  There is no right CVA tenderness, left CVA tenderness, guarding or rebound  Musculoskeletal:         General: Normal range of motion  Cervical back: Normal range of motion  Skin:     General: Skin is warm and dry  Neurological:      General: No focal deficit present  Mental Status: She is alert and oriented to person, place, and time  Psychiatric:         Mood and Affect: Mood normal          Behavior: Behavior normal          Thought Content: Thought content normal          Judgment: Judgment normal        Past History  Past Medical History:   Diagnosis Date   • Arthritis     Chronic problem   • Chronic kidney disease     GFR is low   • Difficulty walking 2021    heel pain in left foot   • Fallen arches 2014    Right foot   • Hypertension    • Liver disease     liver cysts   • Plantar fasciitis 11/3/20     Social History     Socioeconomic History   • Marital status:      Spouse name: None   • Number of children: None   • Years of education: None   • Highest education level: None   Occupational History   • None   Tobacco Use   • Smoking status: Former Smoker     Packs/day: 0 25     Years: 15 00     Pack years: 3 75     Types: Cigarettes     Quit date: 10/20/2003     Years since quittin 0   • Smokeless tobacco: Never Used   • Tobacco comment: I did quit   Vaping Use   • Vaping Use: Never used   Substance and Sexual Activity   • Alcohol use:  Yes     Alcohol/week: 0 0 standard drinks     Comment: I have a beer or mixed drink when in a restaurant   • Drug use: No   • Sexual activity: Not Currently   Other Topics Concern   • None   Social History Narrative   • None     Social Determinants of Health     Financial Resource Strain: Not on file   Food Insecurity: Not on file   Transportation Needs: Not on file   Physical Activity: Not on file   Stress: Not on file   Social Connections: Not on file   Intimate Partner Violence: Not on file   Housing Stability: Not on file     Social History     Tobacco Use   Smoking Status Former Smoker   • Packs/day: 0 25   • Years: 15 00   • Pack years: 3 75   • Types: Cigarettes   • Quit date: 10/20/2003   • Years since quittin 0   Smokeless Tobacco Never Used   Tobacco Comment    I did quit     Family History   Problem Relation Age of Onset   • Breast cancer Mother 46   • Heart attack Father    • Coronary artery disease Father    • Arthritis Father            • Heart disease Father            • Colon polyps Brother    • No Known Problems Maternal Grandmother    • No Known Problems Maternal Grandfather    • No Known Problems Paternal Grandmother    • No Known Problems Paternal Grandfather    • No Known Problems Son    • No Known Problems Maternal Aunt    • No Known Problems Maternal Aunt    • No Known Problems Maternal Aunt    • No Known Problems Maternal Aunt    • No Known Problems Maternal Aunt        The following portions of the patient's history were reviewed and updated as appropriate: allergies, current medications, past medical history, past social history, past surgical history and problem list     Results  Recent Results (from the past 1 hour(s))   POCT urine dip auto non-scope    Collection Time: 22  1:07 PM   Result Value Ref Range     COLOR,UA yellow     CLARITY,UA clear     SPECIFIC GRAVITY,UA 1 020      PH,UA 7 0     LEUKOCYTE ESTERASE,UA n     NITRITE,UA n     GLUCOSE, UA n     Ledy Griffith n     BLOOD,UA n     POCT URINE PROTEIN n     SL AMB POCT UROBILINOGEN 0 2    ]  No results found for: PSA  Lab Results   Component Value Date    CALCIUM 9 2 10/11/2019    K 4 0 10/11/2019    CO2 24 10/11/2019     10/11/2019    BUN 28 (H) 10/11/2019    CREATININE 0 81 10/11/2019     Lab Results   Component Value Date    WBC 8 56 10/11/2019    HGB 12 5 10/11/2019    HCT 38 1 10/11/2019    MCV 95 10/11/2019     10/11/2019     Elisa Beltran PA-C

## 2022-11-10 ENCOUNTER — RA CDI HCC (OUTPATIENT)
Dept: OTHER | Facility: HOSPITAL | Age: 82
End: 2022-11-10

## 2022-11-10 NOTE — PROGRESS NOTES
[address]  coding opportunities       Chart reviewed, no opportunity found: CHART REVIEWED, NO OPPORTUNITY FOUND        Patients Insurance     Medicare Insurance: Medicare

## 2022-11-23 ENCOUNTER — OFFICE VISIT (OUTPATIENT)
Dept: FAMILY MEDICINE CLINIC | Facility: CLINIC | Age: 82
End: 2022-11-23

## 2022-11-23 VITALS
HEART RATE: 86 BPM | BODY MASS INDEX: 26.9 KG/M2 | DIASTOLIC BLOOD PRESSURE: 73 MMHG | SYSTOLIC BLOOD PRESSURE: 131 MMHG | HEIGHT: 66 IN | TEMPERATURE: 97.8 F | OXYGEN SATURATION: 98 % | WEIGHT: 167.4 LBS

## 2022-11-23 DIAGNOSIS — M65.311 TRIGGER THUMB OF RIGHT HAND: ICD-10-CM

## 2022-11-23 DIAGNOSIS — R31.21 ASYMPTOMATIC MICROSCOPIC HEMATURIA: ICD-10-CM

## 2022-11-23 DIAGNOSIS — N28.1 CYST OF RIGHT KIDNEY: ICD-10-CM

## 2022-11-23 DIAGNOSIS — N18.31 CHRONIC RENAL IMPAIRMENT, STAGE 3A (HCC): ICD-10-CM

## 2022-11-23 DIAGNOSIS — E78.00 PURE HYPERCHOLESTEROLEMIA: ICD-10-CM

## 2022-11-23 DIAGNOSIS — I73.9 PERIPHERAL VASCULAR DISEASE (HCC): ICD-10-CM

## 2022-11-23 DIAGNOSIS — E83.41 HIGH MAGNESIUM LEVELS: ICD-10-CM

## 2022-11-23 DIAGNOSIS — E11.69 TYPE 2 DIABETES MELLITUS WITH OTHER SPECIFIED COMPLICATION, WITHOUT LONG-TERM CURRENT USE OF INSULIN (HCC): ICD-10-CM

## 2022-11-23 DIAGNOSIS — I10 ESSENTIAL HYPERTENSION: ICD-10-CM

## 2022-11-23 DIAGNOSIS — Z12.31 ENCOUNTER FOR SCREENING MAMMOGRAM FOR MALIGNANT NEOPLASM OF BREAST: Primary | ICD-10-CM

## 2022-11-23 RX ORDER — EZETIMIBE 10 MG/1
10 TABLET ORAL DAILY
Qty: 90 TABLET | Refills: 0 | Status: SHIPPED | OUTPATIENT
Start: 2022-11-23

## 2022-11-23 RX ORDER — DILTIAZEM HYDROCHLORIDE EXTENDED-RELEASE TABLETS 240 MG/1
240 TABLET, EXTENDED RELEASE ORAL DAILY
Qty: 90 TABLET | Refills: 0 | Status: SHIPPED | OUTPATIENT
Start: 2022-11-23

## 2022-11-23 RX ORDER — ROSUVASTATIN CALCIUM 10 MG/1
10 TABLET, COATED ORAL DAILY
Qty: 90 TABLET | Refills: 0 | Status: SHIPPED | OUTPATIENT
Start: 2022-11-23

## 2022-11-23 RX ORDER — OLMESARTAN MEDOXOMIL 20 MG/1
20 TABLET ORAL DAILY
Qty: 90 TABLET | Refills: 0 | Status: SHIPPED | OUTPATIENT
Start: 2022-11-23

## 2022-11-23 NOTE — PROGRESS NOTES
Assessment/Plan:       No problem-specific Assessment & Plan notes found for this encounter  Diagnoses and all orders for this visit:    Encounter for screening mammogram for malignant neoplasm of breast  -     Mammo screening bilateral w 3d & cad; Future    Essential hypertension  Comments:  Controlled   Continue medication  To follow with the dash diet  Orders:  -     diltiazem (CARDIZEM LA) 240 MG 24 hr tablet; Take 1 tablet (240 mg total) by mouth daily  -     olmesartan (Benicar) 20 mg tablet; Take 1 tablet (20 mg total) by mouth daily    Pure hypercholesterolemia  Comments: To follow with low-fat diet  Orders:  -     ezetimibe (ZETIA) 10 mg tablet; Take 1 tablet (10 mg total) by mouth daily  -     rosuvastatin (CRESTOR) 10 MG tablet; Take 1 tablet (10 mg total) by mouth daily  -     Lipid Panel with Direct LDL reflex; Future  -     Hepatic function panel; Future    Type 2 diabetes mellitus with other specified complication, without long-term current use of insulin (HCC)  Comments: To follow with 1800 calorie diet  Orders:  -     Hemoglobin A1C; Future    Chronic renal impairment, stage 3a (HCC)  Comments:  over all stable, reminded patient to avoid NSAID and hydrate self well    Asymptomatic microscopic hematuria  Comments:  following with  urology    Peripheral vascular disease [facility])  Comments:  Patient is asymptomatic  Advised to call if she developed claudication    Repeat arterial Doppler of the lower extremities March/2023    Cyst of right kidney  Comments:  following with  urology    High magnesium levels  Comments:  to follow with low mag diet    Trigger thumb of right hand  Comments:  pt will follow with her ortho        Patient Instructions   To follow up with test results      Orders Placed This Encounter   Procedures   • Mammo screening bilateral w 3d & cad     Standing Status:   Future     Standing Expiration Date:   11/23/2026     Scheduling Instructions:      Do not wear any perfume, powder, lotion or deodorant on the breast or underarm area  If you have had any prior breast studies done at a different facility than [facility], please bring a copy of the study with you on the day of your test  Please allow at least 30 minutes for this appointment  Please bring your insurance cards, a form of photo ID and a list of your medications with you  To schedule this appointment, please contact Central Scheduling at [phone]  Order Specific Question:   Approval for Diagnostic Call Back/Ultrasound If Necessary     Answer:   Yes     Order Specific Question:   Approval for Coordination for Additional Testing     Answer: Yes   • Hemoglobin A1C     Standing Status:   Future     Standing Expiration Date:   11/23/2023   • Lipid Panel with Direct LDL reflex     This is a patient instruction: This test requires patient fasting for 10-12 hours or longer  Drinking of black coffee or black tea is acceptable  Standing Status:   Future     Standing Expiration Date:   11/23/2023   • Hepatic function panel     This is a patient instruction: This test is non-fasting  Please drink two glasses of water morning of bloodwork  Standing Status:   Future     Standing Expiration Date:   11/23/2023         Subjective:     Patient ID: Bolivar Lozano is a 81 y o  female      HPI  Follow up chronic medical problem  CRI , denied edema or fatigue  DM, denied polyuria or polydipsia  Admit to low/regular sweet and carbohydrate intake   hyperlipidemia, admit to low/regular fat intake  PVD  Denied claudication  Microscopic hematuria  Denied hematuria , following with  Urology  New complaint  Patient stated she notice mild clicking of the left thumb at the proximal joint for the last 1-2 months  There is no swelling, or redness  There is minor pain with flexion of the thumb    Denied injury denied weakness or numbness    Had flu shot 2 weeks ago at the pharmacy    Test results  renal and bladder us  Labs 10-2-22   discussed results with  Pt  Review of Systems   Constitutional: Negative for activity change, appetite change, chills, fatigue, fever and unexpected weight change  HENT: Negative for congestion, ear discharge, ear pain, hearing loss, nosebleeds, rhinorrhea, sinus pressure, sore throat, tinnitus, trouble swallowing and voice change  Eyes: Negative for photophobia, pain and visual disturbance  Respiratory: Negative for cough, chest tightness, shortness of breath and wheezing  Cardiovascular: Negative for chest pain, palpitations and leg swelling  Gastrointestinal: Negative for abdominal pain, anal bleeding, blood in stool, constipation, diarrhea, nausea and vomiting  Endocrine: Negative for cold intolerance, heat intolerance, polydipsia and polyuria  Genitourinary: Negative for dysuria, frequency, hematuria and urgency  Musculoskeletal: Negative for back pain, gait problem, joint swelling, myalgias and neck pain  Skin: Negative for rash  Neurological: Negative for dizziness, tremors, seizures, syncope, weakness, light-headedness and headaches  Hematological: Negative for adenopathy  Does not bruise/bleed easily  Psychiatric/Behavioral: Negative for agitation, behavioral problems, confusion, dysphoric mood, hallucinations and sleep disturbance  The patient is not nervous/anxious  Objective:     Physical Exam  Constitutional:       General: She is not in acute distress  Appearance: Normal appearance  She is well-developed and well-nourished  She is not diaphoretic  HENT:      Head: Normocephalic and atraumatic  Right Ear: External ear normal       Left Ear: External ear normal       Nose: Nose normal       Mouth/Throat:      Mouth: Oropharynx is clear and moist       Pharynx: No oropharyngeal exudate  Eyes:      General: No scleral icterus       Extraocular Movements: EOM normal       Conjunctiva/sclera: Conjunctivae normal       Pupils: Pupils are equal, round, and reactive to light  Neck:      Thyroid: No thyromegaly  Vascular: No JVD  Cardiovascular:      Rate and Rhythm: Normal rate and regular rhythm  Pulses:           Carotid pulses are 3+ on the right side and 3+ on the left side  Dorsalis pedis pulses are 3+ on the right side and 3+ on the left side  Posterior tibial pulses are 3+ on the right side and 3+ on the left side  Heart sounds: Normal heart sounds  No murmur heard  Pulmonary:      Effort: Pulmonary effort is normal  No respiratory distress  Breath sounds: Normal breath sounds  No stridor  No wheezing or rales  Abdominal:      General: Bowel sounds are normal  There is no distension  Palpations: Abdomen is soft  There is no mass  Tenderness: There is no abdominal tenderness  There is no guarding or rebound  Hernia: No hernia is present  Musculoskeletal:         General: No deformity or edema  Normal range of motion  Cervical back: Neck supple  Right lower leg: No edema  Left lower leg: No edema  Comments: Right thumb , _ click at distal joint   chronic arthritic changes  No acute changes  No tenderness   Lymphadenopathy:      Cervical: No cervical adenopathy  Skin:     Findings: No rash  Neurological:      General: No focal deficit present  Mental Status: She is alert and oriented to person, place, and time  Cranial Nerves: No cranial nerve deficit  Sensory: No sensory deficit  Motor: No weakness or abnormal muscle tone  Coordination: Coordination normal       Gait: Gait normal       Deep Tendon Reflexes: Reflexes normal    Psychiatric:         Mood and Affect: Mood and affect and mood normal          Behavior: Behavior normal          Thought Content:  Thought content normal          Judgment: Judgment normal

## 2022-11-25 PROBLEM — M65.311 TRIGGER THUMB OF RIGHT HAND: Status: ACTIVE | Noted: 2022-11-25

## 2022-12-06 ENCOUNTER — OFFICE VISIT (OUTPATIENT)
Dept: OBGYN CLINIC | Facility: CLINIC | Age: 82
End: 2022-12-06

## 2022-12-06 VITALS
WEIGHT: 167 LBS | HEIGHT: 66 IN | DIASTOLIC BLOOD PRESSURE: 70 MMHG | SYSTOLIC BLOOD PRESSURE: 130 MMHG | BODY MASS INDEX: 26.84 KG/M2

## 2022-12-06 DIAGNOSIS — M65.312 TRIGGER THUMB OF LEFT HAND: ICD-10-CM

## 2022-12-06 DIAGNOSIS — M25.561 ACUTE PAIN OF RIGHT KNEE: Primary | ICD-10-CM

## 2022-12-06 RX ORDER — BETAMETHASONE SODIUM PHOSPHATE AND BETAMETHASONE ACETATE 3; 3 MG/ML; MG/ML
6 INJECTION, SUSPENSION INTRA-ARTICULAR; INTRALESIONAL; INTRAMUSCULAR; SOFT TISSUE
Status: COMPLETED | OUTPATIENT
Start: 2022-12-06 | End: 2022-12-06

## 2022-12-06 RX ORDER — LIDOCAINE HYDROCHLORIDE 10 MG/ML
1 INJECTION, SOLUTION INFILTRATION; PERINEURAL
Status: COMPLETED | OUTPATIENT
Start: 2022-12-06 | End: 2022-12-06

## 2022-12-06 RX ORDER — LIDOCAINE HYDROCHLORIDE 10 MG/ML
2 INJECTION, SOLUTION INFILTRATION; PERINEURAL
Status: COMPLETED | OUTPATIENT
Start: 2022-12-06 | End: 2022-12-06

## 2022-12-06 RX ADMIN — LIDOCAINE HYDROCHLORIDE 1 ML: 10 INJECTION, SOLUTION INFILTRATION; PERINEURAL at 15:16

## 2022-12-06 RX ADMIN — BETAMETHASONE SODIUM PHOSPHATE AND BETAMETHASONE ACETATE 6 MG: 3; 3 INJECTION, SUSPENSION INTRA-ARTICULAR; INTRALESIONAL; INTRAMUSCULAR; SOFT TISSUE at 15:16

## 2022-12-06 RX ADMIN — LIDOCAINE HYDROCHLORIDE 2 ML: 10 INJECTION, SOLUTION INFILTRATION; PERINEURAL at 15:16

## 2022-12-06 NOTE — PROGRESS NOTES
Patient Name:  Ryan Kent  MRN:  [id number]    Assessment & Plan     Left trigger thumb  Right knee pain, mild DJD  1  Corticosteroid injection performed today into the left thumb A1 pulley  2  Activities as tolerated regards to the left hand  3  Corticosteroid injection performed today into the right knee as a diagnostic and therapeutic measure  4  Referral also placed for physical therapy for the right knee  5  Continue Tylenol as needed  6  Patient wishes to follow-up as needed  She will contact me if symptoms persist with regards to the knee and thumb  Chief Complaint     Right knee pain, left thumb pain    History of the Present Illness     Ryan Kent is a 81 y o  female who reports to the office today for evaluation of her right knee  She notes an onset of pain in her right knee approximately 2 weeks ago  She denies any injury or trauma  She notes pain localized primarily to the lateral and posterior aspect of the knee with radiation distally along the lower extremity along the lateral calf  Pain is worse first thing in the morning  She notes associated stiffness  Pain improves throughout the day  She takes Tylenol with improvement as well  She denies any significant stiffness  No weakness or instability  No numbness or tingling  No fevers or chills  She also notes pain in the left thumb  This has been ongoing for 2 months  She denies any injury or trauma  She notes pain at the base of the left thumb with associated clicking and popping  She states on occasion her thumb will be stuck in a flexed position  No numbness or tingling  No fevers or chills  Physical Exam     /70   Ht 5' 6" (1 676 m)   Wt 75 8 kg (167 lb)   BMI 26 95 kg/m²     Right knee: No gross deformity  Skin intact  No erythema ecchymosis or swelling  No knee effusion  Tenderness palpation lateral and posterior knee  No tenderness palpation medially    Range of motion 0-120 without significant pain   Stable to varus motor stress without pain  Stable Lachman's  Negative posterior drawer test   Negative Gore's test   Sensation intact distally  Left thumb: No gross deformity  Skin intact  No erythema ecchymosis or swelling  Palpable nodule about the A1 pulley of the left thumb which is tender to palpation  Thumb range of motion is intact with associated clicking, popping, locking and triggering evident  Sensation intact distally  Brisk capillary refill  No tenderness elevation of thumb CMC joint  Negative CMC grind test     Eyes: Anicteric sclerae  ENT: Trachea midline  Lungs: Normal respiratory effort  CV: Capillary refill is less than 2 seconds  Skin: Intact without erythema  Lymph: No palpable lymphadenopathy  Neuro: Sensation is grossly intact to light touch  Psych: Mood and affect are appropriate  Data Review     I have personally reviewed pertinent films in PACS, and my interpretation follows:    X-rays right knee 12/6/2022: No acute osseous abnormalities  No fracture or dislocation  Mild degenerative changes patellofemoral and lateral compartments  Past Medical History:   Diagnosis Date   • Arthritis 1995    Chronic problem   • Chronic kidney disease 2020    GFR is low   • Difficulty walking 6/2021    heel pain in left foot   • Fallen arches 2014    Right foot   • Hypertension    • Liver disease 2008    liver cysts   • Plantar fasciitis 11/3/20       Past Surgical History:   Procedure Laterality Date   • TUBAL LIGATION Bilateral        Allergies   Allergen Reactions   • Lansoprazole Diarrhea   • Naproxen    • Nsaids      Other reaction(s): renal dysfunction  Category: Adverse Reaction;    • Sulfa Antibiotics Hives   • Trimethoprim    • Amoxicillin Fever and Rash     Category:  Allergy;        Current Outpatient Medications on File Prior to Visit   Medication Sig Dispense Refill   • Acetaminophen (TYLENOL ARTHRITIS EXT RELIEF PO) Take by mouth     • ASPERCREME LIDOCAINE EX Apply topically     • aspirin (ECOTRIN LOW STRENGTH) 81 mg EC tablet      • bimatoprost (LUMIGAN) 0 01 % ophthalmic drops Apply 1 drop to eye Daily     • brimonidine (ALPHAGAN P) 0 1 % Apply 1 drop to eye every 12 (twelve) hours     • Calcium Carbonate-Vitamin D (CALTRATE 600+D PO) Take 2 tablets by mouth daily      • cholecalciferol (VITAMIN D3) 1,000 units tablet Take 1,000 Units by mouth daily     • Diclofenac Sodium (VOLTAREN) 1 % Apply 2 g topically 4 (four) times a day 100 g 2   • diltiazem (CARDIZEM LA) 240 MG 24 hr tablet Take 1 tablet (240 mg total) by mouth daily 90 tablet 0   • ezetimibe (ZETIA) 10 mg tablet Take 1 tablet (10 mg total) by mouth daily 90 tablet 0   • Multiple Vitamins-Minerals (CENTRUM SILVER ULTRA WOMENS PO) Take 1 tablet by mouth daily     • olmesartan (Benicar) 20 mg tablet Take 1 tablet (20 mg total) by mouth daily 90 tablet 0   • rosuvastatin (CRESTOR) 10 MG tablet Take 1 tablet (10 mg total) by mouth daily 90 tablet 0     No current facility-administered medications on file prior to visit  Social History     Tobacco Use   • Smoking status: Former     Packs/day: 0 25     Years: 15 00     Pack years: 3 75     Types: Cigarettes     Quit date: 10/20/2003     Years since quittin 1   • Smokeless tobacco: Never   • Tobacco comments:     I did quit   Vaping Use   • Vaping Use: Never used   Substance Use Topics   • Alcohol use:  Yes     Alcohol/week: 0 0 standard drinks     Comment: I have a beer or mixed drink when in a restaurant   • Drug use: No       Family History   Problem Relation Age of Onset   • Breast cancer Mother 46   • Heart attack Father    • Coronary artery disease Father    • Arthritis Father            • Heart disease Father            • Colon polyps Brother    • No Known Problems Maternal Grandmother    • No Known Problems Maternal Grandfather    • No Known Problems Paternal Grandmother    • No Known Problems Paternal Grandfather    • No Known Problems Son    • No Known Problems Maternal Aunt    • No Known Problems Maternal Aunt    • No Known Problems Maternal Aunt    • No Known Problems Maternal Aunt    • No Known Problems Maternal Aunt        Review of Systems     As stated in the HPI  All other systems reviewed and are negative        Large joint arthrocentesis: R knee  Procedure Details  Location: knee - R knee  Needle size: 22 G  Ultrasound guidance: no  Approach: anterolateral  Medications administered: 2 mL lidocaine 1 %; 6 mg betamethasone acetate-betamethasone sodium phosphate 6 (3-3) mg/mL    Patient tolerance: patient tolerated the procedure well with no immediate complications  Dressing:  Sterile dressing applied    Hand/upper extremity injection: L thumb A1  Procedure Details  Condition:trigger finger Location: thumb - L thumb A1   Needle size: 25 G  Ultrasound guidance: no  Approach: volar  Medications administered: 1 mL lidocaine 1 %; 6 mg betamethasone acetate-betamethasone sodium phosphate 6 (3-3) mg/mL    Patient tolerance: patient tolerated the procedure well with no immediate complications  Dressing:  Sterile dressing applied

## 2022-12-14 ENCOUNTER — EVALUATION (OUTPATIENT)
Dept: PHYSICAL THERAPY | Facility: REHABILITATION | Age: 82
End: 2022-12-14

## 2022-12-14 DIAGNOSIS — M25.561 ACUTE PAIN OF RIGHT KNEE: ICD-10-CM

## 2022-12-14 NOTE — PROGRESS NOTES
PT Evaluation     Today's date: 2022  Patient name: Kat Barksdale  : 1940  MRN: [id number]  Referring provider: Satnam Fabian*  Dx:   Encounter Diagnosis     ICD-10-CM    1  Acute pain of right knee  M25 561 Ambulatory Referral to Physical Therapy          Start Time: 930  Stop Time: 1020  Total time in clinic (min): 50 minutes    Assessment  Assessment details: Kat Barksdale is a 81y o  year old female with a referred dx of Acute pain of right knee  Pt presents with pain with functional activities such as stair negotiation, getting up from chair, and when first getting out of bed in morning  Upon further clinical testing, pt demonstrates TTP along distal hamstring, decreased hamstring strength, and hamstring/gastroc tightness  Pt would benefit from skilled OP PT to address these, and the below impairments in order to optimize outcomes and promote return to functional baseline  Pt able to demonstrate HEP with good technique and no pain  Educated pt to stop any exercises causing pain increase, pt verbalizes understanding  Impairments: abnormal or restricted ROM, abnormal movement, activity intolerance, impaired physical strength, lacks appropriate home exercise program, pain with function, poor posture  and poor body mechanics    Goals    Short Term Goals: In 4 weeks, the patient will:  1  Decrease worst pain by 2 points for improved QOL  2  Improve knee flexion strength by 1/2 grade for improved LE fx    3  Supervision with HEP for self care  Long Term Goals: In 8 weeks, the patient will:  1  Report less pain with stair negotiation to improve tolerance to functional activities  2  FOTO to greater than predicted value  3  Independent with comprehensive HEP upon discharge    4  Ambulate without limp to meet pt's goal        Plan  Patient would benefit from: skilled physical therapy  Referral necessary: No  Planned modality interventions: cryotherapy  Planned therapy interventions: activity modification, ADL retraining, manual therapy, neuromuscular re-education, patient education, postural training, strengthening, therapeutic activities, stretching, therapeutic exercise, home exercise program, graded exercise, functional ROM exercises, flexibility, body mechanics training and balance  Frequency: 2x week  Duration in weeks: 8  Treatment plan discussed with: patient        Subjective Evaluation    History of Present Illness  Mechanism of injury: Pt reports approx 2 week hx of R knee pain, described as "deep ache " Pt is unsure what caused pain  Pain is worse in morning, worse with descending steps, and getting out of chair  Pain improved with tylenol, heat, and stretching  Pain in morning is stiffness  Pt did have injection from ortho, and notes minimal-no relief  Pt denies back pain, numbness/tingling in RLE  Pain  Current pain ratin  At best pain ratin  At worst pain ratin  Quality: dull ache    Patient Goals  Patient goals for therapy: decreased pain, increased motion, return to sport/leisure activities, independence with ADLs/IADLs and increased strength ("I don't want to limp")          Objective         OBJECTIVE:    Standing Posture & Lower Extremity Alignment:  Structure/Joint         Pelvis (Tilt)  Anterior  Neutral X Posterior   Iliac Crests  Right Elevated X Neutral  Left Elevated   Knee - Frontal  X Genuvalgum  Neutral  Genuvarum   Knee - Sagittal  Genurecurvatum X Neutral       Range of Motion: Goniometric revealed the following findings (in degrees)  Joint Motion Right: 2022 Left: 2022   Hip Extension [facility]   Hip External Rotation [facility]   Hip Internal Rotation [facility] WFL   Knee Extension -3 deg* WFL   Knee Flexion WFL* WFL   Ankle Dorsiflexion 3 5   Ankle Plantarflexion [facility] WFL       Strength: MMT revealed the following findings    Joint Motion Right: 2022 Left: 2022   Hip Flexion 3+/5 4-/5   Hip Abduction 4-/5 4-/5   Hip Extension 4/5 4/5   Knee Extension 5/5 5/5   Knee Flexion 4-/5* 4+/5   Ankle Plantarflexion 5/5 5/5   Ankle Dorsiflexion 5/5 5/5   * indicates pain    Additional Assessments:  Palpation: TTP along distal hamstring tendon insertion, proximal gastroc, neg Jah's sign  Observation: RLE global swelling present at baseline per pt due to varicose veins  Gait Pattern: slight decreased stance time on RLE  Balance: dec on RLE  Joint Mobility: WFL patella  Squat: ant WS, slight pain     Lower quarter screen: unremarkable       Special Tests:  Test (Structure evaluated) Date: 12/14/2022  ( P / N )   Augustin (Iliapsoas/Rectus Fem) + POS   Claire (ITB) neg   90/90 SLR (Hamstring) + POS   Ely (Rectus Femoris) + POS   JOCELYN neg   FADIR neg   Scour neg   Lachman (ACL) neg   Anterior (ACL) neg   Posterior Drawer (PCL) neg   Sag (PCL) neg   Valgus Stress (MCL) neg   Varus Stress (LCL) neg   Apley Compression (Menisci) neg   Tyrone (Menisci) neg   Thessaly (Menisci) neg   Patellar Apprehension   (Patellar Subluxation) neg       Outcome Measures:   FOTO: 58            Pertinent Findings and Outcome Measures:                                                                                                                                                                         Test / Measure  12/14/2022      FOTO 58      DF ROM 3 deg      Knee ext ROM -3 deg      Knee flex MMT 4-/5            Precautions: HTN, Hard of hearing      Manuals 12/14            Manual HS stretch                                                    Neuro Re-Ed             SLS             DL                                                                              Ther Ex             NS             LS gastroc stretch, strap 30" hep            Supine HS stretch, strap 30"x2 hep            Seated HS stretch 30"x2 hep            Stand HR/TR             Stand gastroc stretch             Seated soleus HR             SHC             Tband sang, DF Ther Activity             Tandem walk             Side step on foam             Gait Training                                       Modalities

## 2022-12-16 ENCOUNTER — OFFICE VISIT (OUTPATIENT)
Dept: PHYSICAL THERAPY | Facility: REHABILITATION | Age: 82
End: 2022-12-16

## 2022-12-16 DIAGNOSIS — M25.561 ACUTE PAIN OF RIGHT KNEE: Primary | ICD-10-CM

## 2022-12-16 NOTE — PROGRESS NOTES
Daily Note     Today's date: 2022  Patient name: Arabella Hernandez  : 1940  MRN: [id number]  Referring provider: Rosa Ash*  Dx:   Encounter Diagnosis     ICD-10-CM    1  Acute pain of right knee  M25 561           Start Time: 840  Stop Time: 933  Total time in clinic (min): 53 minutes    Subjective: Pt reports she's been performing stretches, and continues to have "ache" along lateral knee/lower leg  "I'm not limping as much" post-tx  Objective: See treatment diary below      Assessment: Tolerated treatment well  Pt tolerated initial exercises well, VC needed for correct performance, especially during DLS for posterior WS to properly active posterior chain  Pt reports no pain post-tx, does note fatigue  Educated pt on potential for DOMS following first tx, pt verbalizes understanding  Monitor response to initial tx session at f/u  Patient would benefit from continued PT  1:1 with Alessandra Crespo DPT for entirety of tx  Plan: Continue per plan of care        Pertinent Findings and Outcome Measures:                                                                                                                                                                         Test / Measure  2022      FOTO 58      DF ROM 3 deg      Knee ext ROM -3 deg      Knee flex MMT 4-/5            Precautions: HTN, Hard of hearing      Manuals            Manual HS stretch  CM 4'                                                  Neuro Re-Ed             SLS  20"x2           DL  2x8 8#                                                                            Ther Ex             NS  10' L4           LS gastroc stretch, strap 30" hep 30"x2           Supine HS stretch, strap 30"x2 hep            Seated HS stretch 30"x2 hep            Stand HR/TR  2x10 ea           Stand gastroc stretch  30"x2           Seated soleus HR  2x10 15#           [address]  2x10 2#           Tband sang, DF  2x10 ea gtb Ther Activity             Tandem walk  x2 laps fwd           Side step on foam             Gait Training                                       Modalities

## 2022-12-20 ENCOUNTER — OFFICE VISIT (OUTPATIENT)
Dept: PHYSICAL THERAPY | Facility: REHABILITATION | Age: 82
End: 2022-12-20

## 2022-12-20 DIAGNOSIS — M25.561 ACUTE PAIN OF RIGHT KNEE: Primary | ICD-10-CM

## 2022-12-20 NOTE — PROGRESS NOTES
Daily Note     Today's date: 2022  Patient name: Stephen Canela  : 1940  MRN: [id number]  Referring provider: Angus Ingram*  Dx:   Encounter Diagnosis     ICD-10-CM    1  Acute pain of right knee  M25 561           Start Time: 1016  Stop Time: 1059  Total time in clinic (min): 43 minutes    Subjective: Pt reports stiffness following first tx, but improved after 1 day  Objective: See treatment diary below      Assessment: Tolerated treatment well  Pt able to progress several exercises today with good tolerance  Demonstrating improved dynamic balance during tandem walking  Patient would benefit from continued PT  1:1 with Renzo Trinidad DPT for entirety of tx  Plan: Continue per plan of care        Pertinent Findings and Outcome Measures:                                                                                                                                                                         Test / Measure  2022      FOTO 58      DF ROM 3 deg      Knee ext ROM -3 deg      Knee flex MMT 4-/5            Precautions: HTN, Hard of hearing      Manuals           Manual HS stretch  CM 4' CM 4'                                                 Neuro Re-Ed             SLS  20"x2           DL  2x8 8# 2x10 8#                                                                           Ther Ex             NS  10' L4 10' L4          LS gastroc stretch, strap 30" hep 30"x2 np          Supine HS stretch, strap 30"x2 hep            Seated HS stretch 30"x2 hep            Stand HR/TR  2x10 ea 2x10 ea          Stand gastroc stretch  30"x2 30"x3 pro stretch          Seated soleus HR  2x10 15# 2x10 20# KB          SHC  2x10 2# 2x10 3#          Tband sang, DF  2x10 ea gtb 2x10 ea gtb                                                 Ther Activity             Tandem walk  x2 laps fwd x2 laps fwd          Side step on foam             Gait Training Modalities

## 2022-12-22 ENCOUNTER — OFFICE VISIT (OUTPATIENT)
Dept: PHYSICAL THERAPY | Facility: REHABILITATION | Age: 82
End: 2022-12-22

## 2022-12-22 DIAGNOSIS — M25.561 ACUTE PAIN OF RIGHT KNEE: Primary | ICD-10-CM

## 2022-12-22 NOTE — PROGRESS NOTES
Daily Note     Today's date: 2022  Patient name: Val Vargas  : 1940  MRN: [id number]  Referring provider: Lisa Harris*  Dx:   Encounter Diagnosis     ICD-10-CM    1  Acute pain of right knee  M25 561           Start Time: 1009  Stop Time: 1105  Total time in clinic (min): 56 minutes    Subjective: Pt reports pain while driving her car in lateral/posterior knee, improves when she is a passenger  Objective: See treatment diary below      Assessment: Tolerated treatment well  Trialed proximal tib/fib mobilizations due to pt c/o lateral knee pain  Pt had slight improvement in pain and WFL gait following mobilizations  Pt tolerance all additional exercises well  Continue to progress as tolerated  Patient would benefit from continued PT  1:1 with Karen French DPT for entirety of tx  Plan: Continue per plan of care        Pertinent Findings and Outcome Measures:                                                                                                                                                                         Test / Measure  2022      FOTO 58      DF ROM 3 deg      Knee ext ROM -3 deg      Knee flex MMT 4-/5            Precautions: HTN, Hard of hearing      Manuals          Manual HS stretch  CM 4' CM 4' CM 8'         Prox tib/fib mobs    CM 8' Gr 3-4 A-P                                   Neuro Re-Ed             SLS  20"x2           DL  2x8 8# 2x10 8#          Ecc step downs     NV                                                            Ther Ex             NS  10' L4 10' L4 10' L4         LS gastroc stretch, strap 30" hep 30"x2 np          Supine HS stretch, strap 30"x2 hep            Seated HS stretch 30"x2 hep            Stand HR/TR  2x10 ea 2x10 ea          Stand gastroc stretch  30"x2 30"x3 pro stretch 30"x3 pro stretch         Seated soleus HR  2x10 15# 2x10 20# KB          SHC  2x10 2# 2x10 3# 2x10 3#         Sharonda domínguez DF  2x10 ea gtb 2x10 ea gtb 2x10 ea gtb                                                Ther Activity             Tandem walk  x2 laps fwd x2 laps fwd          Side step on foam             Gait Training                                       Modalities

## 2022-12-27 ENCOUNTER — OFFICE VISIT (OUTPATIENT)
Dept: PHYSICAL THERAPY | Facility: REHABILITATION | Age: 82
End: 2022-12-27

## 2022-12-27 DIAGNOSIS — M25.561 ACUTE PAIN OF RIGHT KNEE: Primary | ICD-10-CM

## 2022-12-27 NOTE — PROGRESS NOTES
Daily Note     Today's date: 2022  Patient name: Cyril Aeyrs  : 1940  MRN: [id number]  Referring provider: Digna Manuel*  Dx:   Encounter Diagnosis     ICD-10-CM    1  Acute pain of right knee  M25 561                      Subjective: Pt reports she's had an increase in tightness and "cramping" in back of R knee, needs to occassionally pull over in her car to stretch it out, even on short drives  Objective: See treatment diary below      Assessment: Tolerated treatment well  Patient would benefit from continued PT  Pt responded well to manual stretching  Pt  able to complete all exercises with no increase in pain during or after session  Pt  1:1 with PTA for entirety  Plan: Continue per plan of care  Pertinent Findings and Outcome Measures:                                                                                                                                                                         Test / Measure  2022      FOTO 58      DF ROM 3 deg      Knee ext ROM -3 deg      Knee flex MMT 4-/5            Precautions: HTN, Hard of hearing      Manuals         Manual HS stretch  CM 4' CM 4' CM 8' KP 8'        Prox tib/fib mobs    CM 8' Gr 3-4 A-P                                   Neuro Re-Ed             SLS  20"x2           DL  2x8 8# 2x10 8#          Ecc step downs     2x10 6"                                                            Ther Ex             NS  10' L4 10' L4 10' L4 10' L4        LS gastroc stretch, strap 30" hep 30"x2 np          Supine HS stretch, strap 30"x2 hep            Seated HS stretch 30"x2 hep            Stand HR/TR  2x10 ea 2x10 ea          Stand gastroc stretch  30"x2 30"x3 pro stretch 30"x3 pro stretch 3x30" prostr          Seated soleus HR  2x10 15# 2x10 20# KB          [address]  2x10 2# 2x10 3# 2x10 3# 2x10 b/l 3#        Tband sang, DF  2x10 ea gtb 2x10 ea gtb 2x10 ea gtb 2x10 ea gtb Ther Activity             Tandem walk  x2 laps fwd x2 laps fwd  3 laps mirror        Side step on foam             Gait Training                                       Modalities

## 2022-12-29 ENCOUNTER — OFFICE VISIT (OUTPATIENT)
Dept: PHYSICAL THERAPY | Facility: REHABILITATION | Age: 82
End: 2022-12-29

## 2022-12-29 DIAGNOSIS — M25.561 ACUTE PAIN OF RIGHT KNEE: Primary | ICD-10-CM

## 2022-12-29 NOTE — PROGRESS NOTES
Daily Note     Today's date: 2022  Patient name: Pj Hedrick  : 1940  MRN: [id number]  Referring provider: Carolyn Rashid*  Dx:   Encounter Diagnosis     ICD-10-CM    1  Acute pain of right knee  M25 561                      Subjective: Pt reports some relief after last session, states that she continues to have cramps in the middle of the night that wake her from sleep  Objective: See treatment diary below      Assessment: Tolerated treatment well  Patient would benefit from continued PT  Pt exhibits propensity for guarding during stretching, likely leading to diminished results in clinic and at home  Pt educated on importance of muscle relaxation for stretching effectiveness and decreasing overall tonicity in future, demonstrated understanding  Pt  able to complete all exercises with no increase in pain during or after session  Pt reports feeling some improvement at end of session  Pt  1:1 with PTA for entirety  Plan: Continue per plan of care        Pertinent Findings and Outcome Measures:                                                                                                                                                                         Test / Measure  2022      FOTO 58      DF ROM 3 deg      Knee ext ROM -3 deg      Knee flex MMT 4-/5            Precautions: HTN, Hard of hearing      Manuals        Manual HS stretch  CM 4' CM 4' CM 8' KP 8' KP 8'       Prox tib/fib mobs    CM 8' Gr 3-4 A-P                                   Neuro Re-Ed             SLS  20"x2           DL  2x8 8# 2x10 8#          Ecc step downs     2x10 6" 2x10 6"                                                           Ther Ex             NS  10' L4 10' L4 10' L4 10' L4 10' L4       LS gastroc stretch, strap 30" hep 30"x2 np          Supine HS stretch, strap 30"x2 hep            Seated HS stretch 30"x2 hep            Stand HR/TR  2x10 ea 2x10 ea Stand gastroc stretch  30"x2 30"x3 pro stretch 30"x3 pro stretch 3x30" prostr  3x30" prostr         Seated soleus HR  2x10 15# 2x10 20# KB          [address]  2x10 2# 2x10 3# 2x10 3# 2x10 b/l 3# 2x10 b/l 3#       Tband sang, DF  2x10 ea gtb 2x10 ea gtb 2x10 ea gtb 2x10 ea gtb 20x ea gtb                                              Ther Activity             Tandem walk  x2 laps fwd x2 laps fwd  3 laps mirror 3 laps mirror       Side step on foam             Gait Training                                       Modalities

## 2023-01-03 ENCOUNTER — OFFICE VISIT (OUTPATIENT)
Dept: PHYSICAL THERAPY | Facility: REHABILITATION | Age: 83
End: 2023-01-03

## 2023-01-03 DIAGNOSIS — M25.561 ACUTE PAIN OF RIGHT KNEE: Primary | ICD-10-CM

## 2023-01-03 NOTE — PROGRESS NOTES
Daily Note     Today's date: 1/3/2023  Patient name: Theodore Ricks  : 1940  MRN: [id number]  Referring provider: Raffi Garcia*  Dx:   Encounter Diagnosis     ICD-10-CM    1  Acute pain of right knee  M25 561           Start Time: 1005  Stop Time: 1059  Total time in clinic (min): 54 minutes    Subjective: Pt reports pain continues, especially with sleep and prolonged sitting  Objective: See treatment diary below    Repeated motions:    L/s flex, sx peripheralized down to ankle   L/s ext, decreased knee/hip pain  Slump test: positive on R      Assessment: Tolerated treatment well  Pt has demonstrated minimal carryover of pain relief outside of tx, and continues to report pain with L/s flexion positioning  Per objective testing performed today, pain appears to be radicular  Updated hep as indicated below for L/s ext based exercises, pt able to demonstrate with good technique and no pain  Monitor response at f/u  Patient would benefit from continued PT  1:1 with Erica Carson DPT for entirety of tx  Plan: Continue per plan of care        Pertinent Findings and Outcome Measures:                                                                                                                                                                         Test / Measure  2022      FOTO 58      DF ROM 3 deg      Knee ext ROM -3 deg      Knee flex MMT 4-/5            Precautions: HTN, Hard of hearing      Manuals 12/14 12/16 12/20 12/22 12/27 12/29 1/3    Manual HS stretch  CM 4' CM 4' CM 8' KP 8' KP 8'     Prox tib/fib mobs    CM 8' Gr 3-4 A-P                             Neuro Re-Ed           SLS  20"x2         DL  2x8 8# 2x10 8#    NV    Ecc step downs     2x10 6" 2x10 6"     Sciatic nerve glides       2x8 90/90                                     Ther Ex           NS  10' L4 10' L4 10' L4 10' L4 10' L4 5' L4    LS gastroc stretch, strap 30" hep 30"x2 np        Supine HS stretch, strap 30"x2 hep          Seated HS stretch 30"x2 hep          Stand HR/TR  2x10 ea 2x10 ea        Stand gastroc stretch  30"x2 30"x3 pro stretch 30"x3 pro stretch 3x30" prostr  3x30" prostr       Seated soleus HR  2x10 15# 2x10 20# KB        [address]  2x10 2# 2x10 3# 2x10 3# 2x10 b/l 3# 2x10 b/l 3#     Tband sang, DF  2x10 ea gtb 2x10 ea gtb 2x10 ea gtb 2x10 ea gtb 20x ea gtb     Bridge        2x10 on pball    L/s ext       2x10 PPU on elbows hep    x10 stand at wall    Prone hip ext       2x5 B    Stand hip ext       2x10 B 5#               STSs       NV    Ther Activity           Tandem walk  x2 laps fwd x2 laps fwd  3 laps mirror 3 laps mirror     Side stepping       x3 laps gtb    Gait Training                                 Modalities

## 2023-01-05 ENCOUNTER — OFFICE VISIT (OUTPATIENT)
Dept: PHYSICAL THERAPY | Facility: REHABILITATION | Age: 83
End: 2023-01-05

## 2023-01-05 DIAGNOSIS — M25.561 ACUTE PAIN OF RIGHT KNEE: Primary | ICD-10-CM

## 2023-01-05 NOTE — PROGRESS NOTES
Daily Note     Today's date: 2023  Patient name: Ariel Arteaga  : 1940  MRN: [id number]  Referring provider: Carmen Rivas*  Dx:   Encounter Diagnosis     ICD-10-CM    1  Acute pain of right knee  M25 561           Start Time: 1004  Stop Time: 1100  Total time in clinic (min): 56 minutes    Subjective: Pt reports pain continues with driving and in middle of night, mostly posterior/lateral knee and sometimes down to ankle  Objective: See treatment diary below        Assessment: Tolerated treatment well  Spent majority of session educated pt on potential neural tension pathology causing increased pain, as well as directional preference for L/s ext with centralization of sx by end of session  Educated pt to continue with updated hep due to ext-based exercises for ongoing relief at home and with sleep, pt verbalizes understanding  Pt reports relief post-tx compared to start of session  Patient would benefit from continued PT  1:1 with Moe Toth, DPT for entirety of tx  Plan: Continue per plan of care        Pertinent Findings and Outcome Measures:                                                                                                                                                                         Test / Measure  2022      FOTO 58      DF ROM 3 deg      Knee ext ROM -3 deg      Knee flex MMT 4-/5            Precautions: HTN, Hard of hearing      Manuals 12/14 12/16 12/20 12/22 12/27 12/29 1/3 1/5   Manual HS stretch  CM 4' CM 4' CM 8' KP 8' KP 8'  OP with PPU on elbows 2x10   Prox tib/fib mobs    CM 8' Gr 3-4 A-P                             Neuro Re-Ed           SLS  20"x2         DL  2x8 8# 2x10 8#    NV    Ecc step downs     2x10 6" 2x10 6"     Sciatic nerve glides       2x8 90/90 2x8 90/90                                    Ther Ex           NS  10' L4 10' L4 10' L4 10' L4 10' L4 5' L4 10' L6   LS gastroc stretch, strap 30" hep 30"x2 np        Supine HS stretch, strap 30"x2 hep          Seated HS stretch 30"x2 hep          Stand HR/TR  2x10 ea 2x10 ea        Stand gastroc stretch  30"x2 30"x3 pro stretch 30"x3 pro stretch 3x30" prostr  3x30" prostr       Seated soleus HR  2x10 15# 2x10 20# KB        [address]  2x10 2# 2x10 3# 2x10 3# 2x10 b/l 3# 2x10 b/l 3#     Tband sang, DF  2x10 ea gtb 2x10 ea gtb 2x10 ea gtb 2x10 ea gtb 20x ea gtb     Bridge        2x10 on pball    L/s ext       2x10 PPU on elbows hep    x10 stand at wall 2x10 PPU on elbows   Prone hip ext       2x5 B    Stand hip ext       2x10 B 5#               STSs       NV    Pt EDU        10' hep   Ther Activity           Tandem walk  x2 laps fwd x2 laps fwd  3 laps mirror 3 laps mirror     Side stepping       x3 laps gtb    Gait Training                                 Modalities

## 2023-01-10 ENCOUNTER — OFFICE VISIT (OUTPATIENT)
Dept: PHYSICAL THERAPY | Facility: REHABILITATION | Age: 83
End: 2023-01-10

## 2023-01-10 DIAGNOSIS — M25.561 ACUTE PAIN OF RIGHT KNEE: Primary | ICD-10-CM

## 2023-01-10 NOTE — PROGRESS NOTES
Daily Note     Today's date: 1/10/2023  Patient name: Uyen Bunch  : 1940  MRN: [id number]  Referring provider: Maggie Porter*  Dx:   Encounter Diagnosis     ICD-10-CM    1  Acute pain of right knee  M25 561           Start Time: 1018  Stop Time: 1100  Total time in clinic (min): 42 minutes    Subjective: Pt reports overall improvement when getting up from sitting and in middle of night  Pt continues to note pain with driving  Feels her new HEP is helping  Objective: See treatment diary below        Assessment: Tolerated treatment well  Pt able to modulate symptoms today much easier with partial or complete relief with repeated ext  Discussed with pt plan to progress strengthening as tolerated for long-term relief, and return to L/s repeated ext as needed during session and at home  Progress as tolerated  Patient would benefit from continued PT  1:1 with Shanell Henao DPT for entirety of tx  Plan: Continue per plan of care  Pertinent Findings and Outcome Measures:                                                                                                                                                                         Test / Measure  2022      FOTO 58      DF ROM 3 deg      Knee ext ROM -3 deg      Knee flex MMT 4-/5            Precautions: HTN, Hard of hearing      Manuals 12/22 12/27 12/29 1/3 1/5 1/10   Manual HS stretch CM 8' KP 8' KP 8'  OP with PPU on elbows 2x10 OP with PPU on elbows x10   Prox tib/fib mobs CM 8' Gr 3-4 A-P                          Neuro Re-Ed         SLS         DL    NV  2x10 5#   Ecc step downs  2x10 6" 2x10 6"      Sciatic nerve glides    2x8 90/90 2x8 90/90 x10 90/90 (pain)                              Ther Ex         NS 10' L4 10' L4 10' L4 5' L4 10' L6 10' L4   LS gastroc stretch, strap         Supine HS stretch, strap         Seated HS stretch         Stand HR/TR         Stand gastroc stretch 30"x3 pro stretch 3x30" prostr  3x30" prostr        Seated soleus HR         [address] 2x10 3# 2x10 b/l 3# 2x10 b/l 3#      Tband sang, DF 2x10 ea gtb 2x10 ea gtb 20x ea gtb      Bridge     2x10 on pball  2x10 on pball   L/s ext    2x10 PPU on elbows hep    x10 stand at wall 2x10 PPU on elbows x10 PPU on elbows    2x10 stand at wall   Prone hip ext    2x5 B     Stand hip ext    2x10 B 5#  NV            STSs    NV  2x10 chair +foam   Pt EDU     10' hep    Ther Activity         Tandem walk  3 laps mirror 3 laps mirror      Side stepping    x3 laps gtb  NV   Gait Training                           Modalities

## 2023-01-12 ENCOUNTER — EVALUATION (OUTPATIENT)
Dept: PHYSICAL THERAPY | Facility: REHABILITATION | Age: 83
End: 2023-01-12

## 2023-01-12 DIAGNOSIS — M25.561 ACUTE PAIN OF RIGHT KNEE: Primary | ICD-10-CM

## 2023-01-12 NOTE — PROGRESS NOTES
PT Re-Evaluation    Today's date: 2023  Patient name: Carson Ewing  : 1940  MRN: [id number]  Referring provider: Riddhi Gonzalez  Dx:   Encounter Diagnosis     ICD-10-CM    1  Acute pain of right knee  M25 561           Start Time: 1015  Stop Time: 1105  Total time in clinic (min): 50 minutes    Subjective: Pt reports slight improvement with PT in recent weeks due to recently updated hep targeting repeated L/s ext  Pt continues to report pain with prolonged sitting in car or at home, and when getting OOB in middle of the night  Pain  Current pain ratin  At best pain ratin  At worst pain ratin  Quality: dull ache       Objective: See treatment diary below    Range of Motion: Goniometric revealed the following findings (in degrees)  Joint Motion Right:  Left:    Hip Extension [facility]   Hip External Rotation [facility]   Hip Internal Rotation [facility]   Knee Extension -3 deg* WFL   Knee Flexion WFL* WFL   Ankle Dorsiflexion 3 5   Ankle Plantarflexion [facility] WFL       Strength: MMT revealed the following findings  Joint Motion Right:  Left:    Hip Flexion 4-/5* 4-/5   Hip Abduction 4-/5 4-/5   Hip Extension 4/5 4/5   Knee Extension 5/5 5/5   Knee Flexion 4/5* 4+/5   Ankle Plantarflexion 5/5 5/5   Ankle Dorsiflexion 5/5 5/5   * indicates pain    Additional Assessments:  Palpation: TTP along distal hamstring tendon insertion, proximal gastroc, neg Jah's sign  Observation: RLE global swelling present at baseline per pt due to varicose veins  Gait Pattern: slight decreased stance time on RLE  Balance: dec on RLE  Joint Mobility: WFL patella  Squat: ant WS, slight pain           Assessment: Carson Ewing is a 81y o  year old female with a referred dx of Acute pain of right knee  Pt has demonstrated improvements in overall strength and pain levels  Pt continues to have pain with getting OOB at night and with prolonged sitting with driving   Pain continues to be positional and is altered with L/s ext and LE neural mobilization  Pt would continue to benefit from skilled OP PT to address these, and the below impairments in order to optimize outcomes and promote return to functional baseline  Impairments: abnormal or restricted ROM, abnormal movement, activity intolerance, impaired physical strength, lacks appropriate home exercise program, pain with function, poor posture  and poor body mechanics    Goals  Short Term Goals: In 4 weeks, the patient will:  1  Decrease worst pain by 2 points for improved QOL  - not met  2  Improve knee flexion strength by 1/2 grade for improved LE fx  - partially met   3  Supervision with HEP for self care  - met    Long Term Goals: In 8 weeks, the patient will:  1  Report less pain with stair negotiation to improve tolerance to functional activities  - met  2  FOTO to greater than predicted value  - not met  3  Independent with comprehensive HEP upon discharge  - not met  4  Ambulate without limp to meet pt's goal  - not met            Plan: Continue per plan of care  per previously established POC, 2x/week  Precautions: HTN, Hard of hearing      Manuals 12/29 1/3 1/5 1/10 1/12   Manual HS stretch KP 8'  OP with PPU on elbows 2x10 OP with PPU on elbows x10 np   Prox tib/fib mobs                        Neuro Re-Ed        SLS        DL  NV  2x10 5# NV   Ecc step downs 2x10 6"       Sciatic nerve glides  2x8 90/90 2x8 90/90 x10 90/90 (pain) Seated 2x10 hep    2x10 supine 90/90   Seated HS pulls     NV                   Ther Ex        NS 10' L4 5' L4 10' L6 10' L4 5' L5   LS gastroc stretch, strap        Supine HS stretch, strap        Seated HS stretch        Stand HR/TR        Stand gastroc stretch 3x30" prostr         Seated soleus HR        SHC 2x10 b/l 3#       Tband sang, DF 20x ea gtb       Bridge   2x10 on pball  2x10 on pball    L/s ext  2x10 PPU on elbows hep    x10 stand at wall 2x10 PPU on elbows x10 PPU on elbows    2x10 stand at wall    Prone hip ext  2x5 B      Stand hip ext  2x10 B 5#  NV            STSs  NV  2x10 chair +foam    Seated PF stretch     30"x2 B hep   Pt EDU   10' hep  10' hep   Ther Activity        Tandem walk 3 laps mirror       Side stepping  x3 laps gtb  NV NV   Step ups     2x10 R 6" fwd   Gait Training                        Modalities

## 2023-01-17 ENCOUNTER — OFFICE VISIT (OUTPATIENT)
Dept: PHYSICAL THERAPY | Facility: REHABILITATION | Age: 83
End: 2023-01-17

## 2023-01-17 DIAGNOSIS — M25.561 ACUTE PAIN OF RIGHT KNEE: Primary | ICD-10-CM

## 2023-01-17 NOTE — PROGRESS NOTES
Daily Note     Today's date: 2023  Patient name: Camila Mcdonald  : 1940  MRN: [id number]  Referring provider: Kera Spence  Dx:   Encounter Diagnosis     ICD-10-CM    1  Acute pain of right knee  M25 561           Start Time: 923  Stop Time:   Total time in clinic (min): 39 minutes    Subjective: Pt reports pain continues overall, especially with sleep and driving positions  Pt unsure if hep is helping pain  Objective: See treatment diary below      Assessment: Tolerated treatment well  Pt does note relief with proximal tib/fib mobs, demonstrating improved knee flexion ROM and HS activation following with less c/o pain  Continue to progress as tolerated with future tx's, hold on repeated L/s ext due to lacking of progress at home  No pain recreation or relief with repeated L/s ext or PAVIM testing to L/s, sacrum today  Patient would benefit from continued PT  1:1 with Negro Cole DPT for entirety of tx  Plan: Continue per plan of care  Precautions: HTN, Hard of hearing      Manuals 12/29 1/3 1/5 1/10 1/12 1/17   Manual HS stretch KP 8'  OP with PPU on elbows 2x10 OP with PPU on elbows x10 np CM 10', +quad and PF   Prox tib/fib mobs      CM 10'                     Neuro Re-Ed         SLS         DL  NV  2x10 5# NV    Ecc step downs 2x10 6"        Sciatic nerve glides  2x8 90/90 2x8 90/90 x10 90/90 (pain) Seated 2x10 hep    2x10 supine 90/90 2x10 seated   Seated HS pulls     NV                      Ther Ex         NS 10' L4 5' L4 10' L6 10' L4 5' L5 np   LS gastroc stretch, strap         Supine HS stretch, strap         Seated HS stretch         Stand HR/TR         Stand gastroc stretch 3x30" prostr          Seated soleus HR         SHC 2x10 b/l 3#        Tband sang, DF 20x ea gtb        Bridge   2x10 on pball  2x10 on pball     L/s ext  2x10 PPU on elbows hep    x10 stand at wall 2x10 PPU on elbows x10 PPU on elbows    2x10 stand at wall  held   Prone hip ext 2x5 B       Stand hip ext  2x10 B 5#  NV              STSs  NV  2x10 chair +foam     Seated PF stretch     30"x2 B hep    Pt EDU   10' hep  10' hep 10' hep   Ther Activity         Tandem walk 3 laps mirror        Side stepping  x3 laps gtb  NV NV    Step ups     2x10 R 6" fwd    Gait Training                           Modalities

## 2023-01-19 ENCOUNTER — OFFICE VISIT (OUTPATIENT)
Dept: PHYSICAL THERAPY | Facility: REHABILITATION | Age: 83
End: 2023-01-19

## 2023-01-19 DIAGNOSIS — M25.561 ACUTE PAIN OF RIGHT KNEE: Primary | ICD-10-CM

## 2023-01-19 NOTE — PROGRESS NOTES
Daily Note     Today's date: 2023  Patient name: Francine Johnson  : 1940  MRN: [id number]  Referring provider: Fatmata Dennis*  Dx:   Encounter Diagnosis     ICD-10-CM    1  Acute pain of right knee  M25 561           Start Time: 955  Stop Time:   Total time in clinic (min): 23 minutes    Subjective: Pt reports pain continues mostly with prolonged sitting in car and when getting OOB at night to use restroom  Pain is "stiffness" in knee and down to foot, improves with inversion stretch of foot  Objective: See treatment diary below      Assessment: Tolerated treatment well  Increased focus today on TKE strength and ROM, with minimal focus on potential radicular sx due to inconsistent results with previous tx's  Pt continues to note relief with manual therapy, with also improves hip flexion strength  Pt does continue to demonstrate limited TKE with standing and ambulation  Pt scheduled to f/u with ortho next week  Monitor response at f/u  Patient would benefit from continued PT  1:1 with Justin Neff DPT for entirety of tx  Plan: Continue per plan of care  Precautions: HTN, Hard of hearing      Manuals 12/29 1/3 1/5 1/10 1/12 1/17 1/19   Manual HS stretch KP 8'  OP with PPU on elbows 2x10 OP with PPU on elbows x10 np CM 10', +quad and PF CM 5'   Prox tib/fib mobs      CM 10' CM 10', +P-A knee mobs Gr 3-4                       Neuro Re-Ed          SLS          DL  NV  2x10 5# NV     Ecc step downs 2x10 6"         Sciatic nerve glides  2x8 90/90 2x8 90/90 x10 90/90 (pain) Seated 2x10 hep    2x10 supine 90/90 2x10 seated    Seated HS pulls     NV     TR at wall w/ TKE       2x10             Ther Ex          NS 10' L4 5' L4 10' L6 10' L4 5' L5 np    LS gastroc stretch, strap          Supine HS stretch, strap          Seated HS stretch          Stand HR/TR          Stand gastroc stretch 3x30" prostr           Seated soleus HR          [address] 2x10 b/l 3#         Tband sang, DF 20x ea gtb         Bridge   2x10 on pball  2x10 on pball      L/s ext  2x10 PPU on elbows hep    x10 stand at wall 2x10 PPU on elbows x10 PPU on elbows    2x10 stand at wall  held    Prone hip ext  2x5 B        Stand hip ext  2x10 B 5#  NV                STSs  NV  2x10 chair +foam   2x6 chair +foam, R sonya back   Stand marches       2x10 2#   Seated PF stretch     30"x2 B hep     Pt EDU   10' hep  10' hep 10' hep    LLLD knee ext       1'x2, x10 TKE with bolster under heel   Ther Activity          Tandem walk 3 laps mirror         Side stepping  x3 laps gtb  NV NV     Step ups     2x10 R 6" fwd     Gait Training                              Modalities

## 2023-01-24 ENCOUNTER — OFFICE VISIT (OUTPATIENT)
Dept: PHYSICAL THERAPY | Facility: REHABILITATION | Age: 83
End: 2023-01-24

## 2023-01-24 ENCOUNTER — OFFICE VISIT (OUTPATIENT)
Dept: OBGYN CLINIC | Facility: CLINIC | Age: 83
End: 2023-01-24

## 2023-01-24 VITALS
WEIGHT: 167 LBS | DIASTOLIC BLOOD PRESSURE: 64 MMHG | SYSTOLIC BLOOD PRESSURE: 118 MMHG | BODY MASS INDEX: 26.84 KG/M2 | HEIGHT: 66 IN

## 2023-01-24 DIAGNOSIS — M17.11 PRIMARY OSTEOARTHRITIS OF ONE KNEE, RIGHT: Primary | ICD-10-CM

## 2023-01-24 DIAGNOSIS — M25.561 ACUTE PAIN OF RIGHT KNEE: Primary | ICD-10-CM

## 2023-01-24 DIAGNOSIS — M25.461 EFFUSION OF RIGHT KNEE: ICD-10-CM

## 2023-01-24 NOTE — PROGRESS NOTES
Daily Note     Today's date: 2023  Patient name: Deon Storey  : 1940  MRN: [id number]  Referring provider: Philip Barrett*  Dx:   Encounter Diagnosis     ICD-10-CM    1  Acute pain of right knee  M25 561                      Subjective: Patient reports that she continues to struggle with sitting tolerance in the car due to R lateral knee and calf pain  She also has a PMH positive for peripheral arterial disease, and varicose veins  Objective: See treatment diary below    RFIS: no effect; no effect  SATNAM: no effect; no effect  RFIL: no effect; no effect  Prone on elbows: no effect; no effect  Prone on elbows (table elevated): decrease (posterior knee 'stiffness'); better      Pain with knee extension and flexion end range OP    Ankle strength:   DF: WNL  Eversion: WNL  Inversion: 3+/5    Assessment: Patient noted a slight improvement in knee "stiffness" with elevated prone on elbows sustained position  Overall her symptom intensity was low at baseline, it is unclear if this is a clinically significant change  Additionally patient does demonstrate a positive passive SLR on the R, which primarily elicits calf and medial foot symptoms  This does correlate with her hx of tarsal tunnel syndrome, which makes it unclear if the neural tension is related to the spine  Based on the irritability of the knee symptoms with end range over pressure, regardless of position of the spine, there is likely knee joint orthopedic source of pain  Her primary complaint of knee "stiffness" and pain while driving is consistent with sustained position of the knee joint or the spine being the primary cause  She gets relief of symptoms by changing the position of her knee, without standing up, suggesting that the spine is likely not the primary source of symptoms  Patient was given sustained prone on elbows 2x per day with specific instruction to perform right before bed   She will track her symptoms when getting up in the middle of the night to use the bathroom to determine if this exercise is changing her symptoms  She will be seeing an orthopedic physician for a consult about her knee this afternoon and will follow up with any changes to her plan of care  Plan: Continue per plan of care  Precautions: HTN, Hard of hearing      Manuals 12/29 1/3 1/5 1/10 1/12 1/17 1/19 1/24   Manual HS stretch KP 8'  OP with PPU on elbows 2x10 OP with PPU on elbows x10 np CM 10', +quad and PF CM 5'    Prox tib/fib mobs      CM 10' CM 10', +P-A knee mobs Gr 3-4    Objective testing        TB              Neuro Re-Ed           SLS           DL  NV  2x10 5# NV      Ecc step downs 2x10 6"          Sciatic nerve glides  2x8 90/90 2x8 90/90 x10 90/90 (pain) Seated 2x10 hep    2x10 supine 90/90 2x10 seated     Seated HS pulls     NV      TR at wall w/ TKE       2x10    Education        HEP updates   Ther Ex           NS 10' L4 5' L4 10' L6 10' L4 5' L5 np     LS gastroc stretch, strap           Supine HS stretch, strap           Seated HS stretch           Stand HR/TR           Stand gastroc stretch 3x30" prostr            Seated soleus HR           SHC 2x10 b/l 3#          Tband sang, DF 20x ea gtb          Bridge   2x10 on pball  2x10 on pball       L/s ext  2x10 PPU on elbows hep    x10 stand at wall 2x10 PPU on elbows x10 PPU on elbows    2x10 stand at wall  held     Prone hip ext  2x5 B         Stand hip ext  2x10 B 5#  NV                  STSs  NV  2x10 chair +foam   2x6 chair +foam, R sonya back    Stand marches       2x10 2#    Seated PF stretch     30"x2 B hep      Pt EDU   10' hep  10' hep 10' hep     LLLD knee ext       1'x2, x10 TKE with bolster under heel    Prone on elbows        2x3 min; with table elevated; HEP   EIS        2x10   Ther Activity           Tandem walk 3 laps mirror          Side stepping  x3 laps gtb  NV NV      Step ups     2x10 R 6" fwd      Gait Training                                 Modalities

## 2023-01-24 NOTE — PROGRESS NOTES
[facility] [place]  [address]  [address] [address] [phone]      Chief Complaint:  Chief Complaint   Patient presents with   • Right Knee - Pain     Pain a 10 at night when need to get up to go to the bathroom, will be stiff  When driving will get pain  Pt does not feel PT is helping  Vitals:  /64   Ht 5' 6" (1 676 m)   Wt 75 8 kg (167 lb)   BMI 26 95 kg/m²     The following portions of the patient's history were reviewed and updated as appropriate: allergies, current medications, past family history, past medical history, past social history, past surgical history, and problem list       Subjective:   Patient ID: Francine Johnson is a 81 y o  female  Here for f/u R knee pain  Still having pain  Injection didn't help  Hurts to walk  Feel stiffness  Worse after laying for a while  Limping at times  swelling      Review of Systems   Constitutional: Negative for fatigue and fever  Respiratory: Negative for shortness of breath  Cardiovascular: Negative for chest pain  Gastrointestinal: Negative for abdominal pain and nausea  Genitourinary: Negative for dysuria  Musculoskeletal: Positive for arthralgias  Skin: Negative for rash and wound  Neurological: Negative for weakness and headaches  Objective:  Right Knee Exam     Tenderness   The patient is experiencing no tenderness  Range of Motion   Extension: normal   Flexion: abnormal     Tests   Tyrone:  Medial - positive Lateral - positive    Other   Swelling: mild  Effusion: effusion present          Observations     Right Knee   Positive for effusion  Physical Exam  Constitutional:       Appearance: Normal appearance  She is normal weight  HENT:      Head: Normocephalic  Eyes:      Extraocular Movements: Extraocular movements intact  Pulmonary:      Effort: Pulmonary effort is normal    Musculoskeletal:      Cervical back: Normal range of motion        Right knee: Effusion present  Instability Tests: Medial Tyrone test positive and lateral Tyrone test positive  Skin:     General: Skin is warm and dry  Neurological:      General: No focal deficit present  Mental Status: She is alert and oriented to person, place, and time  Mental status is at baseline  Psychiatric:         Mood and Affect: Mood normal          Behavior: Behavior normal          Thought Content: Thought content normal          Judgment: Judgment normal                Assessment/Plan:  Assessment/Plan   Diagnoses and all orders for this visit:    Primary osteoarthritis of one knee, right  -     Injection Procedure Prior Authorization; Future    Effusion of right knee  -     Injection Procedure Prior Authorization; Future        Return for f/u for visco injection, Recheck       Nichelle Edmonds MD

## 2023-01-24 NOTE — PATIENT INSTRUCTIONS
F/u for visco injection  Will need aspiration also  Icing/heat/OTC pain meds as needed    Continue therapy

## 2023-01-26 ENCOUNTER — OFFICE VISIT (OUTPATIENT)
Dept: PHYSICAL THERAPY | Facility: REHABILITATION | Age: 83
End: 2023-01-26

## 2023-01-26 DIAGNOSIS — M25.561 ACUTE PAIN OF RIGHT KNEE: Primary | ICD-10-CM

## 2023-01-26 NOTE — PROGRESS NOTES
Daily Note     Today's date: 2023  Patient name: Toña Alfaro  : 1940  MRN: [id number]  Referring provider: Sean Quintana*  Dx:   Encounter Diagnosis     ICD-10-CM    1  Acute pain of right knee  M25 561           Start Time: 932  Stop Time:   Total time in clinic (min): 48 minutes    Subjective: Patient returned to ortho for f/u, scheduled for injection series in knee  Objective: See treatment diary below        Assessment: Pt tolerated tx well  Pt notes less c/o knee stiffness following manual therapy, and demonstrates improved ext ROM  Increased emphasis today on knee strengthening with good tolerance  Pt reports fatigue post-tx and less c/o stiffness  Monitor response at f/u  Pt would continue to benefit from formal PT  1:1 with Briseyda Duran DPT for entirety of tx  Plan: Continue per plan of care            Precautions: HTN, Hard of hearing      Manuals    Manual HS stretch CM 5'  CM 5'   Prox tib/fib mobs CM 10', +P-A knee mobs Gr 3-4  CM 10', +P-A knee mobs Gr 3-4   Objective testing  TB          Neuro Re-Ed      SLS      DL   NV   Ecc step downs      Sciatic nerve glides      Seated HS pulls      TR at wall w/ TKE 2x10  NV   Education  HEP updates    Ther Ex      NS      LS gastroc stretch, strap      Supine HS stretch, strap      Seated HS stretch      Stand HR/TR      Stand gastroc stretch      Seated soleus HR      SHC   2x10 5#   Tband sang, DF      Bridge       L/s ext      Prone hip ext      Stand hip ext            STSs 2x6 chair +foam, R sonya back  2x8 chair +foam, R sonya back   Stand marches 2x10 2#     Seated PF stretch      Pt EDU      LLLD knee ext 1'x2, x10 TKE with bolster under heel     Prone on elbows  2x3 min; with table elevated; HEP    EIS  2x10    LAQ   2x10 R 5#   QS+ SLR   5"x10 R   Leg press   NV   TKE   Seated, supine hep   Ther Activity      Tandem walk      Side stepping      Step ups   NV   Gait Training Modalities

## 2023-01-31 ENCOUNTER — OFFICE VISIT (OUTPATIENT)
Dept: PHYSICAL THERAPY | Facility: REHABILITATION | Age: 83
End: 2023-01-31

## 2023-01-31 DIAGNOSIS — M25.561 ACUTE PAIN OF RIGHT KNEE: Primary | ICD-10-CM

## 2023-01-31 NOTE — PROGRESS NOTES
Daily Note     Today's date: 2023  Patient name: Ora Kinney  : 1940  MRN: [id number]  Referring provider: Lili Cancino*  Dx:   Encounter Diagnosis     ICD-10-CM    1  Acute pain of right knee  M25 561           Start Time: 1153  Stop Time: 1232  Total time in clinic (min): 39 minutes    Subjective: Patient reports overall improvement in c/o stiffness, notes went on longer car ride and still had pain but didn't have to pull over to stretch  Objective: See treatment diary below        Assessment: Pt tolerated tx well  Pt continues to report relief following manual therapy, less c/o stiffness overall and with walking  Continues to have pain/difficulty with descending steps due to eccentric quad weakness  Pt continues to progress LE strength well  Pt would continue to benefit from formal PT  1:1 with Fday Mcknight DPT for entirety of tx  Plan: Continue per plan of care            Precautions: HTN, Hard of hearing      Manuals    Manual HS stretch CM 5'  CM 5' CM 5'   Prox tib/fib mobs CM 10', +P-A knee mobs Gr 3-4  CM 10', +P-A knee mobs Gr 3-4 CM 10', +P-A knee mobs Gr 3-4   Objective testing  TB            Neuro Re-Ed       SLS       DL   NV    Ecc step downs       Sciatic nerve glides       Seated HS pulls       TR at wall w/ TKE 2x10  NV 2x10   Education  HEP updates     Ther Ex       NS    10' L5   LS gastroc stretch, strap       Supine HS stretch, strap       Seated HS stretch       Stand HR/TR       Stand gastroc stretch       Seated soleus HR       SHC   2x10 5# 2x10 5#   Tband sang, DF       Bridge        L/s ext       Prone hip ext       Stand hip ext              STSs 2x6 chair +foam, R sonya back  2x8 chair +foam, R sonya back    Stand marches 2x10 2#      Seated PF stretch       Pt EDU       LLLD knee ext 1'x2, x10 TKE with bolster under heel      Prone on elbows  2x3 min; with table elevated; HEP     EIS  2x10     LAQ   2x10 R 5# 2x10 5#   QS+ SLR   5"x10 R 5"x10 R   Leg press   NV 2x10 R 55#, ecc lower   TKE   Seated, supine hep    Ther Activity       Tandem walk       Side stepping       Step ups   NV    Gait Training                     Modalities

## 2023-02-02 ENCOUNTER — OFFICE VISIT (OUTPATIENT)
Dept: PHYSICAL THERAPY | Facility: REHABILITATION | Age: 83
End: 2023-02-02

## 2023-02-02 DIAGNOSIS — M25.561 ACUTE PAIN OF RIGHT KNEE: Primary | ICD-10-CM

## 2023-02-02 NOTE — PROGRESS NOTES
Daily Note     Today's date: 2023  Patient name: Janie Stack  : 1940  MRN: [id number]  Referring provider: Jessica Peterson*  Dx:   Encounter Diagnosis     ICD-10-CM    1  Acute pain of right knee  M25 561           Start Time: 1102  Stop Time: 1145  Total time in clinic (min): 43 minutes    Subjective: Patient notes no changes since last session, although feeling better overall  Objective: See treatment diary below        Assessment: Pt tolerated tx well  Pt continues to demonstrate improved ROM and less c/o stiffness following manual therapy  Pt more fatigued post-tx, unable to perform leg press at previous weight  Pt notes pain relief post-tx  Pt would continue to benefit from formal PT  1:1 with Andry Escobar DPT for entirety of tx  Plan: Continue per plan of care            Precautions: HTN, Hard of hearing      Manuals  2   Manual HS stretch CM 5'  CM 5' CM 5' CM 5'   Prox tib/fib mobs CM 10', +P-A knee mobs Gr 3-4  CM 10', +P-A knee mobs Gr 3-4 CM 10', +P-A knee mobs Gr 3-4 CM 10', +P-A knee mobs Gr 3-4   Objective testing  TB              Neuro Re-Ed        SLS        DL   NV     Ecc step downs        Sciatic nerve glides        Seated HS pulls        TR at wall w/ TKE 2x10  NV 2x10 2x10   Education  HEP updates      Ther Ex        NS    10' L5 10' L5   LS gastroc stretch, strap        Supine HS stretch, strap        Seated HS stretch     20"x3 R   Stand HR/TR        Stand gastroc stretch        Seated soleus HR        SHC   2x10 5# 2x10 5# 2x10 5#   Tband sang, DF        Bridge         L/s ext        Prone hip ext        Stand hip ext                STSs 2x6 chair +foam, R sonya back  2x8 chair +foam, R sonya back     Stand marches 2x10 2#       Seated PF stretch        Pt EDU        LLLD knee ext 1'x2, x10 TKE with bolster under heel    1'x2 supine, heel bolster   Prone on elbows  2x3 min; with table elevated; HEP      EIS  2x10      LAQ   2x10 R 5# 2x10 5# 2x10 5" +gtb   QS+ SLR   5"x10 R 5"x10 R 5"x10 R   Leg press   NV 2x10 R 55#, ecc lower x10 R 55#  x8 R 40#   TKE   Seated, supine hep     Ther Activity        Tandem walk        Side stepping        Step ups   NV     Gait Training                        Modalities

## 2023-02-07 ENCOUNTER — PROCEDURE VISIT (OUTPATIENT)
Dept: OBGYN CLINIC | Facility: CLINIC | Age: 83
End: 2023-02-07

## 2023-02-07 VITALS
HEART RATE: 85 BPM | DIASTOLIC BLOOD PRESSURE: 64 MMHG | HEIGHT: 66 IN | SYSTOLIC BLOOD PRESSURE: 110 MMHG | WEIGHT: 164.8 LBS | OXYGEN SATURATION: 98 % | BODY MASS INDEX: 26.48 KG/M2

## 2023-02-07 DIAGNOSIS — M17.11 PRIMARY OSTEOARTHRITIS OF ONE KNEE, RIGHT: Primary | ICD-10-CM

## 2023-02-07 RX ORDER — ROPIVACAINE HYDROCHLORIDE 5 MG/ML
10 INJECTION, SOLUTION EPIDURAL; INFILTRATION; PERINEURAL
Status: COMPLETED | OUTPATIENT
Start: 2023-02-07 | End: 2023-02-07

## 2023-02-07 RX ORDER — BUPIVACAINE HYDROCHLORIDE 5 MG/ML
3.5 INJECTION, SOLUTION PERINEURAL
Status: COMPLETED | OUTPATIENT
Start: 2023-02-07 | End: 2023-02-07

## 2023-02-07 RX ADMIN — ROPIVACAINE HYDROCHLORIDE 10 ML: 5 INJECTION, SOLUTION EPIDURAL; INFILTRATION; PERINEURAL at 11:30

## 2023-02-07 RX ADMIN — BUPIVACAINE HYDROCHLORIDE 3.5 ML: 5 INJECTION, SOLUTION PERINEURAL at 11:30

## 2023-02-07 NOTE — PROGRESS NOTES
[facility]  [address]  [facility] [address] [phone]      Chief Complaint:  Chief Complaint   Patient presents with   • Right Knee - Follow-up       Vitals:  /64   Pulse 85   Ht 5' 6" (1 676 m)   Wt 74 8 kg (164 lb 12 8 oz)   SpO2 98%   BMI 26 60 kg/m²     The following portions of the patient's history were reviewed and updated as appropriate: allergies, current medications, past family history, past medical history, past social history, past surgical history, and problem list       Subjective:   Patient ID: Farhan Lozada is a 81 y o  female  Here for R knee visco injection      Review of Systems   Constitutional: Negative for fatigue and fever  Respiratory: Negative for shortness of breath  Cardiovascular: Negative for chest pain  Gastrointestinal: Negative for abdominal pain and nausea  Genitourinary: Negative for dysuria  Musculoskeletal: Positive for arthralgias  Skin: Negative for rash and wound  Neurological: Negative for weakness and headaches  Objective:  Ortho Exam      Physical Exam  Constitutional:       Appearance: Normal appearance  She is normal weight  HENT:      Head: Normocephalic  Eyes:      Extraocular Movements: Extraocular movements intact  Pulmonary:      Effort: Pulmonary effort is normal    Musculoskeletal:      Cervical back: Normal range of motion  Skin:     General: Skin is warm and dry  Neurological:      General: No focal deficit present  Mental Status: She is alert and oriented to person, place, and time  Mental status is at baseline  Psychiatric:         Mood and Affect: Mood normal          Behavior: Behavior normal          Thought Content: Thought content normal          Judgment: Judgment normal          Large joint arthrocentesis: R knee  Universal Protocol:  Consent: Verbal consent obtained    Risks and benefits: risks, benefits and alternatives were discussed  Consent given by: patient  Time out: Immediately prior to procedure a "time out" was called to verify the correct patient, procedure, equipment, support staff and site/side marked as required  Timeout called at: 2/7/2023 11:29 AM   Site marked: the operative site was marked  Supporting Documentation  Indications: pain   Procedure Details  Location: knee - R knee  Preparation: Patient was prepped and draped in the usual sterile fashion  Needle gauge: 21   Ultrasound guidance: no  Approach: anterolateral  Medications administered: 10 mL ropivacaine 0 5 %; 3 5 mL bupivacaine 0 5 %; 30 mg sodium hyaluronate 30 mg/2 mL    Patient tolerance: patient tolerated the procedure well with no immediate complications  Dressing:  Sterile dressing applied            Assessment/Plan:  Assessment/Plan   Diagnoses and all orders for this visit:    Primary osteoarthritis of one knee, right    Other orders  -     Large joint arthrocentesis        Return in about 1 week (around 2/14/2023) for Recheck       Kristina Mast MD

## 2023-02-10 ENCOUNTER — OFFICE VISIT (OUTPATIENT)
Dept: PHYSICAL THERAPY | Facility: REHABILITATION | Age: 83
End: 2023-02-10

## 2023-02-10 DIAGNOSIS — M25.561 ACUTE PAIN OF RIGHT KNEE: Primary | ICD-10-CM

## 2023-02-10 NOTE — PROGRESS NOTES
Daily Note     Today's date: 2/10/2023  Patient name: Deon Storey  : 1940  MRN: [id number]  Referring provider: Philip Barrett*  Dx:   Encounter Diagnosis     ICD-10-CM    1  Acute pain of right knee  M25 561           Start Time: 936  Stop Time:   Total time in clinic (min): 56 minutes    Subjective: Patient reports she recently had injection this week from scheduled series of injections  Pt reports it went well overall, had slight discomfort following  Objective: See treatment diary below        Assessment: Pt tolerated tx well  Pt continues to report relief following manual therapy  Pt able to progress eccentric quad strengthening today with leg press, notes fatigue post-tx and no pain  Continue to progress as tolerated  Pt would continue to benefit from formal PT  1:1 with Murali Arroyo DPT for entirety of tx  Plan: Continue per plan of care            Precautions: HTN, Hard of hearing      Manuals 1/19 1/24 1/26 1/31 2/2 2/10   Manual HS stretch CM 5'  CM 5' CM 5' CM 5' CM 5' +quad   Prox tib/fib mobs CM 10', +P-A knee mobs Gr 3-4  CM 10', +P-A knee mobs Gr 3-4 CM 10', +P-A knee mobs Gr 3-4 CM 10', +P-A knee mobs Gr 3-4 CM 10', +P-A knee mobs Gr 3-4   Objective testing  TB                Neuro Re-Ed         SLS         DL   NV      Ecc step downs         Sciatic nerve glides         Seated HS pulls         TR at wall w/ TKE 2x10  NV 2x10 2x10 2x10   Education  HEP updates       Ther Ex         NS    10' L5 10' L5 10' L5   LS gastroc stretch, strap         Supine HS stretch, strap         Seated HS stretch     20"x3 R    Stand HR/TR         Stand gastroc stretch         Seated soleus HR         SHC   2x10 5# 2x10 5# 2x10 5#    Tband sang, DF         Bridge          L/s ext         Prone hip ext         Stand hip ext                  STSs 2x6 chair +foam, R sonya back  2x8 chair +foam, R sonya back      Stand marches 2x10 2#        Seated PF stretch         Pt EDU LLLD knee ext 1'x2, x10 TKE with bolster under heel    1'x2 supine, heel bolster 1'x2 supine, heel bolster   Prone on elbows  2x3 min; with table elevated; HEP       EIS  2x10       LAQ   2x10 R 5# 2x10 5# 2x10 5" +gtb    QS+ SLR   5"x10 R 5"x10 R 5"x10 R 2x10 R   Leg press   NV 2x10 R 55#, ecc lower x10 R 55#  x8 R 40# 2x8 R 55# ecc lower   TKE   Seated, supine hep      Ther Activity         Tandem walk         Side stepping         Step ups   NV   2x10 R 9", ecc lower, FSU   Gait Training                           Modalities

## 2023-02-14 ENCOUNTER — PROCEDURE VISIT (OUTPATIENT)
Dept: OBGYN CLINIC | Facility: CLINIC | Age: 83
End: 2023-02-14

## 2023-02-14 VITALS
SYSTOLIC BLOOD PRESSURE: 128 MMHG | BODY MASS INDEX: 26.36 KG/M2 | DIASTOLIC BLOOD PRESSURE: 84 MMHG | HEIGHT: 66 IN | WEIGHT: 164 LBS | HEART RATE: 85 BPM

## 2023-02-14 DIAGNOSIS — M17.11 PRIMARY OSTEOARTHRITIS OF ONE KNEE, RIGHT: Primary | ICD-10-CM

## 2023-02-14 RX ORDER — ROPIVACAINE HYDROCHLORIDE 5 MG/ML
10 INJECTION, SOLUTION EPIDURAL; INFILTRATION; PERINEURAL
Status: COMPLETED | OUTPATIENT
Start: 2023-02-14 | End: 2023-02-14

## 2023-02-14 RX ORDER — BUPIVACAINE HYDROCHLORIDE 5 MG/ML
3.5 INJECTION, SOLUTION PERINEURAL
Status: COMPLETED | OUTPATIENT
Start: 2023-02-14 | End: 2023-02-14

## 2023-02-14 RX ADMIN — ROPIVACAINE HYDROCHLORIDE 10 ML: 5 INJECTION, SOLUTION EPIDURAL; INFILTRATION; PERINEURAL at 11:38

## 2023-02-14 RX ADMIN — BUPIVACAINE HYDROCHLORIDE 3.5 ML: 5 INJECTION, SOLUTION PERINEURAL at 11:38

## 2023-02-14 NOTE — PROGRESS NOTES
[facility]  [address]  [facility] [id number] [phone]      F/u 1 wk  R knee visco #2 given    Chief Complaint:  Chief Complaint   Patient presents with   • Left Knee - Follow-up   • Right Knee - Follow-up       Vitals:  /84   Pulse 85   Ht 5' 6" (1 676 m)   Wt 74 4 kg (164 lb)   BMI 26 47 kg/m²     The following portions of the patient's history were reviewed and updated as appropriate: allergies, current medications, past family history, past medical history, past social history, past surgical history, and problem list       Subjective:   Patient ID: Pj Hedrick is a 81 y o  female  R knee visco #2      Review of Systems   Constitutional: Negative for fatigue and fever  Respiratory: Negative for shortness of breath  Cardiovascular: Negative for chest pain  Gastrointestinal: Negative for abdominal pain and nausea  Genitourinary: Negative for dysuria  Musculoskeletal: Positive for arthralgias  Skin: Negative for rash and wound  Neurological: Negative for weakness and headaches  Objective:  Ortho Exam    Physical Exam      Large joint arthrocentesis: R knee  Universal Protocol:  Consent: Verbal consent obtained  Risks and benefits: risks, benefits and alternatives were discussed  Consent given by: patient  Time out: Immediately prior to procedure a "time out" was called to verify the correct patient, procedure, equipment, support staff and site/side marked as required    Timeout called at: 2/14/2023 11:36 AM   Site marked: the operative site was marked  Supporting Documentation  Indications: pain   Procedure Details  Location: knee - R knee  Preparation: Patient was prepped and draped in the usual sterile fashion  Needle gauge: 21   Ultrasound guidance: no  Approach: anterolateral  Medications administered: 10 mL ropivacaine 0 5 %; 3 5 mL bupivacaine 0 5 %; 30 mg sodium hyaluronate 30 mg/2 mL    Patient tolerance: patient tolerated the procedure well with no immediate complications  Dressing:  Sterile dressing applied            Assessment/Plan:  Assessment/Plan   Diagnoses and all orders for this visit:    Primary osteoarthritis of one knee, right    Other orders  -     Large joint arthrocentesis        Return in about 1 week (around 2/21/2023) for Recheck       Dang Rubi MD

## 2023-02-17 ENCOUNTER — OFFICE VISIT (OUTPATIENT)
Dept: PHYSICAL THERAPY | Facility: REHABILITATION | Age: 83
End: 2023-02-17

## 2023-02-17 DIAGNOSIS — M25.561 ACUTE PAIN OF RIGHT KNEE: Primary | ICD-10-CM

## 2023-02-17 NOTE — PROGRESS NOTES
Daily Note     Today's date: 2023  Patient name: Francine Johnson  : 1940  MRN: [id number]  Referring provider: Fatmata Dennis*  Dx:   Encounter Diagnosis     ICD-10-CM    1  Acute pain of right knee  M25 561           Start Time: 930  Stop Time:   Total time in clinic (min): 45 minutes    Subjective: Patient continues to report overall improvement in stiffness and pain  Objective: See treatment diary below        Assessment: Pt tolerated tx well  Pt c/o minor "cramping" during session, relieved with standing hip add stretch  Pt continues to progress quad strength well, emphasis into TKE  Pt with improved end-range knee ext following manual therapy  Continue to progress as tolerated  Pt would continue to benefit from formal PT  1:1 with Justin Neff DPT for entirety of tx  Plan: Continue per plan of care            Precautions: HTN, Hard of hearing      Manuals 1/19 1/24 1/26 1/31 2/2 2/10 2/17   Manual HS stretch CM 5'  CM 5' CM 5' CM 5' CM 5' +quad CM 5'   Prox tib/fib mobs CM 10', +P-A knee mobs Gr 3-4  CM 10', +P-A knee mobs Gr 3-4 CM 10', +P-A knee mobs Gr 3-4 CM 10', +P-A knee mobs Gr 3-4 CM 10', +P-A knee mobs Gr 3-4 CM 10', +P-A knee mobs Gr 3-4   Objective testing  TB                  Neuro Re-Ed          SLS          DL   NV       Ecc step downs          Sciatic nerve glides          Seated HS pulls          TR at wall w/ TKE 2x10  NV 2x10 2x10 2x10 2x10 gtb   Education  HEP updates        Ther Ex          NS    10' L5 10' L5 10' L5 10' L5   LS gastroc stretch, strap          Supine HS stretch, strap          Seated HS stretch     20"x3 R     Stand hip add stretch       30"x2 R   Stand HR/TR          Stand gastroc stretch          Seated soleus HR          SHC   2x10 5# 2x10 5# 2x10 5#     Tband sang, DF          Bridge           L/s ext          Prone hip ext          Stand hip ext                    STSs 2x6 chair +foam, R sonya back  2x8 chair +foam, R sonya back       Stand marches 2x10 2#         Seated PF stretch          Pt EDU          LLLD knee ext 1'x2, x10 TKE with bolster under heel    1'x2 supine, heel bolster 1'x2 supine, heel bolster 1'x2 supine, heel bolster, +PT OP   Prone on elbows  2x3 min; with table elevated; HEP        EIS  2x10        LAQ   2x10 R 5# 2x10 5# 2x10 5" +gtb     QS+ SLR   5"x10 R 5"x10 R 5"x10 R 2x10 R 2x10 R 1#   Leg press   NV 2x10 R 55#, ecc lower x10 R 55#  x8 R 40# 2x8 R 55# ecc lower 3x8 R 55# ecc lower   TKE   Seated, supine hep       Ther Activity          Tandem walk          Side stepping          Step ups   NV   2x10 R 9", ecc lower, FSU 2x10 R 9", ecc lower, FSU   Gait Training                              Modalities

## 2023-02-20 ENCOUNTER — RA CDI HCC (OUTPATIENT)
Dept: OTHER | Facility: HOSPITAL | Age: 83
End: 2023-02-20

## 2023-02-20 NOTE — PROGRESS NOTES
[facility]  coding opportunities          Chart Reviewed number of suggestions sent to Provider: 2  E11 [id number]  E11 [id number]     Patients Insurance     Medicare Insurance: [employer]

## 2023-02-21 ENCOUNTER — PROCEDURE VISIT (OUTPATIENT)
Dept: OBGYN CLINIC | Facility: CLINIC | Age: 83
End: 2023-02-21

## 2023-02-21 VITALS
BODY MASS INDEX: 26.36 KG/M2 | HEART RATE: 82 BPM | WEIGHT: 164 LBS | DIASTOLIC BLOOD PRESSURE: 78 MMHG | SYSTOLIC BLOOD PRESSURE: 132 MMHG | OXYGEN SATURATION: 98 % | HEIGHT: 66 IN

## 2023-02-21 DIAGNOSIS — M17.11 PRIMARY OSTEOARTHRITIS OF ONE KNEE, RIGHT: Primary | ICD-10-CM

## 2023-02-21 RX ORDER — ROPIVACAINE HYDROCHLORIDE 5 MG/ML
10 INJECTION, SOLUTION EPIDURAL; INFILTRATION; PERINEURAL
Status: COMPLETED | OUTPATIENT
Start: 2023-02-21 | End: 2023-02-21

## 2023-02-21 RX ADMIN — ROPIVACAINE HYDROCHLORIDE 10 ML: 5 INJECTION, SOLUTION EPIDURAL; INFILTRATION; PERINEURAL at 11:31

## 2023-02-21 NOTE — PROGRESS NOTES
[facility] [address]  [address]  [address] [address] [phone]      Chief Complaint:  Chief Complaint   Patient presents with   • Right Knee - Pain       Vitals:  /78   Pulse 82   Ht 5' 6" (1 676 m)   Wt 74 4 kg (164 lb)   SpO2 98%   BMI 26 47 kg/m²     The following portions of the patient's history were reviewed and updated as appropriate: allergies, current medications, past family history, past medical history, past social history, past surgical history, and problem list       Subjective:   Patient ID: Dixie Wilson is a 81 y o  female  Here for f/u R knee visco #3      Review of Systems   Constitutional: Negative for fatigue and fever  Respiratory: Negative for shortness of breath  Cardiovascular: Negative for chest pain  Gastrointestinal: Negative for abdominal pain and nausea  Genitourinary: Negative for dysuria  Musculoskeletal: Positive for arthralgias  Skin: Negative for rash and wound  Neurological: Negative for weakness and headaches  Objective:  Ortho Exam      Physical Exam  Constitutional:       Appearance: Normal appearance  She is normal weight  Eyes:      Extraocular Movements: Extraocular movements intact  Pulmonary:      Effort: Pulmonary effort is normal    Musculoskeletal:      Cervical back: Normal range of motion  Skin:     General: Skin is warm and dry  Neurological:      General: No focal deficit present  Mental Status: She is alert and oriented to person, place, and time  Mental status is at baseline  Psychiatric:         Mood and Affect: Mood normal          Behavior: Behavior normal          Thought Content: Thought content normal          Judgment: Judgment normal          Large joint arthrocentesis: R knee  Universal Protocol:  Consent: Verbal consent obtained    Risks and benefits: risks, benefits and alternatives were discussed  Consent given by: patient  Time out: Immediately prior to procedure a "time out" was called to verify the correct patient, procedure, equipment, support staff and site/side marked as required  Timeout called at: 2/21/2023 11:29 AM   Site marked: the operative site was marked  Supporting Documentation  Indications: pain   Procedure Details  Location: knee - R knee  Preparation: Patient was prepped and draped in the usual sterile fashion  Needle gauge: 21   Ultrasound guidance: no  Approach: anterolateral  Medications administered: 10 mL ropivacaine 0 5 %; 30 mg sodium hyaluronate 30 mg/2 mL    Patient tolerance: patient tolerated the procedure well with no immediate complications  Dressing:  Sterile dressing applied            Assessment/Plan:  Assessment/Plan   There are no diagnoses linked to this encounter  Return if symptoms worsen or fail to improve       Fabi Porras MD

## 2023-02-24 ENCOUNTER — APPOINTMENT (OUTPATIENT)
Dept: PHYSICAL THERAPY | Facility: REHABILITATION | Age: 83
End: 2023-02-24

## 2023-02-27 ENCOUNTER — OFFICE VISIT (OUTPATIENT)
Dept: FAMILY MEDICINE CLINIC | Facility: CLINIC | Age: 83
End: 2023-02-27

## 2023-02-27 VITALS
HEART RATE: 93 BPM | WEIGHT: 165.4 LBS | OXYGEN SATURATION: 97 % | SYSTOLIC BLOOD PRESSURE: 138 MMHG | HEIGHT: 66 IN | TEMPERATURE: 97.4 F | BODY MASS INDEX: 26.58 KG/M2 | DIASTOLIC BLOOD PRESSURE: 78 MMHG

## 2023-02-27 DIAGNOSIS — N18.31 STAGE 3A CHRONIC KIDNEY DISEASE (HCC): ICD-10-CM

## 2023-02-27 DIAGNOSIS — M25.561 ACUTE PAIN OF RIGHT KNEE: ICD-10-CM

## 2023-02-27 DIAGNOSIS — I10 ESSENTIAL HYPERTENSION: Primary | ICD-10-CM

## 2023-02-27 DIAGNOSIS — I10 ESSENTIAL HYPERTENSION: ICD-10-CM

## 2023-02-27 DIAGNOSIS — E78.00 PURE HYPERCHOLESTEROLEMIA: ICD-10-CM

## 2023-02-27 PROBLEM — M65.311 TRIGGER THUMB OF RIGHT HAND: Status: RESOLVED | Noted: 2022-11-25 | Resolved: 2023-02-27

## 2023-02-27 PROBLEM — D72.819 LEUKOPENIA: Status: RESOLVED | Noted: 2020-01-08 | Resolved: 2023-02-27

## 2023-02-27 PROBLEM — Z12.31 ENCOUNTER FOR SCREENING MAMMOGRAM FOR BREAST CANCER: Status: RESOLVED | Noted: 2019-04-26 | Resolved: 2023-02-27

## 2023-02-27 PROBLEM — Z12.31 ENCOUNTER FOR SCREENING MAMMOGRAM FOR MALIGNANT NEOPLASM OF BREAST: Status: RESOLVED | Noted: 2021-07-16 | Resolved: 2023-02-27

## 2023-02-27 PROBLEM — M81.0 OSTEOPOROSIS: Status: RESOLVED | Noted: 2018-07-24 | Resolved: 2023-02-27

## 2023-02-27 PROBLEM — E66.3 OVER WEIGHT: Status: RESOLVED | Noted: 2019-04-26 | Resolved: 2023-02-27

## 2023-02-27 PROBLEM — K21.00 GERD WITH ESOPHAGITIS: Status: RESOLVED | Noted: 2018-07-24 | Resolved: 2023-02-27

## 2023-02-27 PROBLEM — I73.9 PERIPHERAL VASCULAR DISEASE OF LOWER EXTREMITY (HCC): Status: RESOLVED | Noted: 2018-07-24 | Resolved: 2023-02-27

## 2023-02-27 PROBLEM — K57.90 DIVERTICULOSIS: Status: RESOLVED | Noted: 2018-07-24 | Resolved: 2023-02-27

## 2023-02-27 PROBLEM — K76.89 LIVER CYST: Status: RESOLVED | Noted: 2018-07-24 | Resolved: 2023-02-27

## 2023-02-27 PROBLEM — R82.89 ABNORMAL URINE CYTOLOGY: Status: RESOLVED | Noted: 2020-01-08 | Resolved: 2023-02-27

## 2023-02-27 PROBLEM — I73.9 PERIPHERAL VASCULAR DISEASE (HCC): Status: RESOLVED | Noted: 2019-04-25 | Resolved: 2023-02-27

## 2023-02-27 PROBLEM — Q61.02 MULTIPLE RENAL CYSTS: Status: RESOLVED | Noted: 2018-07-24 | Resolved: 2023-02-27

## 2023-02-27 PROBLEM — R79.89 LOW VITAMIN D LEVEL: Status: RESOLVED | Noted: 2020-01-08 | Resolved: 2023-02-27

## 2023-02-27 PROBLEM — E83.41 HIGH MAGNESIUM LEVELS: Status: RESOLVED | Noted: 2021-07-15 | Resolved: 2023-02-27

## 2023-02-27 PROBLEM — Z80.3 FAMILY HISTORY OF BREAST CANCER IN MOTHER: Status: ACTIVE | Noted: 2023-02-27

## 2023-02-27 PROBLEM — N28.1 CYST OF RIGHT KIDNEY: Status: RESOLVED | Noted: 2019-08-03 | Resolved: 2023-02-27

## 2023-02-27 PROBLEM — E11.9 DIABETES (HCC): Status: RESOLVED | Noted: 2018-07-24 | Resolved: 2023-02-27

## 2023-02-27 PROBLEM — R31.21 ASYMPTOMATIC MICROSCOPIC HEMATURIA: Status: RESOLVED | Noted: 2020-01-08 | Resolved: 2023-02-27

## 2023-02-27 PROBLEM — Z23 NEED FOR IMMUNIZATION AGAINST INFLUENZA: Status: RESOLVED | Noted: 2019-10-02 | Resolved: 2023-02-27

## 2023-02-27 PROBLEM — N18.9 CHRONIC RENAL INSUFFICIENCY: Status: RESOLVED | Noted: 2018-07-24 | Resolved: 2023-02-27

## 2023-02-27 RX ORDER — EZETIMIBE 10 MG/1
10 TABLET ORAL DAILY
Qty: 90 TABLET | Refills: 3 | Status: SHIPPED | OUTPATIENT
Start: 2023-02-27

## 2023-02-27 RX ORDER — DILTIAZEM HYDROCHLORIDE EXTENDED-RELEASE TABLETS 240 MG/1
240 TABLET, EXTENDED RELEASE ORAL DAILY
Qty: 90 TABLET | Refills: 3 | Status: SHIPPED | OUTPATIENT
Start: 2023-02-27

## 2023-02-27 RX ORDER — ROSUVASTATIN CALCIUM 10 MG/1
10 TABLET, COATED ORAL DAILY
Qty: 90 TABLET | Refills: 3 | Status: SHIPPED | OUTPATIENT
Start: 2023-02-27

## 2023-02-27 RX ORDER — OLMESARTAN MEDOXOMIL 20 MG/1
20 TABLET ORAL DAILY
Qty: 90 TABLET | Refills: 3 | Status: SHIPPED | OUTPATIENT
Start: 2023-02-27

## 2023-02-27 NOTE — PROGRESS NOTES
Chief Complaint   Patient presents with   • Follow-up     3 month         HPI   52-year-old female presents for checkup  Past medical history significant for hypertension and hyperlipidemia  Also stage III chronic kidney disease  Peripheral vascular disease documented with some diffuse disease noted on arterial Doppler but no significant stenosis  No symptoms of claudication  She did quit smoking about 20 years ago  Taking her usual medications  Was having pain in the right knee  Has received 3 shots of Visco in February  Preceded by cortisone injection  Also getting physical therapy to her right knee  Has significant varicose veins also  And a history of tarsal tunnel in her right foot  Past Medical History:   Diagnosis Date   • Arthritis     Chronic problem   • Diverticulosis 2018   • GERD with esophagitis 2018   • Glaucoma 2018   • Hearing loss 2018    Wears bilateral hearing aids   • Hyperlipidemia 2018   • Hypertension    • Stage 3a chronic kidney disease ([address] ) 2023        Past Surgical History:   Procedure Laterality Date   • ABDOMINAL AORTIC ANEURYSM REPAIR Right 2007    Femoral artery-Dr Alwin Mortimer   • TUBAL LIGATION Bilateral        Social History     Tobacco Use   • Smoking status: Former     Packs/day: 0 25     Years: 15 00     Pack years: 3 75     Types: Cigarettes     Quit date: 10/20/2003     Years since quittin 3   • Smokeless tobacco: Never   • Tobacco comments:     I did quit   Substance Use Topics   • Alcohol use: Yes     Alcohol/week: 0 0 standard drinks     Comment: I have a beer or mixed drink when in a restaurant       Social History     Social History Narrative     since   Lives alone  Has a boyfriend for 20 years  He has his own place  Erika Elizalde  1 child  2 grandchildren  One great grandchild  In [place]  Enjoys reading, going out for dinner  Had worked at [employer]          The following portions of the patient's history were reviewed and updated as appropriate: allergies, current medications, past family history, past medical history, past social history, past surgical history and problem list       Review of Systems       /78 (BP Location: Left arm, Patient Position: Sitting, Cuff Size: Standard)   Pulse 93   Temp (!) 97 4 °F (36 3 °C) (Temporal)   Ht 5' 6" (1 676 m)   Wt 75 kg (165 lb 6 4 oz)   SpO2 97%   BMI 26 70 kg/m²      Physical Exam   Pleasant female who appears well  Lungs are clear  Heart regular with no murmur  Abdomen soft and nontender  Mood is okay  Affect appropriate  No effusion noted at the right knee  Significant varicose veins which are nontender on the right leg  Current Outpatient Medications:   •  Acetaminophen (TYLENOL ARTHRITIS EXT RELIEF PO), Take by mouth, Disp: , Rfl:   •  ASPERCREME LIDOCAINE EX, Apply topically, Disp: , Rfl:   •  bimatoprost (LUMIGAN) 0 01 % ophthalmic drops, Apply 1 drop to eye Daily, Disp: , Rfl:   •  brimonidine (ALPHAGAN P) 0 1 %, Apply 1 drop to eye every 12 (twelve) hours, Disp: , Rfl:   •  Calcium Carbonate-Vitamin D (CALTRATE 600+D PO), Take 2 tablets by mouth daily , Disp: , Rfl:   •  cholecalciferol (VITAMIN D3) 1,000 units tablet, Take 1,000 Units by mouth daily, Disp: , Rfl:   •  diltiazem (CARDIZEM LA) 240 MG 24 hr tablet, Take 1 tablet (240 mg total) by mouth daily, Disp: 90 tablet, Rfl: 0  •  ezetimibe (ZETIA) 10 mg tablet, Take 1 tablet (10 mg total) by mouth daily, Disp: 90 tablet, Rfl: 0  •  Multiple Vitamins-Minerals (CENTRUM SILVER ULTRA WOMENS PO), Take 1 tablet by mouth daily, Disp: , Rfl:   •  olmesartan (Benicar) 20 mg tablet, Take 1 tablet (20 mg total) by mouth daily, Disp: 90 tablet, Rfl: 0  •  rosuvastatin (CRESTOR) 10 MG tablet, Take 1 tablet (10 mg total) by mouth daily, Disp: 90 tablet, Rfl: 0     No problem-specific Assessment & Plan notes found for this encounter          Diagnoses and all orders for this visit:    Essential hypertension    Pure hypercholesterolemia    Stage 3a chronic kidney disease (HCC)    Acute pain of right knee    Essential hypertension  Comments:  Controlled   Continue medication  To follow with the dash diet    Pure hypercholesterolemia  Comments: To follow with low-fat diet        Patient Instructions   Looks great  Blood pressure is well controlled  Lipid profile was excellent  Continue same medications  Okay to stop baby aspirin because no history of stroke or heart attack  Review of her A1c which was in the prediabetic range  No medications needed  Recheck in 4 months

## 2023-02-27 NOTE — PATIENT INSTRUCTIONS
Looks great  Blood pressure is well controlled  Lipid profile was excellent  Continue same medications  Okay to stop baby aspirin because no history of stroke or heart attack  Review of her A1c which was in the prediabetic range  No medications needed  Recheck in 4 months

## 2023-02-28 ENCOUNTER — OFFICE VISIT (OUTPATIENT)
Dept: PHYSICAL THERAPY | Facility: REHABILITATION | Age: 83
End: 2023-02-28

## 2023-02-28 DIAGNOSIS — M25.561 ACUTE PAIN OF RIGHT KNEE: Primary | ICD-10-CM

## 2023-02-28 NOTE — PROGRESS NOTES
Daily Note     Today's date: 2023  Patient name: Bigg Sanchez  : 1940  MRN: [id number]  Referring provider: Riaz Taylor*  Dx:   Encounter Diagnosis     ICD-10-CM    1  Acute pain of right knee  M25 561                      Subjective: Pt offers no new complaints upon arrival      Objective: See treatment diary below      Assessment: Tolerated treatment well  Patient would benefit from continued PT   Pt  able to complete all exercises with no increase in pain during or after session  Pt able to progress some exercises this session without complaint  Pt  1:1 with PTA for entirety  Plan: Continue per plan of care            Precautions: HTN, Hard of hearing      Manuals 1/31 2/2 2/10 2/17 2/28   Manual HS stretch CM 5' CM 5' CM 5' +quad CM 5' KP 4'   Prox tib/fib mobs CM 10', +P-A knee mobs Gr 3-4 CM 10', +P-A knee mobs Gr 3-4 CM 10', +P-A knee mobs Gr 3-4 CM 10', +P-A knee mobs Gr 3-4    Objective testing        Knee PROM     KP 4'   Neuro Re-Ed        SLS        DL        Ecc step downs        Sciatic nerve glides        Seated HS pulls        TR at wall w/ TKE 2x10 2x10 2x10 2x10 gtb 20x   Education        Ther Ex        NS 10' L5 10' L5 10' L5 10' L5 10' L5   LS gastroc stretch, strap        Supine HS stretch, strap        Seated HS stretch  20"x3 R      Stand hip add stretch    30"x2 R    Stand HR/TR        Stand gastroc stretch        Seated soleus HR        SHC 2x10 5# 2x10 5#      Tband sang, DF        Bridge         L/s ext        Prone hip ext        Stand hip ext                STSs        Stand marches        Seated PF stretch        Pt EDU        LLLD knee ext  1'x2 supine, heel bolster 1'x2 supine, heel bolster 1'x2 supine, heel bolster, +PT OP 2x1' supine + bolster + OP   Prone on elbows        EIS        LAQ 2x10 5# 2x10 5" +gtb      QS+ SLR 5"x10 R 5"x10 R 2x10 R 2x10 R 1# 2x10 R 2#   Leg press 2x10 R 55#, ecc lower x10 R 55#  x8 R 40# 2x8 R 55# ecc lower 3x8 R 55# ecc lower 3x10 R 55# ecc    TKE        Ther Activity        Tandem walk        Side stepping        Step ups   2x10 R 9", ecc lower, FSU 2x10 R 9", ecc lower, FSU 2x10 9" ecc lower FSU   Gait Training                        Modalities

## 2023-03-02 ENCOUNTER — OFFICE VISIT (OUTPATIENT)
Dept: PHYSICAL THERAPY | Facility: REHABILITATION | Age: 83
End: 2023-03-02

## 2023-03-02 DIAGNOSIS — M25.561 ACUTE PAIN OF RIGHT KNEE: Primary | ICD-10-CM

## 2023-03-02 NOTE — PROGRESS NOTES
Daily Note     Today's date: 3/2/2023  Patient name: Rica Fan  : 1940  MRN: [id number]  Referring provider: Madelyn Strange*  Dx:   Encounter Diagnosis     ICD-10-CM    1  Acute pain of right knee  M25 561                      Subjective: Pt reports she felt improvement after last session, was able to drive the rest of the day with no discomfort  Pt reports she had pain and soreness return the next day  Objective: See treatment diary below      Assessment: Tolerated treatment well  Patient would benefit from continued PT   Pt  able to complete all exercises with no increase in pain during or after session  Pt demonstrated overall improvement with reduction in cuing this session  Pt  1:1 with PTA for entirety  Plan: Continue per plan of care            Precautions: HTN, Hard of hearing      Manuals 1/31 2/2 2/10 2/17 2/28 3/2   Manual HS stretch CM 5' CM 5' CM 5' +quad CM 5' KP 4' KP 4'   Prox tib/fib mobs CM 10', +P-A knee mobs Gr 3-4 CM 10', +P-A knee mobs Gr 3-4 CM 10', +P-A knee mobs Gr 3-4 CM 10', +P-A knee mobs Gr 3-4     Objective testing         Knee PROM     KP 4' KP 4'   Neuro Re-Ed         SLS         DL         Ecc step downs         Sciatic nerve glides         Seated HS pulls         TR at wall w/ TKE 2x10 2x10 2x10 2x10 gtb 20x 20x   Education         Ther Ex         NS 10' L5 10' L5 10' L5 10' L5 10' L5 10' L5   LS gastroc stretch, strap         Supine HS stretch, strap         Seated HS stretch  20"x3 R       Stand hip add stretch    30"x2 R     Stand HR/TR         Stand gastroc stretch         Seated soleus HR         SHC 2x10 5# 2x10 5#       Tband sang, DF         Bridge          L/s ext         Prone hip ext         Stand hip ext                  STSs         Stand marches         Seated PF stretch         Pt EDU         LLLD knee ext  1'x2 supine, heel bolster 1'x2 supine, heel bolster 1'x2 supine, heel bolster, +PT OP 2x1' supine + bolster + OP 2x1' supine + bolster + OP   Prone on elbows         EIS         LAQ 2x10 5# 2x10 5" +gtb       QS+ SLR 5"x10 R 5"x10 R 2x10 R 2x10 R 1# 2x10 R 2# 2x10 R 2#   Leg press 2x10 R 55#, ecc lower x10 R 55#  x8 R 40# 2x8 R 55# ecc lower 3x8 R 55# ecc lower 3x10 R 55# ecc  3x10 R 55# ecc   TKE         Ther Activity         Tandem walk         Side stepping         Step ups   2x10 R 9", ecc lower, FSU 2x10 R 9", ecc lower, FSU 2x10 9" ecc lower FSU 2x10 9" ecc lower FSU   Gait Training                           Modalities

## 2023-03-07 ENCOUNTER — EVALUATION (OUTPATIENT)
Dept: PHYSICAL THERAPY | Facility: REHABILITATION | Age: 83
End: 2023-03-07

## 2023-03-07 DIAGNOSIS — M25.561 ACUTE PAIN OF RIGHT KNEE: Primary | ICD-10-CM

## 2023-03-07 NOTE — PROGRESS NOTES
PT Re-Evaluation / Discharge    Today's date: 3/7/2023  Patient name: Izabella Garces  : 1940  MRN: [id number]  Referring provider: Rae Monet*  Dx:   Encounter Diagnosis     ICD-10-CM    1  Acute pain of right knee  M25 561           Start Time: 1024  Stop Time: 1115  Total time in clinic (min): 51 minutes    Subjective: Pt reports "the pain is nothing like it was before  Usually a hot shower and my exercises get me going " Pt reports she will occasionally take tylenol when increased pain  Pt reports improvement in stair negotiation and prolonged sitting with driving  Pain  Current pain ratin  At best pain ratin  At worst pain ratin  Quality: dull ache       Objective: See treatment diary below    Range of Motion: Goniometric revealed the following findings (in degrees)  Joint Motion Right:  Left:    Hip Extension [facility]   Hip External Rotation [facility]   Hip Internal Rotation [facility] WFL   Knee Extension 0 deg [facility]   Knee Flexion [facility] WFL   Ankle Dorsiflexion 5 5   Ankle Plantarflexion [facility] WFL       Strength: MMT revealed the following findings  Joint Motion Right:  Left:    Hip Flexion 4/5 4/5   Hip Abduction 4/5 4/5   Hip Extension 4+/5 4+/5   Knee Extension 5/5 5/5   Knee Flexion 4+/5 4+/5   Ankle Plantarflexion 5/5 5/5   Ankle Dorsiflexion 5/5 5/5   * indicates pain    Additional Assessments:  Palpation: no TTP, neg Jah's sign  Observation: RLE global swelling present at baseline per pt due to varicose veins  Gait Pattern: slight decreased stance time on RLE  Balance: WFL  Joint Mobility: WFL patella  Squat: ant WS, no pain        Assessment: Izabella Garces is a 81y o  year old female with a referred dx of Acute pain of right knee  Pt has demonstrated improvements in overall strength, knee ext ROM, and tolerance to activities such as driving  Pt would be appropriate for formal d/c from PT and transition to hep due to improved objective measures and all goals met   D/c formal PT at this time, pt in agrement  Goals  Short Term Goals: In 4 weeks, the patient will:  1  Decrease worst pain by 2 points for improved QOL  - met  2  Improve knee flexion strength by 1/2 grade for improved LE fx  - met   3  Supervision with HEP for self care  - met    Long Term Goals: In 8 weeks, the patient will:  1  Report less pain with stair negotiation to improve tolerance to functional activities  - met  2  FOTO to greater than predicted value  - met  3  Independent with comprehensive HEP upon discharge  - met  4   Ambulate without limp to meet pt's goal  - met      Plan: D/c formal PT, transition to HEP           Precautions: HTN, Hard of hearing      Manuals 1/31 2/2 2/10 2/17 2/28 3/2 3/7   Manual HS stretch CM 5' CM 5' CM 5' +quad CM 5' KP 4' KP 4'    Prox tib/fib mobs CM 10', +P-A knee mobs Gr 3-4 CM 10', +P-A knee mobs Gr 3-4 CM 10', +P-A knee mobs Gr 3-4 CM 10', +P-A knee mobs Gr 3-4      Objective testing          Knee PROM     KP 4' KP 4'    Neuro Re-Ed          SLS          DL          Ecc step downs          Sciatic nerve glides          Seated HS pulls          TR at wall w/ TKE 2x10 2x10 2x10 2x10 gtb 20x 20x 2x10 gtb TKE only   Education          Ther Ex          NS 10' L5 10' L5 10' L5 10' L5 10' L5 10' L5 10' L5   LS gastroc stretch, strap          Supine HS stretch, strap          Seated HS stretch  20"x3 R        Stand hip add stretch    30"x2 R      Stand HR/TR          Stand gastroc stretch          Seated soleus HR          SHC 2x10 5# 2x10 5#        Tband sang, DF          Bridge           L/s ext          Prone hip ext          Stand hip ext                    STSs          Stand marches          Seated PF stretch          Pt EDU          LLLD knee ext  1'x2 supine, heel bolster 1'x2 supine, heel bolster 1'x2 supine, heel bolster, +PT OP 2x1' supine + bolster + OP 2x1' supine + bolster + OP 5" hold QS 2x10, towel under foot   Prone on elbows          EIS          LAQ 2x10 5# 2x10 5" +gtb        QS+ SLR 5"x10 R 5"x10 R 2x10 R 2x10 R 1# 2x10 R 2# 2x10 R 2# 2x10 2#   Leg press 2x10 R 55#, ecc lower x10 R 55#  x8 R 40# 2x8 R 55# ecc lower 3x8 R 55# ecc lower 3x10 R 55# ecc  3x10 R 55# ecc 3x10 R 55# ecc   TKE          Ther Activity          Tandem walk          Side stepping          Step ups   2x10 R 9", ecc lower, FSU 2x10 R 9", ecc lower, FSU 2x10 9" ecc lower FSU 2x10 9" ecc lower FSU    Gait Training                              Modalities

## 2023-03-09 ENCOUNTER — APPOINTMENT (OUTPATIENT)
Dept: PHYSICAL THERAPY | Facility: REHABILITATION | Age: 83
End: 2023-03-09

## 2023-03-14 ENCOUNTER — APPOINTMENT (OUTPATIENT)
Dept: PHYSICAL THERAPY | Facility: REHABILITATION | Age: 83
End: 2023-03-14

## 2023-03-16 ENCOUNTER — APPOINTMENT (OUTPATIENT)
Dept: PHYSICAL THERAPY | Facility: REHABILITATION | Age: 83
End: 2023-03-16

## 2023-06-21 ENCOUNTER — HOSPITAL ENCOUNTER (OUTPATIENT)
Dept: MAMMOGRAPHY | Facility: CLINIC | Age: 83
Discharge: HOME | End: 2023-06-21
Payer: MEDICARE

## 2023-06-21 VITALS — HEIGHT: 66 IN | BODY MASS INDEX: 26.52 KG/M2 | WEIGHT: 165 LBS

## 2023-06-21 DIAGNOSIS — Z12.31 ENCOUNTER FOR SCREENING MAMMOGRAM FOR MALIGNANT NEOPLASM OF BREAST: ICD-10-CM

## 2023-06-21 PROCEDURE — 77067 SCR MAMMO BI INCL CAD: CPT

## 2023-06-21 PROCEDURE — 77063 BREAST TOMOSYNTHESIS BI: CPT

## 2023-06-26 ENCOUNTER — OFFICE VISIT (OUTPATIENT)
Dept: FAMILY MEDICINE CLINIC | Facility: CLINIC | Age: 83
End: 2023-06-26
Payer: MEDICARE

## 2023-06-26 VITALS
SYSTOLIC BLOOD PRESSURE: 140 MMHG | BODY MASS INDEX: 26.68 KG/M2 | WEIGHT: 166 LBS | OXYGEN SATURATION: 97 % | HEIGHT: 66 IN | TEMPERATURE: 97.3 F | DIASTOLIC BLOOD PRESSURE: 76 MMHG | HEART RATE: 88 BPM

## 2023-06-26 DIAGNOSIS — R73.03 PREDIABETES: ICD-10-CM

## 2023-06-26 DIAGNOSIS — Z00.00 MEDICARE ANNUAL WELLNESS VISIT, SUBSEQUENT: Primary | ICD-10-CM

## 2023-06-26 DIAGNOSIS — I10 ESSENTIAL HYPERTENSION: ICD-10-CM

## 2023-06-26 DIAGNOSIS — N18.31 STAGE 3A CHRONIC KIDNEY DISEASE (HCC): ICD-10-CM

## 2023-06-26 DIAGNOSIS — E78.00 PURE HYPERCHOLESTEROLEMIA: ICD-10-CM

## 2023-06-26 PROCEDURE — G0439 PPPS, SUBSEQ VISIT: HCPCS | Performed by: FAMILY MEDICINE

## 2023-06-26 PROCEDURE — 99214 OFFICE O/P EST MOD 30 MIN: CPT | Performed by: FAMILY MEDICINE

## 2023-06-26 NOTE — PROGRESS NOTES
Chief Complaint   Patient presents with   • Follow-up     4 month    • Medicare Wellness Visit     Add on        HPI   Here for follow-up of hypertension, hyperlipidemia, and chronic kidney disease  Mostly bothered by pain in her back, hips, and knees  But she keeps on truck and on  Did complete physical therapy  Now does some stretching at home  Takes 1300 mg of acetaminophen in the morning and feels better in an hour  And 650 mg at bedtime  Past Medical History:   Diagnosis Date   • Arthritis     Chronic problem   • Diverticulosis 2018   • Family history of breast cancer in mother 2023   • GERD with esophagitis 2018   • Glaucoma 2018   • Hearing loss 2018    Wears bilateral hearing aids   • Hyperlipidemia 2018   • Hypertension    • Prediabetes 2023   • Stage 3a chronic kidney disease ([facility] [address] ) 2023        Past Surgical History:   Procedure Laterality Date   • ABDOMINAL AORTIC ANEURYSM REPAIR Right 2007    Femoral artery-Dr Ar Kim   • TUBAL LIGATION Bilateral        Social History     Tobacco Use   • Smoking status: Former     Packs/day: 0 25     Years: 15 00     Total pack years: 3 75     Types: Cigarettes     Quit date: 10/20/2003     Years since quittin 6   • Smokeless tobacco: Never   • Tobacco comments:     I did quit   Substance Use Topics   • Alcohol use: Yes     Alcohol/week: 0 0 standard drinks of alcohol     Comment: I have a beer or mixed drink when in a restaurant       Social History     Social History Narrative     since   Lives alone  Has a boyfriend for 20 years  He has his own place  Daquan Arshad  1 child  2 grandchildren  One great grandchild  In [place]  Enjoys reading, going out for dinner  Had worked at [employer]          The following portions of the patient's history were reviewed and updated as appropriate: allergies, current medications, past family history, past medical history, past social history, "past surgical history and problem list       Review of Systems       /76 (BP Location: Left arm, Patient Position: Sitting, Cuff Size: Standard)   Pulse 88   Temp (!) 97 3 °F (36 3 °C) (Temporal)   Ht 5' 6\" (1 676 m)   Wt 75 3 kg (166 lb)   SpO2 97%   BMI 26 79 kg/m²      Physical Exam   Looks great  Lungs are clear  Heart regular with a 2/6 systolic murmur  Abdomen soft and nontender  No edema  Mood is upbeat  Affect appropriate  Cholesterol excellent 4 months ago  A1c 6 0  Current Outpatient Medications:   •  Acetaminophen (TYLENOL ARTHRITIS EXT RELIEF PO), Take by mouth, Disp: , Rfl:   •  ASPERCREME LIDOCAINE EX, Apply topically, Disp: , Rfl:   •  bimatoprost (LUMIGAN) 0 01 % ophthalmic drops, Apply 1 drop to eye Daily, Disp: , Rfl:   •  brimonidine (ALPHAGAN P) 0 1 %, Apply 1 drop to eye every 12 (twelve) hours, Disp: , Rfl:   •  Calcium Carbonate-Vitamin D (CALTRATE 600+D PO), Take 2 tablets by mouth daily , Disp: , Rfl:   •  cholecalciferol (VITAMIN D3) 1,000 units tablet, Take 1,000 Units by mouth daily, Disp: , Rfl:   •  diltiazem (CARDIZEM LA) 240 MG 24 hr tablet, Take 1 tablet (240 mg total) by mouth daily, Disp: 90 tablet, Rfl: 3  •  ezetimibe (ZETIA) 10 mg tablet, Take 1 tablet (10 mg total) by mouth daily, Disp: 90 tablet, Rfl: 3  •  Multiple Vitamins-Minerals (CENTRUM SILVER ULTRA WOMENS PO), Take 1 tablet by mouth daily, Disp: , Rfl:   •  olmesartan (Benicar) 20 mg tablet, Take 1 tablet (20 mg total) by mouth daily, Disp: 90 tablet, Rfl: 3  •  rosuvastatin (CRESTOR) 10 MG tablet, Take 1 tablet (10 mg total) by mouth daily, Disp: 90 tablet, Rfl: 3     No problem-specific Assessment & Plan notes found for this encounter  Diagnoses and all orders for this visit:    Medicare annual wellness visit, subsequent    Essential hypertension    Pure hypercholesterolemia    Prediabetes    Stage 3a chronic kidney disease ([facility] [address] )        Patient Instructions     Looks great    Blood " pressure well controlled  Medicare wellness exam is completed  A booster for COVID is recommended  The new one targeted toward the Omicron variant  Must be obtained at local drugstore  Recheck in 4 months  Medicare Preventive Visit Patient Instructions  Thank you for completing your Welcome to Medicare Visit or Medicare Annual Wellness Visit today  Your next wellness visit will be due in one year (6/26/2024)  The screening/preventive services that you may require over the next 5-10 years are detailed below  Some tests may not apply to you based off risk factors and/or age  Screening tests ordered at today's visit but not completed yet may show as past due  Also, please note that scanned in results may not display below  Preventive Screenings:  Service Recommendations Previous Testing/Comments   Colorectal Cancer Screening  * Colonoscopy    * Fecal Occult Blood Test (FOBT)/Fecal Immunochemical Test (FIT)  * Fecal DNA/Cologuard Test  * Flexible Sigmoidoscopy Age: 39-70 years old   Colonoscopy: every 10 years (may be performed more frequently if at higher risk)  OR  FOBT/FIT: every 1 year  OR  Cologuard: every 3 years  OR  Sigmoidoscopy: every 5 years  Screening may be recommended earlier than age 39 if at higher risk for colorectal cancer  Also, an individualized decision between you and your healthcare provider will decide whether screening between the ages of 74-80 would be appropriate  Colonoscopy: Not on file  FOBT/FIT: Not on file  Cologuard: Not on file  Sigmoidoscopy: Not on file          Breast Cancer Screening Age: 36 years old  Frequency: every 1-2 years  Not required if history of left and right mastectomy Mammogram: 06/21/2023        Cervical Cancer Screening Between the ages of 21-29, pap smear recommended once every 3 years  Between the ages of 33-67, can perform pap smear with HPV co-testing every 5 years     Recommendations may differ for women with a history of total hysterectomy, cervical cancer, or abnormal pap smears in past  Pap Smear: Not on file        Hepatitis C Screening Once for adults born between 1945 and 1965  More frequently in patients at high risk for Hepatitis C Hep C Antibody: 07/09/2020        Diabetes Screening 1-2 times per year if you're at risk for diabetes or have pre-diabetes Fasting glucose: No results in last 5 years (No results in last 5 years)  A1C: 6 0 % (2/17/2023)      Cholesterol Screening Once every 5 years if you don't have a lipid disorder  May order more often based on risk factors  Lipid panel: 02/17/2023          Other Preventive Screenings Covered by Medicare:  1  Abdominal Aortic Aneurysm (AAA) Screening: covered once if your at risk  You're considered to be at risk if you have a family history of AAA  2  Lung Cancer Screening: covers low dose CT scan once per year if you meet all of the following conditions: (1) Age 50-69; (2) No signs or symptoms of lung cancer; (3) Current smoker or have quit smoking within the last 15 years; (4) You have a tobacco smoking history of at least 20 pack years (packs per day multiplied by number of years you smoked); (5) You get a written order from a healthcare provider  3  Glaucoma Screening: covered annually if you're considered high risk: (1) You have diabetes OR (2) Family history of glaucoma OR (3)  aged 48 and older OR (3)  American aged 72 and older  3  Osteoporosis Screening: covered every 2 years if you meet one of the following conditions: (1) You're estrogen deficient and at risk for osteoporosis based off medical history and other findings; (2) Have a vertebral abnormality; (3) On glucocorticoid therapy for more than 3 months; (4) Have primary hyperparathyroidism; (5) On osteoporosis medications and need to assess response to drug therapy  · Last bone density test (DXA Scan): 07/29/2020   5  HIV Screening: covered annually if you're between the age of 15-65   Also covered annually if you are younger than 13 and older than 72 with risk factors for HIV infection  For pregnant patients, it is covered up to 3 times per pregnancy  Immunizations:  Immunization Recommendations   Influenza Vaccine Annual influenza vaccination during flu season is recommended for all persons aged >= 6 months who do not have contraindications   Pneumococcal Vaccine   * Pneumococcal conjugate vaccine = PCV13 (Prevnar 13), PCV15 (Vaxneuvance), PCV20 (Prevnar 20)  * Pneumococcal polysaccharide vaccine = PPSV23 (Pneumovax) Adults 25-60 years old: 1-3 doses may be recommended based on certain risk factors  Adults 72 years old: 1-2 doses may be recommended based off what pneumonia vaccine you previously received   Hepatitis B Vaccine 3 dose series if at intermediate or high risk (ex: diabetes, end stage renal disease, liver disease)   Tetanus (Td) Vaccine - COST NOT COVERED BY MEDICARE PART B Following completion of primary series, a booster dose should be given every 10 years to maintain immunity against tetanus  Td may also be given as tetanus wound prophylaxis  Tdap Vaccine - COST NOT COVERED BY MEDICARE PART B Recommended at least once for all adults  For pregnant patients, recommended with each pregnancy  Shingles Vaccine (Shingrix) - COST NOT COVERED BY MEDICARE PART B  2 shot series recommended in those aged 48 and above     Health Maintenance Due:      Topic Date Due   • Hepatitis C Screening  Completed     Immunizations Due:      Topic Date Due   • COVID-19 Vaccine (5 - Moderna series) 07/28/2022     Advance Directives   What are advance directives? Advance directives are legal documents that state your wishes and plans for medical care  These plans are made ahead of time in case you lose your ability to make decisions for yourself  Advance directives can apply to any medical decision, such as the treatments you want, and if you want to donate organs  What are the types of advance directives?   There are many types of advance directives, and each state has rules about how to use them  You may choose a combination of any of the following:  · Living will: This is a written record of the treatment you want  You can also choose which treatments you do not want, which to limit, and which to stop at a certain time  This includes surgery, medicine, IV fluid, and tube feedings  · Durable power of  for healthcare [facility]): This is a written record that states who you want to make healthcare choices for you when you are unable to make them for yourself  This person, called a proxy, is usually a family member or a friend  You may choose more than 1 proxy  · Do not resuscitate (DNR) order:  A DNR order is used in case your heart stops beating or you stop breathing  It is a request not to have certain forms of treatment, such as CPR  A DNR order may be included in other types of advance directives  · Medical directive: This covers the care that you want if you are in a coma, near death, or unable to make decisions for yourself  You can list the treatments you want for each condition  Treatment may include pain medicine, surgery, blood transfusions, dialysis, IV or tube feedings, and a ventilator (breathing machine)  · Values history: This document has questions about your views, beliefs, and how you feel and think about life  This information can help others choose the care that you would choose  Why are advance directives important? An advance directive helps you control your care  Although spoken wishes may be used, it is better to have your wishes written down  Spoken wishes can be misunderstood, or not followed  Treatments may be given even if you do not want them  An advance directive may make it easier for your family to make difficult choices about your care     Weight Management   Why it is important to manage your weight:  Being overweight increases your risk of health conditions such as heart disease, high blood pressure, type 2 diabetes, and certain types of cancer  It can also increase your risk for osteoarthritis, sleep apnea, and other respiratory problems  Aim for a slow, steady weight loss  Even a small amount of weight loss can lower your risk of health problems  How to lose weight safely:  A safe and healthy way to lose weight is to eat fewer calories and get regular exercise  You can lose up about 1 pound a week by decreasing the number of calories you eat by 500 calories each day  Healthy meal plan for weight management:  A healthy meal plan includes a variety of foods, contains fewer calories, and helps you stay healthy  A healthy meal plan includes the following:  · Eat whole-grain foods more often  A healthy meal plan should contain fiber  Fiber is the part of grains, fruits, and vegetables that is not broken down by your body  Whole-grain foods are healthy and provide extra fiber in your diet  Some examples of whole-grain foods are whole-wheat breads and pastas, oatmeal, brown rice, and bulgur  · Eat a variety of vegetables every day  Include dark, leafy greens such as spinach, kale, tali greens, and mustard greens  Eat yellow and orange vegetables such as carrots, sweet potatoes, and winter squash  · Eat a variety of fruits every day  Choose fresh or canned fruit (canned in its own juice or light syrup) instead of juice  Fruit juice has very little or no fiber  · Eat low-fat dairy foods  Drink fat-free (skim) milk or 1% milk  Eat fat-free yogurt and low-fat cottage cheese  Try low-fat cheeses such as mozzarella and other reduced-fat cheeses  · Choose meat and other protein foods that are low in fat  Choose beans or other legumes such as split peas or lentils  Choose fish, skinless poultry (chicken or turkey), or lean cuts of red meat (beef or pork)  Before you cook meat or poultry, cut off any visible fat  · Use less fat and oil  Try baking foods instead of frying them   Add less fat, such as margarine, sour cream, regular salad dressing and mayonnaise to foods  Eat fewer high-fat foods  Some examples of high-fat foods include french fries, doughnuts, ice cream, and cakes  · Eat fewer sweets  Limit foods and drinks that are high in sugar  This includes candy, cookies, regular soda, and sweetened drinks  Exercise:  Exercise at least 30 minutes per day on most days of the week  Some examples of exercise include walking, biking, dancing, and swimming  You can also fit in more physical activity by taking the stairs instead of the elevator or parking farther away from stores  Ask your healthcare provider about the best exercise plan for you  © Copyright [employer] 2018 Information is for End User's use only and may not be sold, redistributed or otherwise used for commercial purposes   All illustrations and images included in CareNotes® are the copyrighted property of A [employer] M [employer]  or [address] [place] [address]

## 2023-06-26 NOTE — PROGRESS NOTES
Assessment and Plan:     Problem List Items Addressed This Visit    None  Visit Diagnoses     Medicare annual wellness visit, subsequent    -  Primary           Preventive health issues were discussed with patient, and age appropriate screening tests were ordered as noted in patient's After Visit Summary  Personalized health advice and appropriate referrals for health education or preventive services given if needed, as noted in patient's After Visit Summary       History of Present Illness:     Patient presents for a Medicare Wellness Visit    HPI   Patient Care Team:  Carolyn Coto MD as PCP - General (Family Medicine)  MD Felton Vincent MD (Gastroenterology)  Stone Salguero MD (Urology)  Kenna Trent MD (Ophthalmology)  Jie Denis MD (Sports Medicine)     Review of Systems:     Review of Systems     Problem List:     Patient Active Problem List   Diagnosis   • Hyperlipidemia   • Fatty liver   • Glaucoma   • Cataract   • Colonic polyp   • Hearing loss   • Essential hypertension   • Chronic right shoulder pain   • Acquired complex renal cyst   • Stage 3a chronic kidney disease ([address] )   • Acute pain of right knee   • Family history of breast cancer in mother      Past Medical and Surgical History:     Past Medical History:   Diagnosis Date   • Arthritis 1995    Chronic problem   • Diverticulosis 07/24/2018   • Family history of breast cancer in mother 2/27/2023   • GERD with esophagitis 07/24/2018   • Glaucoma 7/24/2018   • Hearing loss 07/24/2018    Wears bilateral hearing aids   • Hyperlipidemia 07/24/2018   • Hypertension    • Stage 3a chronic kidney disease ([address] ) 02/27/2023     Past Surgical History:   Procedure Laterality Date   • ABDOMINAL AORTIC ANEURYSM REPAIR Right 2007    Femoral artery-Dr Felton Jang   • TUBAL LIGATION Bilateral       Family History:     Family History   Problem Relation Age of Onset   • Breast cancer Mother 46   • Heart attack Father    • Coronary artery disease Father    • Arthritis Father            • Heart disease Father            • Colon polyps Brother    • No Known Problems Maternal Grandmother    • No Known Problems Maternal Grandfather    • No Known Problems Paternal Grandmother    • No Known Problems Paternal Grandfather    • No Known Problems Son    • No Known Problems Maternal Aunt    • No Known Problems Maternal Aunt    • No Known Problems Maternal Aunt    • No Known Problems Maternal Aunt    • No Known Problems Maternal Aunt       Social History:     Social History     Socioeconomic History   • Marital status:      Spouse name: None   • Number of children: None   • Years of education: None   • Highest education level: None   Occupational History   • None   Tobacco Use   • Smoking status: Former     Packs/day: 0 25     Years: 15 00     Total pack years: 3 75     Types: Cigarettes     Quit date: 10/20/2003     Years since quittin 6   • Smokeless tobacco: Never   • Tobacco comments:     I did quit   Vaping Use   • Vaping Use: Never used   Substance and Sexual Activity   • Alcohol use: Yes     Alcohol/week: 0 0 standard drinks of alcohol     Comment: I have a beer or mixed drink when in a restaurant   • Drug use: No   • Sexual activity: Not Currently   Other Topics Concern   • None   Social History Narrative     since 13  Lives alone  Has a boyfriend for 20 years  He has his own place  [facility] [place]  1 child  2 grandchildren  One great grandchild  In [place]  Enjoys reading, going out for dinner  Had worked at [employer]       Social Determinants of Health     Financial Resource Strain: Not on file   Food Insecurity: Not on file   Transportation Needs: Not on file   Physical Activity: Not on file   Stress: Not on file   Social Connections: Not on file   Intimate Partner Violence: Not on file   Housing Stability: Not on file      Medications and Allergies:     Current Outpatient Medications   Medication Sig Dispense Refill   • Acetaminophen (TYLENOL ARTHRITIS EXT RELIEF PO) Take by mouth     • ASPERCREME LIDOCAINE EX Apply topically     • bimatoprost (LUMIGAN) 0 01 % ophthalmic drops Apply 1 drop to eye Daily     • brimonidine (ALPHAGAN P) 0 1 % Apply 1 drop to eye every 12 (twelve) hours     • Calcium Carbonate-Vitamin D (CALTRATE 600+D PO) Take 2 tablets by mouth daily      • cholecalciferol (VITAMIN D3) 1,000 units tablet Take 1,000 Units by mouth daily     • diltiazem (CARDIZEM LA) 240 MG 24 hr tablet Take 1 tablet (240 mg total) by mouth daily 90 tablet 3   • ezetimibe (ZETIA) 10 mg tablet Take 1 tablet (10 mg total) by mouth daily 90 tablet 3   • Multiple Vitamins-Minerals (CENTRUM SILVER ULTRA WOMENS PO) Take 1 tablet by mouth daily     • olmesartan (Benicar) 20 mg tablet Take 1 tablet (20 mg total) by mouth daily 90 tablet 3   • rosuvastatin (CRESTOR) 10 MG tablet Take 1 tablet (10 mg total) by mouth daily 90 tablet 3     No current facility-administered medications for this visit  Allergies   Allergen Reactions   • Lansoprazole Diarrhea   • Naproxen    • Nsaids      Other reaction(s): renal dysfunction  Category: Adverse Reaction;    • Sulfa Antibiotics Hives   • Trimethoprim    • Amoxicillin Fever and Rash     Category:  Allergy;       Immunizations:     Immunization History   Administered Date(s) Administered   • COVID-19 MODERNA VACC 0 5 ML IM 01/21/2021, 02/18/2021, 11/29/2021, 06/02/2022   • Fluzone Split Quad 0 25 mL 09/22/2016   • INFLUENZA 10/13/2015, 09/22/2016, 09/14/2017, 10/25/2018, 11/01/2022   • Influenza Split 10/17/2013   • Influenza Split High Dose Preservative Free IM 09/14/2017   • Influenza, high dose seasonal 0 7 mL 10/25/2018, 10/02/2019, 10/14/2020, 10/21/2021   • Influenza, seasonal, injectable 09/23/2011, 10/29/2014   • Pneumococcal Conjugate 13-Valent 09/14/2017   • Pneumococcal Conjugate Vaccine 20-valent (Pcv20), Polysace 07/28/2022   • Pneumococcal Polysaccharide PPV23 11/18/2015   • Tdap 08/05/2019      Health Maintenance:         Topic Date Due   • Hepatitis C Screening  Completed         Topic Date Due   • COVID-19 Vaccine (5 - Moderna series) 07/28/2022      Medicare Screening Tests and Risk Assessments:     Reggie Denver is here for her Subsequent Wellness visit  Health Risk Assessment:   Patient rates overall health as fair  Patient feels that their physical health rating is same  Patient is satisfied with their life  Eyesight was rated as same  Hearing was rated as same  Patient feels that their emotional and mental health rating is same  Patients states they are never, rarely angry  Patient states they are never, rarely unusually tired/fatigued  Pain experienced in the last 7 days has been some  Patient's pain rating has been 3/10  Patient states that she has experienced no weight loss or gain in last 6 months  Depression Screening:   PHQ-2 Score: 0      Fall Risk Screening: In the past year, patient has experienced: no history of falling in past year      Urinary Incontinence Screening:   Patient has not leaked urine accidently in the last six months  Home Safety:  Patient does not have trouble with stairs inside or outside of their home  Patient has working smoke alarms and has working carbon monoxide detector  Home safety hazards include: none  Nutrition:   Current diet is Regular  Medications:   Patient is currently taking over-the-counter supplements  OTC medications include: see medication list  Patient is able to manage medications  Activities of Daily Living (ADLs)/Instrumental Activities of Daily Living (IADLs):   Walk and transfer into and out of bed and chair?: Yes  Dress and groom yourself?: Yes    Bathe or shower yourself?: Yes    Feed yourself?  Yes  Do your laundry/housekeeping?: Yes  Manage your money, pay your bills and track your expenses?: Yes  Make your own meals?: Yes    Do your own shopping?: Yes    Previous Hospitalizations:   Any "hospitalizations or ED visits within the last 12 months?: No      Advance Care Planning:   Living will: Yes    Durable POA for healthcare: Yes    Advanced directive: Yes      PREVENTIVE SCREENINGS      Cardiovascular Screening:    General: Screening Not Indicated and History Lipid Disorder      Diabetes Screening:     General: Screening Not Indicated and History Diabetes      Breast Cancer Screening:     General: Screening Current      Cervical Cancer Screening:    General: Screening Not Indicated      Lung Cancer Screening:     General: Screening Not Indicated      Hepatitis C Screening:    General: Screening Current    Screening, Brief Intervention, and Referral to Treatment (SBIRT)    Screening    Typical number of drinks in a week: 2    Single Item Drug Screening:  How often have you used an illegal drug (including marijuana) or a prescription medication for non-medical reasons in the past year? never    Single Item Drug Screen Score: 0  Interpretation: Negative screen for possible drug use disorder    No results found       Physical Exam:     /76 (BP Location: Left arm, Patient Position: Sitting, Cuff Size: Standard)   Pulse 88   Temp (!) 97 3 °F (36 3 °C) (Temporal)   Ht 5' 6\" (1 676 m)   Wt 75 3 kg (166 lb)   SpO2 97%   BMI 26 79 kg/m²     Physical Exam     Venkata Dumont MD  "

## 2023-06-26 NOTE — PATIENT INSTRUCTIONS
Looks great  Blood pressure well controlled  Medicare wellness exam is completed  A booster for COVID is recommended  The new one targeted toward the Omicron variant  Must be obtained at local drugstore  Recheck in 4 months  Medicare Preventive Visit Patient Instructions  Thank you for completing your Welcome to Medicare Visit or Medicare Annual Wellness Visit today  Your next wellness visit will be due in one year (6/26/2024)  The screening/preventive services that you may require over the next 5-10 years are detailed below  Some tests may not apply to you based off risk factors and/or age  Screening tests ordered at today's visit but not completed yet may show as past due  Also, please note that scanned in results may not display below  Preventive Screenings:  Service Recommendations Previous Testing/Comments   Colorectal Cancer Screening  * Colonoscopy    * Fecal Occult Blood Test (FOBT)/Fecal Immunochemical Test (FIT)  * Fecal DNA/Cologuard Test  * Flexible Sigmoidoscopy Age: 39-70 years old   Colonoscopy: every 10 years (may be performed more frequently if at higher risk)  OR  FOBT/FIT: every 1 year  OR  Cologuard: every 3 years  OR  Sigmoidoscopy: every 5 years  Screening may be recommended earlier than age 39 if at higher risk for colorectal cancer  Also, an individualized decision between you and your healthcare provider will decide whether screening between the ages of 74-80 would be appropriate  Colonoscopy: Not on file  FOBT/FIT: Not on file  Cologuard: Not on file  Sigmoidoscopy: Not on file          Breast Cancer Screening Age: 36 years old  Frequency: every 1-2 years  Not required if history of left and right mastectomy Mammogram: 06/21/2023        Cervical Cancer Screening Between the ages of 21-29, pap smear recommended once every 3 years  Between the ages of 33-67, can perform pap smear with HPV co-testing every 5 years     Recommendations may differ for women with a history of total hysterectomy, cervical cancer, or abnormal pap smears in past  Pap Smear: Not on file        Hepatitis C Screening Once for adults born between 1945 and 1965  More frequently in patients at high risk for Hepatitis C Hep C Antibody: 07/09/2020        Diabetes Screening 1-2 times per year if you're at risk for diabetes or have pre-diabetes Fasting glucose: No results in last 5 years (No results in last 5 years)  A1C: 6 0 % (2/17/2023)      Cholesterol Screening Once every 5 years if you don't have a lipid disorder  May order more often based on risk factors  Lipid panel: 02/17/2023          Other Preventive Screenings Covered by Medicare:  Abdominal Aortic Aneurysm (AAA) Screening: covered once if your at risk  You're considered to be at risk if you have a family history of AAA  Lung Cancer Screening: covers low dose CT scan once per year if you meet all of the following conditions: (1) Age 50-69; (2) No signs or symptoms of lung cancer; (3) Current smoker or have quit smoking within the last 15 years; (4) You have a tobacco smoking history of at least 20 pack years (packs per day multiplied by number of years you smoked); (5) You get a written order from a healthcare provider  Glaucoma Screening: covered annually if you're considered high risk: (1) You have diabetes OR (2) Family history of glaucoma OR (3)  aged 48 and older OR (3)  American aged 72 and older  Osteoporosis Screening: covered every 2 years if you meet one of the following conditions: (1) You're estrogen deficient and at risk for osteoporosis based off medical history and other findings; (2) Have a vertebral abnormality; (3) On glucocorticoid therapy for more than 3 months; (4) Have primary hyperparathyroidism; (5) On osteoporosis medications and need to assess response to drug therapy  Last bone density test (DXA Scan): 07/29/2020  HIV Screening: covered annually if you're between the age of 12-76   Also covered annually if you are younger than 13 and older than 72 with risk factors for HIV infection  For pregnant patients, it is covered up to 3 times per pregnancy  Immunizations:  Immunization Recommendations   Influenza Vaccine Annual influenza vaccination during flu season is recommended for all persons aged >= 6 months who do not have contraindications   Pneumococcal Vaccine   * Pneumococcal conjugate vaccine = PCV13 (Prevnar 13), PCV15 (Vaxneuvance), PCV20 (Prevnar 20)  * Pneumococcal polysaccharide vaccine = PPSV23 (Pneumovax) Adults 25-60 years old: 1-3 doses may be recommended based on certain risk factors  Adults 72 years old: 1-2 doses may be recommended based off what pneumonia vaccine you previously received   Hepatitis B Vaccine 3 dose series if at intermediate or high risk (ex: diabetes, end stage renal disease, liver disease)   Tetanus (Td) Vaccine - COST NOT COVERED BY MEDICARE PART B Following completion of primary series, a booster dose should be given every 10 years to maintain immunity against tetanus  Td may also be given as tetanus wound prophylaxis  Tdap Vaccine - COST NOT COVERED BY MEDICARE PART B Recommended at least once for all adults  For pregnant patients, recommended with each pregnancy  Shingles Vaccine (Shingrix) - COST NOT COVERED BY MEDICARE PART B  2 shot series recommended in those aged 48 and above     Health Maintenance Due:      Topic Date Due    Hepatitis C Screening  Completed     Immunizations Due:      Topic Date Due    COVID-19 Vaccine (5 - Moderna series) 07/28/2022     Advance Directives   What are advance directives? Advance directives are legal documents that state your wishes and plans for medical care  These plans are made ahead of time in case you lose your ability to make decisions for yourself  Advance directives can apply to any medical decision, such as the treatments you want, and if you want to donate organs  What are the types of advance directives?   There are many types of advance directives, and each state has rules about how to use them  You may choose a combination of any of the following:  Living will: This is a written record of the treatment you want  You can also choose which treatments you do not want, which to limit, and which to stop at a certain time  This includes surgery, medicine, IV fluid, and tube feedings  Durable power of  for healthcare [place] [facility]): This is a written record that states who you want to make healthcare choices for you when you are unable to make them for yourself  This person, called a proxy, is usually a family member or a friend  You may choose more than 1 proxy  Do not resuscitate (DNR) order:  A DNR order is used in case your heart stops beating or you stop breathing  It is a request not to have certain forms of treatment, such as CPR  A DNR order may be included in other types of advance directives  Medical directive: This covers the care that you want if you are in a coma, near death, or unable to make decisions for yourself  You can list the treatments you want for each condition  Treatment may include pain medicine, surgery, blood transfusions, dialysis, IV or tube feedings, and a ventilator (breathing machine)  Values history: This document has questions about your views, beliefs, and how you feel and think about life  This information can help others choose the care that you would choose  Why are advance directives important? An advance directive helps you control your care  Although spoken wishes may be used, it is better to have your wishes written down  Spoken wishes can be misunderstood, or not followed  Treatments may be given even if you do not want them  An advance directive may make it easier for your family to make difficult choices about your care     Weight Management   Why it is important to manage your weight:  Being overweight increases your risk of health conditions such as heart disease, high blood pressure, type 2 diabetes, and certain types of cancer  It can also increase your risk for osteoarthritis, sleep apnea, and other respiratory problems  Aim for a slow, steady weight loss  Even a small amount of weight loss can lower your risk of health problems  How to lose weight safely:  A safe and healthy way to lose weight is to eat fewer calories and get regular exercise  You can lose up about 1 pound a week by decreasing the number of calories you eat by 500 calories each day  Healthy meal plan for weight management:  A healthy meal plan includes a variety of foods, contains fewer calories, and helps you stay healthy  A healthy meal plan includes the following:  Eat whole-grain foods more often  A healthy meal plan should contain fiber  Fiber is the part of grains, fruits, and vegetables that is not broken down by your body  Whole-grain foods are healthy and provide extra fiber in your diet  Some examples of whole-grain foods are whole-wheat breads and pastas, oatmeal, brown rice, and bulgur  Eat a variety of vegetables every day  Include dark, leafy greens such as spinach, kale, tali greens, and mustard greens  Eat yellow and orange vegetables such as carrots, sweet potatoes, and winter squash  Eat a variety of fruits every day  Choose fresh or canned fruit (canned in its own juice or light syrup) instead of juice  Fruit juice has very little or no fiber  Eat low-fat dairy foods  Drink fat-free (skim) milk or 1% milk  Eat fat-free yogurt and low-fat cottage cheese  Try low-fat cheeses such as mozzarella and other reduced-fat cheeses  Choose meat and other protein foods that are low in fat  Choose beans or other legumes such as split peas or lentils  Choose fish, skinless poultry (chicken or turkey), or lean cuts of red meat (beef or pork)  Before you cook meat or poultry, cut off any visible fat  Use less fat and oil  Try baking foods instead of frying them   Add less fat, such as margarine, sour cream, regular salad dressing and mayonnaise to foods  Eat fewer high-fat foods  Some examples of high-fat foods include french fries, doughnuts, ice cream, and cakes  Eat fewer sweets  Limit foods and drinks that are high in sugar  This includes candy, cookies, regular soda, and sweetened drinks  Exercise:  Exercise at least 30 minutes per day on most days of the week  Some examples of exercise include walking, biking, dancing, and swimming  You can also fit in more physical activity by taking the stairs instead of the elevator or parking farther away from stores  Ask your healthcare provider about the best exercise plan for you  © Copyright [employer] 2018 Information is for End User's use only and may not be sold, redistributed or otherwise used for commercial purposes   All illustrations and images included in CareNotes® are the copyrighted property of A D A M , Inc  or [address] [place] [address]

## 2023-09-08 ENCOUNTER — HOSPITAL ENCOUNTER (OUTPATIENT)
Dept: ULTRASOUND IMAGING | Facility: HOSPITAL | Age: 83
Discharge: HOME | End: 2023-09-08
Payer: MEDICARE

## 2023-09-08 DIAGNOSIS — N28.1 COMPLEX RENAL CYST: ICD-10-CM

## 2023-09-08 PROCEDURE — 76775 US EXAM ABDO BACK WALL LIM: CPT

## 2023-09-11 NOTE — PROGRESS NOTES
Daily Note     Today's date: 3/18/2022  Patient name: Theodore Ricks  : 1940  MRN: [id number]  Referring provider: Raffi Garcia*  Dx:   Encounter Diagnosis     ICD-10-CM    1  Left hip pain  M25 552    2  Trochanteric bursitis of left hip  M70 62                   Subjective: Pt reports continued improvement since last session  Pt reports she is negotiating stairs with less difficulty and feels it's near back to normal  She continues to require a hand rail for assistance  "My hip has hurt far less than when I started "       Objective: See treatment diary below      Assessment: Tolerated treatment well  Pt continuing to demonstrate improved strengthening throughout session, without reports of pain during progressions  Continue to progress as tolerated  Patient would benefit from continued PT  1:1 with Erica Carson DPT for entirety of tx  Plan: Continue per plan of care        Pertinent Findings and Outcome Measures:                                                                                                                                                                         Test / Measure  3/2/2022      FOTO 53      Hip abd MMT 3+/5      SLS 4 sec      Hip ext MMT 4-/5            Precautions: Hard of hearing, OA      Manuals 3/2 3/3 3/8 3/11 3/14 3/18       Hamstring/quad/ITB stretch  5' SE  CM 5' CM 5' CM 5'       L Hip LAD   SE Gr III 3'   CM Gr III 3' CM Gr III 3' CM Gr III 3'                                 Neuro Re-Ed             SLR     x10 x12       Mini squats   2x10 2x10 x10 x10       Reverse hypers             Supine Hip Abd   gtb 3x8 3" hold 3x10 gtb                                                 Ther Ex             NS  10' L5 10' L5 10' L5 10' L5 10' L5       Prone quad stretch, strap 30"x2 hep 30"x2           Supine hamstring stretch, strap 30"x2 hep 30"x2 3x30" np         Bridge x20 hep 2x15 2x15 3x10 2x15 np       Standing hip abd x10 hep 2x10 B 2x15 b/l 2x10 B 2 5# np        Prone hip ext    2x8 2x10 2x10       STS   10x w/foam 2x10 chair +foam 2x10 chair +foam 2x10 chair +foam chair NV      SL hip abd    2x8 2x10 2x10       Piriformis stretch    30"x3  30"x3  30"x3        L/s ext standing  2x10           Clamshells    2x10 supine gtb 2x10 supine gtb 2x10 supine btb       Standing hip flexor stretch    30"x2 30"x2 30"x2 dc      Leg press     x10 55# 2x10 55#       Ther Activity             Step ups   15x ea LSU/FSU 6" held 4" NV 2x10 4" fwd 6" NV      Side stepping   3 laps  x4 laps otb        Gait Training                                       Modalities Post-Care Instructions: I reviewed with the patient in detail post-care instructions. Patient is to wear sunprotection, and avoid picking at any of the treated lesions. Pt may apply Vaseline to crusted or scabbing areas. Render Post-Care Instructions In Note?: no Warning: This plan will only bill for destructions on the Penis and Vulva. If you select the Scrotum it will not bill. Anesthesia Volume In Cc: 0.5 Detail Level: Detailed Method: liquid nitrogen Consent: The patient's consent was obtained including but not limited to risks of crusting, scabbing, blistering, scarring, darker or lighter pigmentary change, recurrence, incomplete removal and infection. Medical Necessity Clause: This procedure was medically necessary because the lesions that were treated were: Medical Necessity Information: It is in your best interest to select a reason for this procedure from the list below. All of these items fulfill various CMS LCD requirements except the new and changing color options.

## 2023-09-13 ENCOUNTER — TELEPHONE (OUTPATIENT)
Age: 83
End: 2023-09-13

## 2023-09-13 NOTE — TELEPHONE ENCOUNTER
Pt called requested a call to go over the [address] results that was done on 9/8/23    Please review

## 2023-09-15 NOTE — TELEPHONE ENCOUNTER
Spoke to pt and advised:    Wilber Ang PA-C 9 minutes ago (12:59 PM)     stable small renal cysts no change no concerns

## 2023-09-15 NOTE — TELEPHONE ENCOUNTER
Called radiology reading room and requested US be read today. Called pt and left msg on VM that she will be contacted with results as soon as we receive them and they are reviewed by a provider.

## 2023-10-23 ENCOUNTER — OFFICE VISIT (OUTPATIENT)
Dept: FAMILY MEDICINE CLINIC | Facility: CLINIC | Age: 83
End: 2023-10-23
Payer: MEDICARE

## 2023-10-23 VITALS
OXYGEN SATURATION: 97 % | HEIGHT: 66 IN | HEART RATE: 96 BPM | DIASTOLIC BLOOD PRESSURE: 76 MMHG | SYSTOLIC BLOOD PRESSURE: 130 MMHG | WEIGHT: 166.2 LBS | BODY MASS INDEX: 26.71 KG/M2 | TEMPERATURE: 97.4 F

## 2023-10-23 DIAGNOSIS — I10 ESSENTIAL HYPERTENSION: Primary | ICD-10-CM

## 2023-10-23 DIAGNOSIS — E78.00 PURE HYPERCHOLESTEROLEMIA: ICD-10-CM

## 2023-10-23 DIAGNOSIS — J31.0 CHRONIC RHINITIS: ICD-10-CM

## 2023-10-23 DIAGNOSIS — N18.31 STAGE 3A CHRONIC KIDNEY DISEASE (HCC): ICD-10-CM

## 2023-10-23 DIAGNOSIS — R73.03 PREDIABETES: ICD-10-CM

## 2023-10-23 DIAGNOSIS — Z23 NEEDS FLU SHOT: ICD-10-CM

## 2023-10-23 LAB — SL AMB POCT HEMOGLOBIN AIC: 6.2 (ref ?–6.5)

## 2023-10-23 PROCEDURE — 90662 IIV NO PRSV INCREASED AG IM: CPT

## 2023-10-23 PROCEDURE — G0008 ADMIN INFLUENZA VIRUS VAC: HCPCS

## 2023-10-23 PROCEDURE — 83036 HEMOGLOBIN GLYCOSYLATED A1C: CPT | Performed by: FAMILY MEDICINE

## 2023-10-23 PROCEDURE — 99214 OFFICE O/P EST MOD 30 MIN: CPT | Performed by: FAMILY MEDICINE

## 2023-10-23 RX ORDER — IPRATROPIUM BROMIDE 42 UG/1
2 SPRAY, METERED NASAL 4 TIMES DAILY
Qty: 15 ML | Refills: 5 | Status: SHIPPED | OUTPATIENT
Start: 2023-10-23

## 2023-10-23 NOTE — PROGRESS NOTES
Chief Complaint   Patient presents with   • Follow-up     4 month         HPI   Here for follow-up of hypertension, hyperlipidemia, prediabetes, and stage III chronic kidney disease. Notes that she will be flying to [place]. Uses a Sudafed in the airplane. Had a cold at the beginning of the month. Still has some postnasal drip which she thinks might be chronic. Notes that she is an old lady flying to [place] by herself with hearing aids. The bus goes from [address] [place] [address] to [facility] and a flight to [place]. Notes that her knee is feeling better after getting viscosupplementation. Past Medical History:   Diagnosis Date   • Arthritis     Chronic problem   • Diverticulosis 2018   • Family history of breast cancer in mother 2023   • GERD with esophagitis 2018   • Glaucoma 2018   • Hearing loss 2018    Wears bilateral hearing aids   • Hyperlipidemia 2018   • Hypertension    • Prediabetes 2023   • Stage 3a chronic kidney disease ([address]) 2023        Past Surgical History:   Procedure Laterality Date   • ABDOMINAL AORTIC ANEURYSM REPAIR Right 2007    Femoral artery-Dr. Rita Murphy   • TUBAL LIGATION Bilateral        Social History     Tobacco Use   • Smoking status: Former     Packs/day: 0.25     Years: 15.00     Total pack years: 3.75     Types: Cigarettes     Quit date: 10/20/2003     Years since quittin.0   • Smokeless tobacco: Never   • Tobacco comments:     I did quit   Substance Use Topics   • Alcohol use: Yes     Alcohol/week: 0.0 standard drinks of alcohol     Comment: I have a beer or mixed drink when in a restaurant       Social History     Social History Narrative     since . Lives alone. Has a boyfriend for 20 years. He has his own place. [place]. 1 child. 2 grandchildren. One great grandchild. In [place]. Enjoys reading, going out for dinner. Had worked at [employer].         The following portions of the patient's history were reviewed and updated as appropriate: allergies, current medications, past family history, past medical history, past social history, past surgical history, and problem list.      Review of Systems       /76 (BP Location: Left arm, Patient Position: Sitting, Cuff Size: Standard)   Pulse 96   Temp (!) 97.4 °F (36.3 °C) (Temporal)   Ht 5' 6" (1.676 m)   Wt 75.4 kg (166 lb 3.2 oz)   SpO2 97%   BMI 26.83 kg/m²      Physical Exam   Appears well. Lungs are clear. Heart regular with no murmur. Abdomen soft and nontender. No edema. Mood is upbeat. Affect appropriate. A1c 6.2. Current Outpatient Medications:   •  Acetaminophen (TYLENOL ARTHRITIS EXT RELIEF PO), Take by mouth, Disp: , Rfl:   •  ASPERCREME LIDOCAINE EX, Apply topically, Disp: , Rfl:   •  bimatoprost (LUMIGAN) 0.01 % ophthalmic drops, Apply 1 drop to eye Daily, Disp: , Rfl:   •  brimonidine (ALPHAGAN P) 0.1 %, Apply 1 drop to eye every 12 (twelve) hours, Disp: , Rfl:   •  Calcium Carbonate-Vitamin D (CALTRATE 600+D PO), Take 2 tablets by mouth daily , Disp: , Rfl:   •  cholecalciferol (VITAMIN D3) 1,000 units tablet, Take 1,000 Units by mouth daily, Disp: , Rfl:   •  diltiazem (CARDIZEM LA) 240 MG 24 hr tablet, Take 1 tablet (240 mg total) by mouth daily, Disp: 90 tablet, Rfl: 3  •  ezetimibe (ZETIA) 10 mg tablet, Take 1 tablet (10 mg total) by mouth daily, Disp: 90 tablet, Rfl: 3  •  ipratropium (ATROVENT) 0.06 % nasal spray, 2 sprays into each nostril 4 (four) times a day, Disp: 15 mL, Rfl: 5  •  Multiple Vitamins-Minerals (CENTRUM SILVER ULTRA WOMENS PO), Take 1 tablet by mouth daily, Disp: , Rfl:   •  olmesartan (Benicar) 20 mg tablet, Take 1 tablet (20 mg total) by mouth daily, Disp: 90 tablet, Rfl: 3  •  rosuvastatin (CRESTOR) 10 MG tablet, Take 1 tablet (10 mg total) by mouth daily, Disp: 90 tablet, Rfl: 3     No problem-specific Assessment & Plan notes found for this encounter.        Diagnoses and all orders for this visit:    Essential hypertension    Prediabetes  -     POCT hemoglobin A1c    Pure hypercholesterolemia  -     Lipid panel; Future    Stage 3a chronic kidney disease (HCC)  -     Basic metabolic panel; Future    Needs flu shot  -     influenza vaccine, high-dose, PF 0.7 mL (FLUZONE HIGH-DOSE)    Chronic rhinitis  -     ipratropium (ATROVENT) 0.06 % nasal spray; 2 sprays into each nostril 4 (four) times a day        Patient Instructions   Blood pressure is well controlled. A1c 6.2, in the prediabetic range. Same medications. Trial of Atrovent or ipratropium nose spray 2 sprays each nostril up to 4 times a day for chronic rhinitis/postnasal drip. Advised of possible dry mouth. Flu shot. Recommend COVID booster in local drugstore. Also recommended is the new RSV vaccine. Have fun in [place]! .  Recheck in 4 months.

## 2023-10-23 NOTE — PATIENT INSTRUCTIONS
Blood pressure is well controlled. A1c 6.2, in the prediabetic range. Same medications. Trial of Atrovent or ipratropium nose spray 2 sprays each nostril up to 4 times a day for chronic rhinitis/postnasal drip. Advised of possible dry mouth. Flu shot. Recommend COVID booster in local drugstore. Also recommended is the new RSV vaccine. Have fun in [place]! .  Recheck in 4 months.

## 2024-02-29 ENCOUNTER — OFFICE VISIT (OUTPATIENT)
Dept: FAMILY MEDICINE CLINIC | Facility: CLINIC | Age: 84
End: 2024-02-29
Payer: MEDICARE

## 2024-02-29 ENCOUNTER — TELEPHONE (OUTPATIENT)
Age: 84
End: 2024-02-29

## 2024-02-29 VITALS
DIASTOLIC BLOOD PRESSURE: 70 MMHG | HEIGHT: 66 IN | SYSTOLIC BLOOD PRESSURE: 124 MMHG | WEIGHT: 167 LBS | OXYGEN SATURATION: 97 % | BODY MASS INDEX: 26.84 KG/M2 | HEART RATE: 83 BPM | TEMPERATURE: 97.4 F

## 2024-02-29 DIAGNOSIS — R73.03 PREDIABETES: Primary | ICD-10-CM

## 2024-02-29 DIAGNOSIS — I10 ESSENTIAL HYPERTENSION: ICD-10-CM

## 2024-02-29 DIAGNOSIS — N18.31 STAGE 3A CHRONIC KIDNEY DISEASE (HCC): ICD-10-CM

## 2024-02-29 DIAGNOSIS — E78.00 PURE HYPERCHOLESTEROLEMIA: ICD-10-CM

## 2024-02-29 PROCEDURE — 99214 OFFICE O/P EST MOD 30 MIN: CPT | Performed by: FAMILY MEDICINE

## 2024-02-29 RX ORDER — ROSUVASTATIN CALCIUM 10 MG/1
10 TABLET, COATED ORAL DAILY
Qty: 90 TABLET | Refills: 3 | Status: SHIPPED | OUTPATIENT
Start: 2024-02-29

## 2024-02-29 RX ORDER — DILTIAZEM HYDROCHLORIDE 240 MG/1
240 CAPSULE, COATED, EXTENDED RELEASE ORAL DAILY
Qty: 90 CAPSULE | Refills: 3 | Status: SHIPPED | OUTPATIENT
Start: 2024-02-29

## 2024-02-29 RX ORDER — EZETIMIBE 10 MG/1
10 TABLET ORAL DAILY
Qty: 90 TABLET | Refills: 3 | Status: SHIPPED | OUTPATIENT
Start: 2024-02-29

## 2024-02-29 RX ORDER — OLMESARTAN MEDOXOMIL 20 MG/1
20 TABLET ORAL DAILY
Qty: 90 TABLET | Refills: 3 | Status: SHIPPED | OUTPATIENT
Start: 2024-02-29

## 2024-02-29 NOTE — PATIENT INSTRUCTIONS
Blood pressure nicely controlled.  Blood work was excellent in January.  Medications are reviewed and stay the same.  Recheck 4 months.

## 2024-02-29 NOTE — TELEPHONE ENCOUNTER
Patient called and was confused on what she read on her mychart about the   diltiazem .   She wanted to make sure it was on her meds list.  I advised it was

## 2024-02-29 NOTE — PROGRESS NOTES
Chief Complaint   Patient presents with    Follow-up     4 month         HPI   Here for follow-up of hypertension, hyperlipidemia, prediabetes, and stage III chronic kidney disease.   Had a good visit in [place].  At last visit, given ipratropium for chronic rhinitis.  He used it for a couple months but symptoms seem to have resolved and not needing it any longer.  In January, kidney function improved to GFR 61.  Cholesterol excellent at 156 with HDL of 68.  Grandson getting  in the [facility] in [facility]. He lives in [place].     Past Medical History:   Diagnosis Date    Arthritis     Chronic problem    Diverticulosis 2018    Family history of breast cancer in mother 2023    GERD with esophagitis 2018    Glaucoma 2018    Hearing loss 2018    Wears bilateral hearing aids    Hyperlipidemia 2018    Hypertension     Prediabetes 2023    Stage 3a chronic kidney disease (HCC) 2023        Past Surgical History:   Procedure Laterality Date    ABDOMINAL AORTIC ANEURYSM REPAIR Right     Femoral artery-Dr. Herrera    TUBAL LIGATION Bilateral        Social History     Tobacco Use    Smoking status: Former     Current packs/day: 0.00     Average packs/day: 0.3 packs/day for 15.0 years (3.8 ttl pk-yrs)     Types: Cigarettes     Start date: 10/20/1988     Quit date: 10/20/2003     Years since quittin.3    Smokeless tobacco: Never    Tobacco comments:     I did quit   Substance Use Topics    Alcohol use: Yes     Alcohol/week: 0.0 standard drinks of alcohol     Comment: I have a beer or mixed drink when in a restaurant       Social History     Social History Narrative     since .    Lives alone.  Has a boyfriend for 20 years.  He has his own place.[place].    1 child.  2 grandchildren.  One great grandchild.  In [place].    Enjoys reading, going out for dinner.    Had worked at [employer].    Enjoys the [employer] channel.        The following  "portions of the patient's history were reviewed and updated as appropriate: allergies, current medications, past family history, past medical history, past social history, past surgical history, and problem list.      Review of Systems       /70 (BP Location: Left arm, Patient Position: Sitting, Cuff Size: Standard)   Pulse 83   Temp (!) 97.4 °F (36.3 °C) (Temporal)   Ht 5' 6\" (1.676 m)   Wt 75.8 kg (167 lb)   SpO2 97%   BMI 26.95 kg/m²      Physical Exam   Appears well.  Lungs are clear.  Heart regular.  Soft systolic murmur at the base.  Abdomen nontender.  No edema.  Mood is upbeat.              Current Outpatient Medications:     Acetaminophen (TYLENOL ARTHRITIS EXT RELIEF PO), Take by mouth, Disp: , Rfl:     ASPERCREME LIDOCAINE EX, Apply topically, Disp: , Rfl:     bimatoprost (LUMIGAN) 0.01 % ophthalmic drops, Apply 1 drop to eye Daily, Disp: , Rfl:     brimonidine (ALPHAGAN P) 0.1 %, Apply 1 drop to eye every 12 (twelve) hours, Disp: , Rfl:     Calcium Carbonate-Vitamin D (CALTRATE 600+D PO), Take 2 tablets by mouth daily , Disp: , Rfl:     cholecalciferol (VITAMIN D3) 1,000 units tablet, Take 1,000 Units by mouth daily, Disp: , Rfl:     diltiazem (CARDIZEM CD) 240 mg 24 hr capsule, Take 1 capsule (240 mg total) by mouth daily, Disp: 90 capsule, Rfl: 3    ezetimibe (ZETIA) 10 mg tablet, Take 1 tablet (10 mg total) by mouth daily, Disp: 90 tablet, Rfl: 3    ipratropium (ATROVENT) 0.06 % nasal spray, 2 sprays into each nostril 4 (four) times a day, Disp: 15 mL, Rfl: 5    Multiple Vitamins-Minerals (CENTRUM SILVER ULTRA WOMENS PO), Take 1 tablet by mouth daily, Disp: , Rfl:     olmesartan (Benicar) 20 mg tablet, Take 1 tablet (20 mg total) by mouth daily, Disp: 90 tablet, Rfl: 3    rosuvastatin (CRESTOR) 10 MG tablet, Take 1 tablet (10 mg total) by mouth daily, Disp: 90 tablet, Rfl: 3     No problem-specific Assessment & Plan notes found for this encounter.       Diagnoses and all orders for this " visit:    Prediabetes    Essential hypertension  Comments:  Controlled .  Continue medication.  To follow with the dash diet  Orders:  -     diltiazem (CARDIZEM CD) 240 mg 24 hr capsule; Take 1 capsule (240 mg total) by mouth daily  -     olmesartan (Benicar) 20 mg tablet; Take 1 tablet (20 mg total) by mouth daily    Pure hypercholesterolemia  Comments:  To follow with low-fat diet  Orders:  -     rosuvastatin (CRESTOR) 10 MG tablet; Take 1 tablet (10 mg total) by mouth daily  -     ezetimibe (ZETIA) 10 mg tablet; Take 1 tablet (10 mg total) by mouth daily    Stage 3a chronic kidney disease (HCC)        Patient Instructions   Blood pressure nicely controlled.  Blood work was excellent in January.  Medications are reviewed and stay the same.  Recheck 4 months.

## 2024-03-06 ENCOUNTER — TELEPHONE (OUTPATIENT)
Age: 84
End: 2024-03-06

## 2024-03-06 NOTE — TELEPHONE ENCOUNTER
Rcvd call from patient concerned bc the Cardizem she received today from [facility] is a capsule and she usually gets a tablet. One is Cardizem CD and the other is cardizem LA. [facility] to [facility] with patient on the line and they confirmed that the meds are both long acting. One is a capsule and one is a tablet. Patient states she is ok with taking either one of them.

## 2024-03-06 NOTE — TELEPHONE ENCOUNTER
Pt called she picked up her medication and thinks there might be an error with her Cardizem.  Warm transfer to [facility]

## 2024-07-22 ENCOUNTER — OFFICE VISIT (OUTPATIENT)
Dept: FAMILY MEDICINE CLINIC | Facility: CLINIC | Age: 84
End: 2024-07-22
Payer: MEDICARE

## 2024-07-22 VITALS
HEART RATE: 71 BPM | HEIGHT: 66 IN | DIASTOLIC BLOOD PRESSURE: 60 MMHG | BODY MASS INDEX: 26.71 KG/M2 | TEMPERATURE: 97.1 F | SYSTOLIC BLOOD PRESSURE: 130 MMHG | WEIGHT: 166.2 LBS | OXYGEN SATURATION: 94 %

## 2024-07-22 DIAGNOSIS — Z00.00 MEDICARE ANNUAL WELLNESS VISIT, SUBSEQUENT: Primary | ICD-10-CM

## 2024-07-22 DIAGNOSIS — E78.00 PURE HYPERCHOLESTEROLEMIA: ICD-10-CM

## 2024-07-22 DIAGNOSIS — N18.31 STAGE 3A CHRONIC KIDNEY DISEASE (HCC): ICD-10-CM

## 2024-07-22 DIAGNOSIS — I10 ESSENTIAL HYPERTENSION: ICD-10-CM

## 2024-07-22 PROBLEM — M25.561 ACUTE PAIN OF RIGHT KNEE: Status: RESOLVED | Noted: 2023-02-27 | Resolved: 2024-07-22

## 2024-07-22 PROCEDURE — G0439 PPPS, SUBSEQ VISIT: HCPCS | Performed by: FAMILY MEDICINE

## 2024-07-22 PROCEDURE — 99214 OFFICE O/P EST MOD 30 MIN: CPT | Performed by: FAMILY MEDICINE

## 2024-07-22 NOTE — PROGRESS NOTES
Ambulatory Visit  Name: Cary Laws      : 1940      MRN: [id number]  Encounter Provider: Cuba Silver MD  Encounter Date: 2024   Encounter department: [facility]    Assessment & Plan   1. Medicare annual wellness visit, subsequent     Preventive health issues were discussed with patient, and age appropriate screening tests were ordered as noted in patient's After Visit Summary. Personalized health advice and appropriate referrals for health education or preventive services given if needed, as noted in patient's After Visit Summary.    History of Present Illness     HPI   Patient Care Team:  Cuba Silver MD as PCP - General (Family Medicine)  MD Josh Bae MD (Gastroenterology)  García Vázquez MD (Urology)  Rocky Dobbins MD (Ophthalmology)  Jerel Garber MD (Sports Medicine)    Review of Systems  Medical History Reviewed by provider this encounter:       Annual Wellness Visit Questionnaire   Cary is here for her Subsequent Wellness visit.     Health Risk Assessment:   Patient rates overall health as fair. Patient feels that their physical health rating is same. Patient is satisfied with their life. Eyesight was rated as same. Hearing was rated as same. Patient feels that their emotional and mental health rating is same. Patients states they are never, rarely angry. Patient states they are sometimes unusually tired/fatigued. Pain experienced in the last 7 days has been none. Patient states that she has experienced no weight loss or gain in last 6 months.     Depression Screening:   PHQ-2 Score: 0      Fall Risk Screening:   In the past year, patient has experienced: no history of falling in past year      Urinary Incontinence Screening:   Patient has not leaked urine accidently in the last six months.     Home Safety:  Patient does not have trouble with stairs inside or outside of their home. Patient has working smoke alarms and has working carbon  monoxide detector. Home safety hazards include: none.     Nutrition:   Current diet is Regular.     Medications:   Patient is currently taking over-the-counter supplements. OTC medications include: see medication list. Patient is able to manage medications.     Activities of Daily Living (ADLs)/Instrumental Activities of Daily Living (IADLs):   Walk and transfer into and out of bed and chair?: No  Dress and groom yourself?: No    Bathe or shower yourself?: No    Feed yourself? No  Do your laundry/housekeeping?: No  Manage your money, pay your bills and track your expenses?: No  Make your own meals?: No    Do your own shopping?: No    Previous Hospitalizations:   Any hospitalizations or ED visits within the last 12 months?: No      Advance Care Planning:   Living will: Yes    Advanced directive: Yes      PREVENTIVE SCREENINGS      Cardiovascular Screening:    General: Screening Not Indicated and History Lipid Disorder      Diabetes Screening:     General: Screening Current      Breast Cancer Screening:     General: Screening Current      Cervical Cancer Screening:    General: Screening Not Indicated      Lung Cancer Screening:     General: Screening Not Indicated      Hepatitis C Screening:    General: Screening Current    Screening, Brief Intervention, and Referral to Treatment (SBIRT)    Screening    Typical number of drinks in a week: 1    Single Item Drug Screening:  How often have you used an illegal drug (including marijuana) or a prescription medication for non-medical reasons in the past year? never    Single Item Drug Screen Score: 0  Interpretation: Negative screen for possible drug use disorder    Social Determinants of Health     Financial Resource Strain: Low Risk  (6/26/2023)    Overall Financial Resource Strain (CARDIA)     Difficulty of Paying Living Expenses: Not hard at all   Transportation Needs: No Transportation Needs (6/26/2023)    PRAPARE - Transportation     Lack of Transportation (Medical):  "No     Lack of Transportation (Non-Medical): No     No results found.    Objective     /67 (BP Location: Left arm, Patient Position: Sitting, Cuff Size: Adult)   Pulse 71   Temp (!) 97.1 °F (36.2 °C) (Temporal)   Ht 5' 6\" (1.676 m)   Wt 75.4 kg (166 lb 3.2 oz)   SpO2 94%   BMI 26.83 kg/m²     Physical Exam      "

## 2024-07-22 NOTE — PATIENT INSTRUCTIONS
Medicare wellness exam is completed.  Blood pressure is well-controlled on current medicine.  Explained that different generics look different but are the same medication.  Reasonable to get a mammogram every 2 years or not at all.  If she plans on living for 5 more years, then a mammogram is not a bad idea.  Not necessary to repeat ultrasound of kidneys.  Have a great time in [place]!  Recheck in 4 months.    Medicare Preventive Visit Patient Instructions  Thank you for completing your Welcome to Medicare Visit or Medicare Annual Wellness Visit today. Your next wellness visit will be due in one year (7/23/2025).  The screening/preventive services that you may require over the next 5-10 years are detailed below. Some tests may not apply to you based off risk factors and/or age. Screening tests ordered at today's visit but not completed yet may show as past due. Also, please note that scanned in results may not display below.  Preventive Screenings:  Service Recommendations Previous Testing/Comments   Colorectal Cancer Screening  * Colonoscopy    * Fecal Occult Blood Test (FOBT)/Fecal Immunochemical Test (FIT)  * Fecal DNA/Cologuard Test  * Flexible Sigmoidoscopy Age: 45-75 years old   Colonoscopy: every 10 years (may be performed more frequently if at higher risk)  OR  FOBT/FIT: every 1 year  OR  Cologuard: every 3 years  OR  Sigmoidoscopy: every 5 years  Screening may be recommended earlier than age 45 if at higher risk for colorectal cancer. Also, an individualized decision between you and your healthcare provider will decide whether screening between the ages of 76-85 would be appropriate. Colonoscopy: Not on file  FOBT/FIT: Not on file  Cologuard: Not on file  Sigmoidoscopy: Not on file          Breast Cancer Screening Age: 40+ years old  Frequency: every 1-2 years  Not required if history of left and right mastectomy Mammogram: 06/21/2023        Cervical Cancer Screening Between the ages of 21-29, pap smear  recommended once every 3 years.   Between the ages of 30-65, can perform pap smear with HPV co-testing every 5 years.   Recommendations may differ for women with a history of total hysterectomy, cervical cancer, or abnormal pap smears in past. Pap Smear: Not on file        Hepatitis C Screening Once for adults born between 1945 and 1965  More frequently in patients at high risk for Hepatitis C Hep C Antibody: 07/09/2020        Diabetes Screening 1-2 times per year if you're at risk for diabetes or have pre-diabetes Fasting glucose: No results in last 5 years (No results in last 5 years)  A1C: 6.2 (10/23/2023)      Cholesterol Screening Once every 5 years if you don't have a lipid disorder. May order more often based on risk factors. Lipid panel: 01/23/2024          Other Preventive Screenings Covered by Medicare:  Abdominal Aortic Aneurysm (AAA) Screening: covered once if your at risk. You're considered to be at risk if you have a family history of AAA.  Lung Cancer Screening: covers low dose CT scan once per year if you meet all of the following conditions: (1) Age 55-77; (2) No signs or symptoms of lung cancer; (3) Current smoker or have quit smoking within the last 15 years; (4) You have a tobacco smoking history of at least 20 pack years (packs per day multiplied by number of years you smoked); (5) You get a written order from a healthcare provider.  Glaucoma Screening: covered annually if you're considered high risk: (1) You have diabetes OR (2) Family history of glaucoma OR (3)  aged 50 and older OR (4)  American aged 65 and older  Osteoporosis Screening: covered every 2 years if you meet one of the following conditions: (1) You're estrogen deficient and at risk for osteoporosis based off medical history and other findings; (2) Have a vertebral abnormality; (3) On glucocorticoid therapy for more than 3 months; (4) Have primary hyperparathyroidism; (5) On osteoporosis medications and  need to assess response to drug therapy.   Last bone density test (DXA Scan): 07/29/2020.  HIV Screening: covered annually if you're between the age of 15-65. Also covered annually if you are younger than 15 and older than 65 with risk factors for HIV infection. For pregnant patients, it is covered up to 3 times per pregnancy.    Immunizations:  Immunization Recommendations   Influenza Vaccine Annual influenza vaccination during flu season is recommended for all persons aged >= 6 months who do not have contraindications   Pneumococcal Vaccine   * Pneumococcal conjugate vaccine = PCV13 (Prevnar 13), PCV15 (Vaxneuvance), PCV20 (Prevnar 20)  * Pneumococcal polysaccharide vaccine = PPSV23 (Pneumovax) Adults 19-63 yo with certain risk factors or if 65+ yo  If never received any pneumonia vaccine: recommend Prevnar 20 (PCV20)  Give PCV20 if previously received 1 dose of PCV13 or PPSV23   Hepatitis B Vaccine 3 dose series if at intermediate or high risk (ex: diabetes, end stage renal disease, liver disease)   Respiratory syncytial virus (RSV) Vaccine - COVERED BY MEDICARE PART D  * RSVPreF3 (Arexvy) CDC recommends that adults 60 years of age and older may receive a single dose of RSV vaccine using shared clinical decision-making (SCDM)   Tetanus (Td) Vaccine - COST NOT COVERED BY MEDICARE PART B Following completion of primary series, a booster dose should be given every 10 years to maintain immunity against tetanus. Td may also be given as tetanus wound prophylaxis.   Tdap Vaccine - COST NOT COVERED BY MEDICARE PART B Recommended at least once for all adults. For pregnant patients, recommended with each pregnancy.   Shingles Vaccine (Shingrix) - COST NOT COVERED BY MEDICARE PART B  2 shot series recommended in those 19 years and older who have or will have weakened immune systems or those 50 years and older     Health Maintenance Due:      Topic Date Due    Hepatitis C Screening  Completed     Immunizations Due:       Topic Date Due    Influenza Vaccine (1) 09/01/2024     Advance Directives   What are advance directives?  Advance directives are legal documents that state your wishes and plans for medical care. These plans are made ahead of time in case you lose your ability to make decisions for yourself. Advance directives can apply to any medical decision, such as the treatments you want, and if you want to donate organs.   What are the types of advance directives?  There are many types of advance directives, and each state has rules about how to use them. You may choose a combination of any of the following:  Living will:  This is a written record of the treatment you want. You can also choose which treatments you do not want, which to limit, and which to stop at a certain time. This includes surgery, medicine, IV fluid, and tube feedings.   Durable power of  for healthcare (DPAHC):  This is a written record that states who you want to make healthcare choices for you when you are unable to make them for yourself. This person, called a proxy, is usually a family member or a friend. You may choose more than 1 proxy.  Do not resuscitate (DNR) order:  A DNR order is used in case your heart stops beating or you stop breathing. It is a request not to have certain forms of treatment, such as CPR. A DNR order may be included in other types of advance directives.  Medical directive:  This covers the care that you want if you are in a coma, near death, or unable to make decisions for yourself. You can list the treatments you want for each condition. Treatment may include pain medicine, surgery, blood transfusions, dialysis, IV or tube feedings, and a ventilator (breathing machine).  Values history:  This document has questions about your views, beliefs, and how you feel and think about life. This information can help others choose the care that you would choose.  Why are advance directives important?  An advance directive  helps you control your care. Although spoken wishes may be used, it is better to have your wishes written down. Spoken wishes can be misunderstood, or not followed. Treatments may be given even if you do not want them. An advance directive may make it easier for your family to make difficult choices about your care.   Weight Management   Why it is important to manage your weight:  Being overweight increases your risk of health conditions such as heart disease, high blood pressure, type 2 diabetes, and certain types of cancer. It can also increase your risk for osteoarthritis, sleep apnea, and other respiratory problems. Aim for a slow, steady weight loss. Even a small amount of weight loss can lower your risk of health problems.  How to lose weight safely:  A safe and healthy way to lose weight is to eat fewer calories and get regular exercise. You can lose up about 1 pound a week by decreasing the number of calories you eat by 500 calories each day.   Healthy meal plan for weight management:  A healthy meal plan includes a variety of foods, contains fewer calories, and helps you stay healthy. A healthy meal plan includes the following:  Eat whole-grain foods more often.  A healthy meal plan should contain fiber. Fiber is the part of grains, fruits, and vegetables that is not broken down by your body. Whole-grain foods are healthy and provide extra fiber in your diet. Some examples of whole-grain foods are whole-wheat breads and pastas, oatmeal, brown rice, and bulgur.  Eat a variety of vegetables every day.  Include dark, leafy greens such as spinach, kale, tali greens, and mustard greens. Eat yellow and orange vegetables such as carrots, sweet potatoes, and winter squash.   Eat a variety of fruits every day.  Choose fresh or canned fruit (canned in its own juice or light syrup) instead of juice. Fruit juice has very little or no fiber.  Eat low-fat dairy foods.  Drink fat-free (skim) milk or 1% milk. Eat  fat-free yogurt and low-fat cottage cheese. Try low-fat cheeses such as mozzarella and other reduced-fat cheeses.  Choose meat and other protein foods that are low in fat.  Choose beans or other legumes such as split peas or lentils. Choose fish, skinless poultry (chicken or turkey), or lean cuts of red meat (beef or pork). Before you cook meat or poultry, cut off any visible fat.   Use less fat and oil.  Try baking foods instead of frying them. Add less fat, such as margarine, sour cream, regular salad dressing and mayonnaise to foods. Eat fewer high-fat foods. Some examples of high-fat foods include french fries, doughnuts, ice cream, and cakes.  Eat fewer sweets.  Limit foods and drinks that are high in sugar. This includes candy, cookies, regular soda, and sweetened drinks.  Exercise:  Exercise at least 30 minutes per day on most days of the week. Some examples of exercise include walking, biking, dancing, and swimming. You can also fit in more physical activity by taking the stairs instead of the elevator or parking farther away from stores. Ask your healthcare provider about the best exercise plan for you.      © Copyright [employer] 2018 Information is for End User's use only and may not be sold, redistributed or otherwise used for commercial purposes. All illustrations and images included in CareNotes® are the copyrighted property of [employer] or [employer]       .

## 2024-07-22 NOTE — PROGRESS NOTES
Chief Complaint   Patient presents with    Medicare Wellness Visit        HPI   Here for follow-up of hypertension, hyperlipidemia, prediabetes, and stage III chronic kidney disease.  And Medicare wellness exam.  Anticipates her grandsons wedding in mid September.  Will fly to [place] where her son lives for 5 days, then fly to [place] and take a ferry to the [place].  Then fly back to [facility] from [place] all by herself at almost 84 years old.  Zac doesn't fly.   Inquires about mammogram which was last done in 2023.  Has been going yearly.  Mother  from breast cancer.  At 55.  Also was sent for ultrasound of her kidneys 3 times.  The last study was no different from the previous 2.      Past Medical History:   Diagnosis Date    Arthritis     Chronic problem    Diverticulosis 2018    Family history of breast cancer in mother 2023    GERD with esophagitis 2018    Glaucoma 2018    Hearing loss 2018    Wears bilateral hearing aids    Hyperlipidemia 2018    Hypertension     Prediabetes 2023    Stage 3a chronic kidney disease (HCC) 2023        Past Surgical History:   Procedure Laterality Date    ABDOMINAL AORTIC ANEURYSM REPAIR Right 2007    Femoral artery-Dr. Herrera    TUBAL LIGATION Bilateral        Social History     Tobacco Use    Smoking status: Former     Current packs/day: 0.00     Average packs/day: 0.3 packs/day for 15.0 years (3.8 ttl pk-yrs)     Types: Cigarettes     Start date: 10/20/1988     Quit date: 10/20/2003     Years since quittin.7    Smokeless tobacco: Never    Tobacco comments:     I did quit   Substance Use Topics    Alcohol use: Yes     Alcohol/week: 0.0 standard drinks of alcohol     Comment: I have a beer or mixed drink when in a restaurant       Social History     Social History Narrative     since .    Lives alone.  Has a boyfriend for 20 years.  He has his own place.Zac.    1 child.  2 grandchildren.  One  "great grandchild.  In [facility].    Enjoys reading, going out for dinner.    Had worked at [employer].    Enjoys the [employer] channel.        The following portions of the patient's history were reviewed and updated as appropriate: allergies, current medications, past family history, past medical history, past social history, past surgical history, and problem list.      Review of Systems       /67 (BP Location: Left arm, Patient Position: Sitting, Cuff Size: Adult)   Pulse 71   Temp (!) 97.1 °F (36.2 °C) (Temporal)   Ht 5' 6\" (1.676 m)   Wt 75.4 kg (166 lb 3.2 oz)   SpO2 94%   BMI 26.83 kg/m²      Physical Exam   Appears well.  Lungs are clear.  Heart regular with soft systolic murmur.  Mood is upbeat.  Affect appropriate.  No edema.              Current Outpatient Medications:     Acetaminophen (TYLENOL ARTHRITIS EXT RELIEF PO), Take by mouth, Disp: , Rfl:     ASPERCREME LIDOCAINE EX, Apply topically, Disp: , Rfl:     bimatoprost (LUMIGAN) 0.01 % ophthalmic drops, Apply 1 drop to eye Daily, Disp: , Rfl:     brimonidine (ALPHAGAN P) 0.1 %, Apply 1 drop to eye every 12 (twelve) hours, Disp: , Rfl:     Calcium Carbonate-Vitamin D (CALTRATE 600+D PO), Take 2 tablets by mouth daily , Disp: , Rfl:     cholecalciferol (VITAMIN D3) 1,000 units tablet, Take 1,000 Units by mouth daily, Disp: , Rfl:     diltiazem (CARDIZEM CD) 240 mg 24 hr capsule, Take 1 capsule (240 mg total) by mouth daily, Disp: 90 capsule, Rfl: 3    ezetimibe (ZETIA) 10 mg tablet, Take 1 tablet (10 mg total) by mouth daily, Disp: 90 tablet, Rfl: 3    ipratropium (ATROVENT) 0.06 % nasal spray, 2 sprays into each nostril 4 (four) times a day, Disp: 15 mL, Rfl: 5    Multiple Vitamins-Minerals (CENTRUM SILVER ULTRA WOMENS PO), Take 1 tablet by mouth daily, Disp: , Rfl:     olmesartan (Benicar) 20 mg tablet, Take 1 tablet (20 mg total) by mouth daily, Disp: 90 tablet, Rfl: 3    rosuvastatin (CRESTOR) 10 MG tablet, Take 1 tablet (10 mg " total) by mouth daily, Disp: 90 tablet, Rfl: 3     No problem-specific Assessment & Plan notes found for this encounter.       Diagnoses and all orders for this visit:    Medicare annual wellness visit, subsequent    Essential hypertension    Stage 3a chronic kidney disease (HCC)    Pure hypercholesterolemia        Patient Instructions   Medicare Preventive Visit Patient Instructions  Thank you for completing your Welcome to Medicare Visit or Medicare Annual Wellness Visit today. Your next wellness visit will be due in one year (7/23/2025).  The screening/preventive services that you may require over the next 5-10 years are detailed below. Some tests may not apply to you based off risk factors and/or age. Screening tests ordered at today's visit but not completed yet may show as past due. Also, please note that scanned in results may not display below.  Preventive Screenings:  Service Recommendations Previous Testing/Comments   Colorectal Cancer Screening  * Colonoscopy    * Fecal Occult Blood Test (FOBT)/Fecal Immunochemical Test (FIT)  * Fecal DNA/Cologuard Test  * Flexible Sigmoidoscopy Age: 45-75 years old   Colonoscopy: every 10 years (may be performed more frequently if at higher risk)  OR  FOBT/FIT: every 1 year  OR  Cologuard: every 3 years  OR  Sigmoidoscopy: every 5 years  Screening may be recommended earlier than age 45 if at higher risk for colorectal cancer. Also, an individualized decision between you and your healthcare provider will decide whether screening between the ages of 76-85 would be appropriate. Colonoscopy: Not on file  FOBT/FIT: Not on file  Cologuard: Not on file  Sigmoidoscopy: Not on file          Breast Cancer Screening Age: 40+ years old  Frequency: every 1-2 years  Not required if history of left and right mastectomy Mammogram: 06/21/2023        Cervical Cancer Screening Between the ages of 21-29, pap smear recommended once every 3 years.   Between the ages of 30-65, can perform pap  smear with HPV co-testing every 5 years.   Recommendations may differ for women with a history of total hysterectomy, cervical cancer, or abnormal pap smears in past. Pap Smear: Not on file        Hepatitis C Screening Once for adults born between 1945 and 1965  More frequently in patients at high risk for Hepatitis C Hep C Antibody: 07/09/2020        Diabetes Screening 1-2 times per year if you're at risk for diabetes or have pre-diabetes Fasting glucose: No results in last 5 years (No results in last 5 years)  A1C: 6.2 (10/23/2023)      Cholesterol Screening Once every 5 years if you don't have a lipid disorder. May order more often based on risk factors. Lipid panel: 01/23/2024          Other Preventive Screenings Covered by Medicare:  Abdominal Aortic Aneurysm (AAA) Screening: covered once if your at risk. You're considered to be at risk if you have a family history of AAA.  Lung Cancer Screening: covers low dose CT scan once per year if you meet all of the following conditions: (1) Age 55-77; (2) No signs or symptoms of lung cancer; (3) Current smoker or have quit smoking within the last 15 years; (4) You have a tobacco smoking history of at least 20 pack years (packs per day multiplied by number of years you smoked); (5) You get a written order from a healthcare provider.  Glaucoma Screening: covered annually if you're considered high risk: (1) You have diabetes OR (2) Family history of glaucoma OR (3)  aged 50 and older OR (4)  American aged 65 and older  Osteoporosis Screening: covered every 2 years if you meet one of the following conditions: (1) You're estrogen deficient and at risk for osteoporosis based off medical history and other findings; (2) Have a vertebral abnormality; (3) On glucocorticoid therapy for more than 3 months; (4) Have primary hyperparathyroidism; (5) On osteoporosis medications and need to assess response to drug therapy.   Last bone density test (DXA Scan):  07/29/2020.  HIV Screening: covered annually if you're between the age of 15-65. Also covered annually if you are younger than 15 and older than 65 with risk factors for HIV infection. For pregnant patients, it is covered up to 3 times per pregnancy.    Immunizations:  Immunization Recommendations   Influenza Vaccine Annual influenza vaccination during flu season is recommended for all persons aged >= 6 months who do not have contraindications   Pneumococcal Vaccine   * Pneumococcal conjugate vaccine = PCV13 (Prevnar 13), PCV15 (Vaxneuvance), PCV20 (Prevnar 20)  * Pneumococcal polysaccharide vaccine = PPSV23 (Pneumovax) Adults 19-65 yo with certain risk factors or if 65+ yo  If never received any pneumonia vaccine: recommend Prevnar 20 (PCV20)  Give PCV20 if previously received 1 dose of PCV13 or PPSV23   Hepatitis B Vaccine 3 dose series if at intermediate or high risk (ex: diabetes, end stage renal disease, liver disease)   Respiratory syncytial virus (RSV) Vaccine - COVERED BY MEDICARE PART D  * RSVPreF3 (Arexvy) CDC recommends that adults 60 years of age and older may receive a single dose of RSV vaccine using shared clinical decision-making (SCDM)   Tetanus (Td) Vaccine - COST NOT COVERED BY MEDICARE PART B Following completion of primary series, a booster dose should be given every 10 years to maintain immunity against tetanus. Td may also be given as tetanus wound prophylaxis.   Tdap Vaccine - COST NOT COVERED BY MEDICARE PART B Recommended at least once for all adults. For pregnant patients, recommended with each pregnancy.   Shingles Vaccine (Shingrix) - COST NOT COVERED BY MEDICARE PART B  2 shot series recommended in those 19 years and older who have or will have weakened immune systems or those 50 years and older     Health Maintenance Due:      Topic Date Due    Hepatitis C Screening  Completed     Immunizations Due:      Topic Date Due    Influenza Vaccine (1) 09/01/2024     Advance Directives   What  are advance directives?  Advance directives are legal documents that state your wishes and plans for medical care. These plans are made ahead of time in case you lose your ability to make decisions for yourself. Advance directives can apply to any medical decision, such as the treatments you want, and if you want to donate organs.   What are the types of advance directives?  There are many types of advance directives, and each state has rules about how to use them. You may choose a combination of any of the following:  Living will:  This is a written record of the treatment you want. You can also choose which treatments you do not want, which to limit, and which to stop at a certain time. This includes surgery, medicine, IV fluid, and tube feedings.   Durable power of  for healthcare (DPAHC):  This is a written record that states who you want to make healthcare choices for you when you are unable to make them for yourself. This person, called a proxy, is usually a family member or a friend. You may choose more than 1 proxy.  Do not resuscitate (DNR) order:  A DNR order is used in case your heart stops beating or you stop breathing. It is a request not to have certain forms of treatment, such as CPR. A DNR order may be included in other types of advance directives.  Medical directive:  This covers the care that you want if you are in a coma, near death, or unable to make decisions for yourself. You can list the treatments you want for each condition. Treatment may include pain medicine, surgery, blood transfusions, dialysis, IV or tube feedings, and a ventilator (breathing machine).  Values history:  This document has questions about your views, beliefs, and how you feel and think about life. This information can help others choose the care that you would choose.  Why are advance directives important?  An advance directive helps you control your care. Although spoken wishes may be used, it is better to have  your wishes written down. Spoken wishes can be misunderstood, or not followed. Treatments may be given even if you do not want them. An advance directive may make it easier for your family to make difficult choices about your care.   Weight Management   Why it is important to manage your weight:  Being overweight increases your risk of health conditions such as heart disease, high blood pressure, type 2 diabetes, and certain types of cancer. It can also increase your risk for osteoarthritis, sleep apnea, and other respiratory problems. Aim for a slow, steady weight loss. Even a small amount of weight loss can lower your risk of health problems.  How to lose weight safely:  A safe and healthy way to lose weight is to eat fewer calories and get regular exercise. You can lose up about 1 pound a week by decreasing the number of calories you eat by 500 calories each day.   Healthy meal plan for weight management:  A healthy meal plan includes a variety of foods, contains fewer calories, and helps you stay healthy. A healthy meal plan includes the following:  Eat whole-grain foods more often.  A healthy meal plan should contain fiber. Fiber is the part of grains, fruits, and vegetables that is not broken down by your body. Whole-grain foods are healthy and provide extra fiber in your diet. Some examples of whole-grain foods are whole-wheat breads and pastas, oatmeal, brown rice, and bulgur.  Eat a variety of vegetables every day.  Include dark, leafy greens such as spinach, kale, tali greens, and mustard greens. Eat yellow and orange vegetables such as carrots, sweet potatoes, and winter squash.   Eat a variety of fruits every day.  Choose fresh or canned fruit (canned in its own juice or light syrup) instead of juice. Fruit juice has very little or no fiber.  Eat low-fat dairy foods.  Drink fat-free (skim) milk or 1% milk. Eat fat-free yogurt and low-fat cottage cheese. Try low-fat cheeses such as mozzarella and other  reduced-fat cheeses.  Choose meat and other protein foods that are low in fat.  Choose beans or other legumes such as split peas or lentils. Choose fish, skinless poultry (chicken or turkey), or lean cuts of red meat (beef or pork). Before you cook meat or poultry, cut off any visible fat.   Use less fat and oil.  Try baking foods instead of frying them. Add less fat, such as margarine, sour cream, regular salad dressing and mayonnaise to foods. Eat fewer high-fat foods. Some examples of high-fat foods include french fries, doughnuts, ice cream, and cakes.  Eat fewer sweets.  Limit foods and drinks that are high in sugar. This includes candy, cookies, regular soda, and sweetened drinks.  Exercise:  Exercise at least 30 minutes per day on most days of the week. Some examples of exercise include walking, biking, dancing, and swimming. You can also fit in more physical activity by taking the stairs instead of the elevator or parking farther away from stores. Ask your healthcare provider about the best exercise plan for you.      © Copyright [employer] 2018 Information is for End User's use only and may not be sold, redistributed or otherwise used for commercial purposes. All illustrations and images included in CareNotes® are the copyrighted property of A.D.A.M., Inc. or [employer]

## 2024-10-21 ENCOUNTER — OFFICE VISIT (OUTPATIENT)
Dept: FAMILY MEDICINE CLINIC | Facility: CLINIC | Age: 84
End: 2024-10-21
Payer: MEDICARE

## 2024-10-21 VITALS
WEIGHT: 165.4 LBS | DIASTOLIC BLOOD PRESSURE: 62 MMHG | TEMPERATURE: 97.7 F | HEART RATE: 86 BPM | OXYGEN SATURATION: 96 % | HEIGHT: 66 IN | BODY MASS INDEX: 26.58 KG/M2 | SYSTOLIC BLOOD PRESSURE: 118 MMHG

## 2024-10-21 DIAGNOSIS — I10 ESSENTIAL HYPERTENSION: ICD-10-CM

## 2024-10-21 DIAGNOSIS — R00.2 PALPITATIONS: Primary | ICD-10-CM

## 2024-10-21 PROCEDURE — 99214 OFFICE O/P EST MOD 30 MIN: CPT | Performed by: FAMILY MEDICINE

## 2024-10-21 PROCEDURE — G2211 COMPLEX E/M VISIT ADD ON: HCPCS | Performed by: FAMILY MEDICINE

## 2024-10-21 NOTE — PATIENT INSTRUCTIONS
Advised that palpitations are benign.  Her cardiogram is normal.  If she can tolerate the extra beats, we will just observe for now.  If they are not tolerable, we will switch from Cardizem to a medication in a different class to try to decrease the frequency of the extra beats.  Return as scheduled on November 18.

## 2024-10-21 NOTE — PROGRESS NOTES
Chief Complaint   Patient presents with    Palpitations     Feels flutter and feels gassy with them and is burping.  She's been feeling these for about 2 months        HPI   Here with palpitations that have been present for the last 2 months.  Not present all the time.  Usually occur in the afternoon.  Was not so bad on her trip to [place] in [facility] which she did go on her own.  Enjoyed her grandsons wedding.  Yesterday, the palpitations were more bothersome.  She feels them in her throat or neck.  No associated chest discomfort.  Continues on her usual medication.  Notes some indigestion as well.  Which occur with the palpitations.  Gets some relief with 20 mg famotidine which she rarely takes.  She does have a wonderful log of her blood pressure readings at home which are well-controlled.  Usually 1 20-1 30s systolic.  One time too low after she did the Alphagan eyedrops.  Notes that after talking to her son in [place] about face timing later in the day or week, she became upset and that causes more palpitations.  She has difficulty seeing the people on her tiny phone and unable to read close captions so she cannot quite understand what they are saying to her.      Past Medical History:   Diagnosis Date    Arthritis 1995    Chronic problem    Diverticulosis 07/24/2018    Family history of breast cancer in mother 2/27/2023    GERD with esophagitis 07/24/2018    Glaucoma 7/24/2018    Hearing loss 07/24/2018    Wears bilateral hearing aids    Hyperlipidemia 07/24/2018    Hypertension     Prediabetes 6/26/2023    Stage 3a chronic kidney disease (HCC) 02/27/2023        Past Surgical History:   Procedure Laterality Date    ABDOMINAL AORTIC ANEURYSM REPAIR Right 2007    Femoral artery-Dr. Herrera    TUBAL LIGATION Bilateral        Social History     Tobacco Use    Smoking status: Former     Current packs/day: 0.00     Average packs/day: 0.3 packs/day for 15.0 years (3.8 ttl pk-yrs)     Types: Cigarettes     Start  "date: 10/20/1988     Quit date: 10/20/2003     Years since quittin.0    Smokeless tobacco: Never    Tobacco comments:     I did quit   Substance Use Topics    Alcohol use: Yes     Alcohol/week: 0.0 standard drinks of alcohol     Comment: I have a beer or mixed drink when in a restaurant       Social History     Social History Narrative     since .    Lives alone.  Has a boyfriend for 20 years.  He has his own place. Zac is 11 years younger.    1 child.  2 grandchildren.  One great grandchild.  In [facility].    Enjoys reading, going out for dinner.    Had worked at [employer].    Enjoys the [employer] channel.        The following portions of the patient's history were reviewed and updated as appropriate: allergies, current medications, past family history, past medical history, past social history, past surgical history, and problem list.      Review of Systems       /62 Comment: at home  Pulse 86   Temp 97.7 °F (36.5 °C) (Temporal)   Ht 5' 6\" (1.676 m)   Wt 75 kg (165 lb 6.4 oz)   SpO2 96%   BMI 26.70 kg/m²      Physical Exam   Repeat blood pressure 150/80.  Lungs are clear.  Heart reveals occasional skipped beats which are unifocal.  EKG is normal.  No ectopy noted.              Current Outpatient Medications:     Acetaminophen (TYLENOL ARTHRITIS EXT RELIEF PO), Take by mouth, Disp: , Rfl:     ASPERCREME LIDOCAINE EX, Apply topically, Disp: , Rfl:     bimatoprost (LUMIGAN) 0.01 % ophthalmic drops, Apply 1 drop to eye Daily, Disp: , Rfl:     brimonidine (ALPHAGAN P) 0.1 %, Apply 1 drop to eye every 12 (twelve) hours, Disp: , Rfl:     Calcium Carbonate-Vitamin D (CALTRATE 600+D PO), Take 2 tablets by mouth daily , Disp: , Rfl:     cholecalciferol (VITAMIN D3) 1,000 units tablet, Take 1,000 Units by mouth daily, Disp: , Rfl:     diltiazem (CARDIZEM CD) 240 mg 24 hr capsule, Take 1 capsule (240 mg total) by mouth daily, Disp: 90 capsule, Rfl: 3    ezetimibe (ZETIA) 10 mg tablet, " Take 1 tablet (10 mg total) by mouth daily, Disp: 90 tablet, Rfl: 3    ipratropium (ATROVENT) 0.06 % nasal spray, 2 sprays into each nostril 4 (four) times a day, Disp: 15 mL, Rfl: 5    Multiple Vitamins-Minerals (CENTRUM SILVER ULTRA WOMENS PO), Take 1 tablet by mouth daily, Disp: , Rfl:     olmesartan (Benicar) 20 mg tablet, Take 1 tablet (20 mg total) by mouth daily, Disp: 90 tablet, Rfl: 3    rosuvastatin (CRESTOR) 10 MG tablet, Take 1 tablet (10 mg total) by mouth daily, Disp: 90 tablet, Rfl: 3     No problem-specific Assessment & Plan notes found for this encounter.       Diagnoses and all orders for this visit:    Palpitations    Essential hypertension        Patient Instructions   Advised that palpitations are benign.  Her cardiogram is normal.  If she can tolerate the extra beats, we will just observe for now.  If they are not tolerable, we will switch from Cardizem to a medication in a different class to try to decrease the frequency of the extra beats.  Return as scheduled on November 18.

## 2024-11-18 ENCOUNTER — OFFICE VISIT (OUTPATIENT)
Dept: FAMILY MEDICINE CLINIC | Facility: CLINIC | Age: 84
End: 2024-11-18
Payer: MEDICARE

## 2024-11-18 VITALS
SYSTOLIC BLOOD PRESSURE: 120 MMHG | WEIGHT: 167.4 LBS | DIASTOLIC BLOOD PRESSURE: 68 MMHG | OXYGEN SATURATION: 95 % | HEIGHT: 66 IN | BODY MASS INDEX: 26.9 KG/M2 | HEART RATE: 77 BPM

## 2024-11-18 DIAGNOSIS — I10 ESSENTIAL HYPERTENSION: Primary | ICD-10-CM

## 2024-11-18 DIAGNOSIS — R00.2 PALPITATIONS: ICD-10-CM

## 2024-11-18 DIAGNOSIS — R73.03 PREDIABETES: ICD-10-CM

## 2024-11-18 DIAGNOSIS — N18.31 STAGE 3A CHRONIC KIDNEY DISEASE (HCC): ICD-10-CM

## 2024-11-18 DIAGNOSIS — Z23 NEEDS FLU SHOT: ICD-10-CM

## 2024-11-18 DIAGNOSIS — E78.00 PURE HYPERCHOLESTEROLEMIA: ICD-10-CM

## 2024-11-18 PROBLEM — K76.0 FATTY LIVER: Status: RESOLVED | Noted: 2018-07-24 | Resolved: 2024-11-18

## 2024-11-18 PROBLEM — E11.51 TYPE II DIABETES MELLITUS WITH PERIPHERAL CIRCULATORY DISORDER (HCC): Status: ACTIVE | Noted: 2024-11-18

## 2024-11-18 PROBLEM — E11.51 TYPE II DIABETES MELLITUS WITH PERIPHERAL CIRCULATORY DISORDER (HCC): Status: RESOLVED | Noted: 2024-11-18 | Resolved: 2024-11-18

## 2024-11-18 PROCEDURE — G0008 ADMIN INFLUENZA VIRUS VAC: HCPCS | Performed by: FAMILY MEDICINE

## 2024-11-18 PROCEDURE — 99214 OFFICE O/P EST MOD 30 MIN: CPT | Performed by: FAMILY MEDICINE

## 2024-11-18 PROCEDURE — 90662 IIV NO PRSV INCREASED AG IM: CPT | Performed by: FAMILY MEDICINE

## 2024-11-18 RX ORDER — DILTIAZEM HYDROCHLORIDE 240 MG/1
240 CAPSULE, COATED, EXTENDED RELEASE ORAL DAILY
Qty: 90 CAPSULE | Refills: 3 | Status: SHIPPED | OUTPATIENT
Start: 2024-11-18 | End: 2024-11-18

## 2024-11-18 RX ORDER — DILTIAZEM HYDROCHLORIDE 240 MG/1
240 CAPSULE, COATED, EXTENDED RELEASE ORAL DAILY
Qty: 90 CAPSULE | Refills: 3 | Status: SHIPPED | OUTPATIENT
Start: 2024-11-18 | End: 2024-11-20 | Stop reason: SDUPTHER

## 2024-11-18 RX ORDER — ROSUVASTATIN CALCIUM 10 MG/1
10 TABLET, COATED ORAL DAILY
Qty: 90 TABLET | Refills: 3 | Status: SHIPPED | OUTPATIENT
Start: 2024-11-18

## 2024-11-18 RX ORDER — OLMESARTAN MEDOXOMIL 20 MG/1
20 TABLET ORAL DAILY
Qty: 90 TABLET | Refills: 3 | Status: SHIPPED | OUTPATIENT
Start: 2024-11-18

## 2024-11-18 RX ORDER — EZETIMIBE 10 MG/1
10 TABLET ORAL DAILY
Qty: 90 TABLET | Refills: 3 | Status: SHIPPED | OUTPATIENT
Start: 2024-11-18

## 2024-11-18 NOTE — PROGRESS NOTES
Chief Complaint   Patient presents with    Follow-up     Pt would like flu shot         HPI   Here for follow-up of hypertension, hyperlipidemia, prediabetes, and stage III chronic kidney disease.  And palpitations.  States that the palpitations have not been too bad.  Sometimes present in the morning, especially laying on the left side.  She does her exercise and they go away.      Past Medical History:   Diagnosis Date    Arthritis     Chronic problem    Diverticulosis 2018    Family history of breast cancer in mother 2023    GERD with esophagitis 2018    Glaucoma 2018    Hearing loss 2018    Wears bilateral hearing aids    Hyperlipidemia 2018    Hypertension     Prediabetes 2023    Stage 3a chronic kidney disease (HCC) 2023        Past Surgical History:   Procedure Laterality Date    ABDOMINAL AORTIC ANEURYSM REPAIR Right 2007    Femoral artery-Dr. Herrera    TUBAL LIGATION Bilateral        Social History     Tobacco Use    Smoking status: Former     Current packs/day: 0.00     Average packs/day: 0.3 packs/day for 15.0 years (3.8 ttl pk-yrs)     Types: Cigarettes     Start date: 10/20/1988     Quit date: 10/20/2003     Years since quittin.0    Smokeless tobacco: Never    Tobacco comments:     I did quit   Substance Use Topics    Alcohol use: Yes     Alcohol/week: 0.0 standard drinks of alcohol     Comment: I have a beer or mixed drink when in a restaurant       Social History     Social History Narrative     since .    Lives alone.  Has a boyfriend for 20 years.  He has his own place. Zac is 11 years younger.    1 child.  2 grandchildren.  One great grandchild.  In [facility].    Enjoys reading, going out for dinner.    Had worked at [employer].    Enjoys the [employer] channel.        The following portions of the patient's history were reviewed and updated as appropriate: allergies, current medications, past family history, past medical  "history, past social history, past surgical history, and problem list.      Review of Systems       /68 (BP Location: Right arm, Patient Position: Sitting, Cuff Size: Adult)   Pulse 77   Ht 5' 6\" (1.676 m)   Wt 75.9 kg (167 lb 6.4 oz)   SpO2 95%   BMI 27.02 kg/m²      Physical Exam   Appears well.  Heart regular with a 2/6 systolic murmur.  No ectopic beats heard.  Lungs clear.  No edema.  Mood is upbeat.  Affect appropriate.              Current Outpatient Medications:     Acetaminophen (TYLENOL ARTHRITIS EXT RELIEF PO), Take by mouth, Disp: , Rfl:     ASPERCREME LIDOCAINE EX, Apply topically, Disp: , Rfl:     bimatoprost (LUMIGAN) 0.01 % ophthalmic drops, Apply 1 drop to eye Daily, Disp: , Rfl:     brimonidine (ALPHAGAN P) 0.1 %, Apply 1 drop to eye every 12 (twelve) hours, Disp: , Rfl:     Calcium Carbonate-Vitamin D (CALTRATE 600+D PO), Take 2 tablets by mouth daily , Disp: , Rfl:     cholecalciferol (VITAMIN D3) 1,000 units tablet, Take 1,000 Units by mouth daily, Disp: , Rfl:     diltiazem (CARDIZEM CD) 240 mg 24 hr capsule, Take 1 capsule (240 mg total) by mouth daily, Disp: 90 capsule, Rfl: 3    ezetimibe (ZETIA) 10 mg tablet, Take 1 tablet (10 mg total) by mouth daily, Disp: 90 tablet, Rfl: 3    ipratropium (ATROVENT) 0.06 % nasal spray, 2 sprays into each nostril 4 (four) times a day, Disp: 15 mL, Rfl: 5    Multiple Vitamins-Minerals (CENTRUM SILVER ULTRA WOMENS PO), Take 1 tablet by mouth daily, Disp: , Rfl:     olmesartan (Benicar) 20 mg tablet, Take 1 tablet (20 mg total) by mouth daily, Disp: 90 tablet, Rfl: 3    rosuvastatin (CRESTOR) 10 MG tablet, Take 1 tablet (10 mg total) by mouth daily, Disp: 90 tablet, Rfl: 3     No problem-specific Assessment & Plan notes found for this encounter.       Diagnoses and all orders for this visit:    Essential hypertension  -     Basic metabolic panel; Future  -     Basic metabolic panel    Pure hypercholesterolemia  -     Lipid panel; Future  -     " Lipid panel    Palpitations  -     TSH, 3rd generation with Free T4 reflex; Future  -     TSH, 3rd generation with Free T4 reflex    Stage 3a chronic kidney disease (HCC)    Needs flu shot  -     influenza vaccine, high-dose, PF 0.5 mL (Fluzone High Dose)    Type II diabetes mellitus with peripheral circulatory disorder (HCC)    Prediabetes        Patient Instructions   Observation of palpitations which are not bothersome and improved from last visit.  Blood pressure is well-controlled.  At the end of the year or beginning of January, check blood work to include kidney function, A1c for prediabetes, and lipid profile.  Medications stay the same.  Flu shot.  Declines COVID booster.  Recheck in 4 months.

## 2024-11-18 NOTE — PATIENT INSTRUCTIONS
Observation of palpitations which are not bothersome and improved from last visit.  Blood pressure is well-controlled.  At the end of the year or beginning of January, check blood work to include kidney function, A1c for prediabetes, and lipid profile.  Medications stay the same.  Flu shot.  Declines COVID booster.  Recheck in 4 months.

## 2024-11-19 ENCOUNTER — TELEPHONE (OUTPATIENT)
Age: 84
End: 2024-11-19

## 2024-11-19 NOTE — TELEPHONE ENCOUNTER
Pt called in regards to script for Cardizem, she stated [facility] is showing [facility] is [facility]. Reviewed chart and advised pt looks like script was canceled and then resent again. She will follow up [facility] tomorrow and call back if further assistance is needed.

## 2024-11-20 DIAGNOSIS — I10 ESSENTIAL HYPERTENSION: ICD-10-CM

## 2024-11-20 DIAGNOSIS — E78.00 PURE HYPERCHOLESTEROLEMIA: ICD-10-CM

## 2024-11-20 RX ORDER — DILTIAZEM HYDROCHLORIDE 240 MG/1
240 CAPSULE, COATED, EXTENDED RELEASE ORAL DAILY
Qty: 90 CAPSULE | Refills: 1 | Status: SHIPPED | OUTPATIENT
Start: 2024-11-20

## 2024-11-20 NOTE — TELEPHONE ENCOUNTER
Reason for call: pt calling stating that [facility] does not have this new prescription. They are requesting it to be resent   [x] Refill   [] Prior Auth  [] Other:     Office:   [x] PCP/Provider -   [] Specialty/Provider -     Medication: diltiazem (CARDIZEM CD) 240 mg 24 hr capsule     Dose/Frequency:  Take 1 capsule (240 mg total) by mouth daily     Quantity: 90 capsule     Pharmacy:  [facility] - [address]     Does the patient have enough for 3 days?   [] Yes   [x] No - Send as HP to POD

## 2025-03-11 ENCOUNTER — APPOINTMENT (OUTPATIENT)
Dept: LAB | Age: 85
End: 2025-03-11
Payer: MEDICARE

## 2025-03-11 LAB
ALBUMIN SERPL BCG-MCNC: 4.6 G/DL (ref 3.5–5)
ALP SERPL-CCNC: 64 U/L (ref 34–104)
ALT SERPL W P-5'-P-CCNC: 15 U/L (ref 7–52)
ANION GAP SERPL CALCULATED.3IONS-SCNC: 9 MMOL/L (ref 4–13)
AST SERPL W P-5'-P-CCNC: 19 U/L (ref 13–39)
BILIRUB SERPL-MCNC: 0.42 MG/DL (ref 0.2–1)
BUN SERPL-MCNC: 29 MG/DL (ref 5–25)
CALCIUM SERPL-MCNC: 9.7 MG/DL (ref 8.4–10.2)
CHLORIDE SERPL-SCNC: 103 MMOL/L (ref 96–108)
CHOLEST SERPL-MCNC: 146 MG/DL (ref ?–200)
CO2 SERPL-SCNC: 26 MMOL/L (ref 21–32)
CREAT SERPL-MCNC: 1.09 MG/DL (ref 0.6–1.3)
EST. AVERAGE GLUCOSE BLD GHB EST-MCNC: 131 MG/DL
GFR SERPL CREATININE-BSD FRML MDRD: 47 ML/MIN/1.73SQ M
GLUCOSE P FAST SERPL-MCNC: 106 MG/DL (ref 65–99)
HBA1C MFR BLD: 6.2 %
HDLC SERPL-MCNC: 68 MG/DL
LDLC SERPL CALC-MCNC: 60 MG/DL (ref 0–100)
NONHDLC SERPL-MCNC: 78 MG/DL
POTASSIUM SERPL-SCNC: 4.8 MMOL/L (ref 3.5–5.3)
PROT SERPL-MCNC: 7 G/DL (ref 6.4–8.4)
SODIUM SERPL-SCNC: 138 MMOL/L (ref 135–147)
T4 FREE SERPL-MCNC: 0.77 NG/DL (ref 0.61–1.12)
TRIGL SERPL-MCNC: 88 MG/DL (ref ?–150)
TSH SERPL DL<=0.05 MIU/L-ACNC: 6.95 UIU/ML (ref 0.45–4.5)

## 2025-03-12 ENCOUNTER — RESULTS FOLLOW-UP (OUTPATIENT)
Dept: FAMILY MEDICINE CLINIC | Facility: CLINIC | Age: 85
End: 2025-03-12

## 2025-03-12 NOTE — RESULT ENCOUNTER NOTE
A1c stable in the prediabetic range.  Chemistry is stable.  Thyroid is borderline.  Cholesterol is excellent.

## 2025-03-20 ENCOUNTER — RA CDI HCC (OUTPATIENT)
Dept: OTHER | Facility: HOSPITAL | Age: 85
End: 2025-03-20

## 2025-03-27 ENCOUNTER — OFFICE VISIT (OUTPATIENT)
Dept: FAMILY MEDICINE CLINIC | Facility: CLINIC | Age: 85
End: 2025-03-27
Payer: MEDICARE

## 2025-03-27 VITALS
BODY MASS INDEX: 26.61 KG/M2 | WEIGHT: 165.6 LBS | OXYGEN SATURATION: 97 % | HEIGHT: 66 IN | HEART RATE: 84 BPM | SYSTOLIC BLOOD PRESSURE: 132 MMHG | TEMPERATURE: 97.9 F | DIASTOLIC BLOOD PRESSURE: 72 MMHG

## 2025-03-27 DIAGNOSIS — E03.9 ACQUIRED HYPOTHYROIDISM: Primary | ICD-10-CM

## 2025-03-27 DIAGNOSIS — I10 ESSENTIAL HYPERTENSION: ICD-10-CM

## 2025-03-27 DIAGNOSIS — N18.31 STAGE 3A CHRONIC KIDNEY DISEASE (HCC): ICD-10-CM

## 2025-03-27 DIAGNOSIS — E78.00 PURE HYPERCHOLESTEROLEMIA: ICD-10-CM

## 2025-03-27 DIAGNOSIS — H90.3 SENSORINEURAL HEARING LOSS (SNHL) OF BOTH EARS: ICD-10-CM

## 2025-03-27 DIAGNOSIS — R00.2 PALPITATIONS: ICD-10-CM

## 2025-03-27 DIAGNOSIS — R73.03 PREDIABETES: ICD-10-CM

## 2025-03-27 PROCEDURE — G2211 COMPLEX E/M VISIT ADD ON: HCPCS | Performed by: FAMILY MEDICINE

## 2025-03-27 PROCEDURE — 99214 OFFICE O/P EST MOD 30 MIN: CPT | Performed by: FAMILY MEDICINE

## 2025-03-27 RX ORDER — LEVOTHYROXINE SODIUM 25 UG/1
25 TABLET ORAL
Qty: 100 TABLET | Refills: 3 | Status: SHIPPED | OUTPATIENT
Start: 2025-03-27

## 2025-03-27 RX ORDER — DILTIAZEM HYDROCHLORIDE 240 MG/1
240 CAPSULE, COATED, EXTENDED RELEASE ORAL DAILY
Qty: 90 CAPSULE | Refills: 3 | Status: SHIPPED | OUTPATIENT
Start: 2025-03-27

## 2025-03-27 NOTE — ASSESSMENT & PLAN NOTE
Orders:  •  diltiazem (CARDIZEM CD) 240 mg 24 hr capsule; Take 1 capsule (240 mg total) by mouth daily

## 2025-03-27 NOTE — PROGRESS NOTES
Name: Cary Laws      : 1940      MRN: [id number]  Encounter Provider: Cuba Silver MD  Encounter Date: 3/27/2025   Encounter department: [facility]    Assessment & Plan  Acquired hypothyroidism    Orders:  •  levothyroxine 25 mcg tablet; Take 1 tablet (25 mcg total) by mouth daily in the early morning  •  TSH, 3rd generation; Future  •  T4, free; Future    Essential hypertension    Orders:  •  diltiazem (CARDIZEM CD) 240 mg 24 hr capsule; Take 1 capsule (240 mg total) by mouth daily    Pure hypercholesterolemia         Sensorineural hearing loss (SNHL) of both ears         Palpitations         Prediabetes         Stage 3a chronic kidney disease (HCC)  Lab Results   Component Value Date    EGFR 47 2025    EGFR 61 2024    EGFR 51 (L) 10/25/2022    CREATININE 1.09 2025    CREATININE 0.93 2024    CREATININE 1.09 10/25/2022            Essential hypertension    Orders:  •  diltiazem (CARDIZEM CD) 240 mg 24 hr capsule; Take 1 capsule (240 mg total) by mouth daily       Patient Instructions   Looks great.  Blood pressure is well-controlled.  Cholesterol is wonderful.  Kidney function stable.  Discussion of her thyroid test which is borderline.  Begin levothyroxine 25 mcg once daily.  Recheck thyroid studies in about 4 months before next visit although she might need to be seen sooner if she is getting cataracts out.  Recheck in 4 months.      History of Present Illness     HPI  Here for follow-up of hypertension, hyperlipidemia, prediabetes, and stage III chronic kidney disease.  And palpitations.  Doing well.  Palpitations seem to have resolved.  A goofy sensation she had in her belly went away as well.  Did use some Pepcid AC.  Had blood work done on .  Cholesterol wonderful.  A1c 6.2.  Kidney function normal.  Thyroid borderline.  T4 77 but TSH 6.9.  GFR 47.    Review of Systems    Past Medical History:   Diagnosis Date   • Acquired hypothyroidism 2025     3/11/2025-TSH 6.9 with a T4 of 77     • Arthritis 1995    Chronic problem   • Diverticulosis 07/24/2018   • Family history of breast cancer in mother 02/27/2023   • GERD with esophagitis 07/24/2018   • Glaucoma 07/24/2018   • Hearing loss 07/24/2018    Wears bilateral hearing aids   • Hyperlipidemia 07/24/2018   • Hypertension    • Prediabetes 06/26/2023   • Stage 3a chronic kidney disease (HCC) 02/27/2023     Past Surgical History:   Procedure Laterality Date   • ABDOMINAL AORTIC ANEURYSM REPAIR Right 2007    Femoral artery-Dr. Herrera   • TUBAL LIGATION Bilateral      Social History     Social History Narrative     since 1976.    Lives alone.  Has a boyfriend for 20 years.  He has his own place. Zac is 11 years younger.    1 child.  2 grandchildren.  One great grandchild.  In [facility].    Enjoys reading, going out for dinner.    Had worked at [employer].    Enjoys the [employer] channel.     Current Outpatient Medications on File Prior to Visit   Medication Sig   • Acetaminophen (TYLENOL ARTHRITIS EXT RELIEF PO) Take by mouth   • ASPERCREME LIDOCAINE EX Apply topically   • bimatoprost (LUMIGAN) 0.01 % ophthalmic drops Apply 1 drop to eye Daily   • brimonidine (ALPHAGAN P) 0.1 % Apply 1 drop to eye every 12 (twelve) hours   • Calcium Carbonate-Vitamin D (CALTRATE 600+D PO) Take 2 tablets by mouth daily    • cholecalciferol (VITAMIN D3) 1,000 units tablet Take 1,000 Units by mouth daily   • ezetimibe (ZETIA) 10 mg tablet Take 1 tablet (10 mg total) by mouth daily   • Multiple Vitamins-Minerals (CENTRUM SILVER ULTRA WOMENS PO) Take 1 tablet by mouth daily   • olmesartan (Benicar) 20 mg tablet Take 1 tablet (20 mg total) by mouth daily   • rosuvastatin (CRESTOR) 10 MG tablet Take 1 tablet (10 mg total) by mouth daily   • [DISCONTINUED] diltiazem (CARDIZEM CD) 240 mg 24 hr capsule Take 1 capsule (240 mg total) by mouth daily   • [DISCONTINUED] ipratropium (ATROVENT) 0.06 % nasal spray 2 sprays into  "each nostril 4 (four) times a day (Patient not taking: Reported on 3/27/2025)     Allergies   Allergen Reactions   • Lansoprazole Diarrhea   • Naproxen    • Nsaids      Other reaction(s): renal dysfunction  Category: Adverse Reaction;    • Sulfa Antibiotics Hives   • Trimethoprim    • Amoxicillin Fever and Rash     Category: Allergy;      Immunization History   Administered Date(s) Administered   • COVID-19 MODERNA VACC 0.5 ML IM 01/21/2021, 02/18/2021, 11/29/2021, 06/02/2022   • COVID-19 Moderna mRNA Vaccine 12 Yr+ 50 mcg/0.5 mL (Spikevax) 01/23/2024   • Fluzone Split Quad 0.25 mL 09/22/2016   • INFLUENZA 10/13/2015, 09/22/2016, 09/14/2017, 10/25/2018, 11/01/2022   • Influenza Split 10/17/2013   • Influenza Split High Dose Preservative Free IM 09/14/2017, 11/18/2024   • Influenza, high dose seasonal 0.7 mL 10/25/2018, 10/02/2019, 10/14/2020, 10/21/2021, 10/23/2023   • Influenza, seasonal, injectable 09/23/2011, 10/29/2014   • Pneumococcal Conjugate 13-Valent 09/14/2017   • Pneumococcal Conjugate Vaccine 20-valent (Pcv20), Polysace 07/28/2022   • Pneumococcal Polysaccharide PPV23 11/18/2015   • Tdap 08/05/2019     Objective   /72 (BP Location: Left arm, Patient Position: Sitting, Cuff Size: Standard)   Pulse 84   Temp 97.9 °F (36.6 °C) (Temporal)   Ht 5' 6\" (1.676 m)   Wt 75.1 kg (165 lb 9.6 oz)   SpO2 97%   BMI 26.73 kg/m²     Physical Exam  Appears well.  Lungs are clear.  Heart regular with a 2/6 systolic murmur.  Abdomen nontender.  Bilateral cataracts are noted.  Blood work as above.    "

## 2025-03-27 NOTE — ASSESSMENT & PLAN NOTE
Lab Results   Component Value Date    EGFR 47 03/11/2025    EGFR 61 01/23/2024    EGFR 51 (L) 10/25/2022    CREATININE 1.09 03/11/2025    CREATININE 0.93 01/23/2024    CREATININE 1.09 10/25/2022

## 2025-03-27 NOTE — ASSESSMENT & PLAN NOTE
Orders:  •  levothyroxine 25 mcg tablet; Take 1 tablet (25 mcg total) by mouth daily in the early morning  •  TSH, 3rd generation; Future  •  T4, free; Future

## 2025-03-27 NOTE — PATIENT INSTRUCTIONS
Looks great.  Blood pressure is well-controlled.  Cholesterol is wonderful.  Kidney function stable.  Discussion of her thyroid test which is borderline.  Begin levothyroxine 25 mcg once daily.  Recheck thyroid studies in about 4 months before next visit although she might need to be seen sooner if she is getting cataracts out.  Recheck in 4 months.

## 2025-05-19 ENCOUNTER — CONSULT (OUTPATIENT)
Dept: FAMILY MEDICINE CLINIC | Facility: CLINIC | Age: 85
End: 2025-05-19
Payer: MEDICARE

## 2025-05-19 VITALS
HEIGHT: 66 IN | WEIGHT: 165.8 LBS | BODY MASS INDEX: 26.65 KG/M2 | DIASTOLIC BLOOD PRESSURE: 76 MMHG | HEART RATE: 72 BPM | SYSTOLIC BLOOD PRESSURE: 134 MMHG | OXYGEN SATURATION: 94 %

## 2025-05-19 DIAGNOSIS — Z01.818 PRE-OP EXAMINATION: Primary | ICD-10-CM

## 2025-05-19 DIAGNOSIS — N18.31 STAGE 3A CHRONIC KIDNEY DISEASE (HCC): ICD-10-CM

## 2025-05-19 DIAGNOSIS — H40.9 GLAUCOMA, UNSPECIFIED GLAUCOMA TYPE, UNSPECIFIED LATERALITY: ICD-10-CM

## 2025-05-19 DIAGNOSIS — I10 ESSENTIAL HYPERTENSION: ICD-10-CM

## 2025-05-19 DIAGNOSIS — R73.03 PREDIABETES: ICD-10-CM

## 2025-05-19 DIAGNOSIS — E78.00 PURE HYPERCHOLESTEROLEMIA: ICD-10-CM

## 2025-05-19 DIAGNOSIS — E03.9 ACQUIRED HYPOTHYROIDISM: ICD-10-CM

## 2025-05-19 DIAGNOSIS — H25.9 AGE-RELATED CATARACT OF BOTH EYES, UNSPECIFIED AGE-RELATED CATARACT TYPE: ICD-10-CM

## 2025-05-19 PROCEDURE — 99214 OFFICE O/P EST MOD 30 MIN: CPT | Performed by: FAMILY MEDICINE

## 2025-05-19 PROCEDURE — G2211 COMPLEX E/M VISIT ADD ON: HCPCS | Performed by: FAMILY MEDICINE

## 2025-05-19 NOTE — PATIENT INSTRUCTIONS
Patient is an acceptable candidate for anticipated cataract surgery.  Note sent to Dr. Dobbins..  Follow-up as scheduled.

## 2025-05-19 NOTE — PROGRESS NOTES
Name: Cary Laws      : 1940      MRN: [id number]  Encounter Provider: Cuba Silver MD  Encounter Date: 2025   Encounter department: [facility]    Assessment & Plan  Pre-op examination         Age-related cataract of both eyes, unspecified age-related cataract type         Essential hypertension         Stage 3a chronic kidney disease (HCC)  Lab Results   Component Value Date    EGFR 47 2025    EGFR 61 2024    EGFR 51 (L) 10/25/2022    CREATININE 1.09 2025    CREATININE 0.93 2024    CREATININE 1.09 10/25/2022            Prediabetes         Pure hypercholesterolemia         Acquired hypothyroidism         Glaucoma, unspecified glaucoma type, unspecified laterality            Patient Instructions   Patient is an acceptable candidate for anticipated cataract surgery.  Note sent to Dr. Dobbins..  Follow-up as scheduled.      History of Present Illness     HPI  Here for preoperative exam at the request of Dr. Dobbins.  Scheduled to have removal of her right cataract done on Tere 3.  Also treatment for glaucoma.  Possible second surgery on the other eye in July.  Past medical history as noted below significant for hypertension, hypothyroidism, hyperlipidemia, stage III chronic kidney disease, and prediabetes.  History is negative for coronary artery disease, stroke, and cancer.  Medications are noted below.  Last A1c in March was 6.2.  GFR 47.  Allergies include sulfa antibiotics, amoxicillin, lansoprazole, naproxen and other NSAIDs.  Non-smoker.  Social alcohol.    Review of Systems    Past Medical History:   Diagnosis Date   • Acquired hypothyroidism 2025    3/11/2025-TSH 6.9 with a T4 of 77     • Chronic kidney disease     GFR is low   • Diverticulitis of colon    • Family history of breast cancer in mother 2023   • GERD with esophagitis 2018   • Glaucoma 2018   • Hearing loss 2018    Wears bilateral hearing aids   •  Hyperlipidemia 07/24/2018   • Hypertension    • Osteoarthritis 2015    went to [facility]   • Polycystic kidney disease 2020    kidney cysts   • Prediabetes 06/26/2023   • Stage 3a chronic kidney disease (HCC) 02/27/2023   • Vascular disorder 2007    EVLT on right leg for an aneurysm     Past Surgical History:   Procedure Laterality Date   • ABDOMINAL AORTIC ANEURYSM REPAIR Right 2007    Femoral artery-Dr. Herrera   • CYSTOSCOPY  3/13/20    Dr. Vázquez   • TUBAL LIGATION Bilateral      Social History     Social History Narrative     since 1976.    Lives alone.  Has a boyfriend for 20 years.  He has his own place. Zac is 11 years younger.    1 child.  2 grandchildren.  One great grandchild.  In [facility].    Enjoys reading, going out for dinner.    Had worked at [employer].    Enjoys the [employer] channel.     Medications[1]  Allergies   Allergen Reactions   • Lansoprazole Diarrhea   • Naproxen    • Nsaids      Other reaction(s): renal dysfunction  Category: Adverse Reaction;    • Sulfa Antibiotics Hives   • Trimethoprim    • Amoxicillin Fever and Rash     Category: Allergy;      Immunization History   Administered Date(s) Administered   • COVID-19 MODERNA VACC 0.5 ML IM 01/21/2021, 02/18/2021, 11/29/2021, 06/02/2022   • COVID-19 Moderna mRNA Vaccine 12 Yr+ 50 mcg/0.5 mL (Spikevax) 01/23/2024   • Fluzone Split Quad 0.25 mL 09/22/2016   • INFLUENZA 10/13/2015, 09/22/2016, 09/14/2017, 10/25/2018, 11/01/2022   • Influenza Split 10/17/2013   • Influenza Split High Dose Preservative Free IM 09/14/2017, 11/18/2024   • Influenza, high dose seasonal 0.7 mL 10/25/2018, 10/02/2019, 10/14/2020, 10/21/2021, 10/23/2023   • Influenza, seasonal, injectable 09/23/2011, 10/29/2014   • Pneumococcal Conjugate 13-Valent 09/14/2017   • Pneumococcal Conjugate Vaccine 20-valent (Pcv20), Polysace 07/28/2022   • Pneumococcal Polysaccharide PPV23 11/18/2015   • Tdap 08/05/2019     Objective   /76 (BP Location: Left  "arm, Patient Position: Sitting, Cuff Size: Adult)   Pulse 72   Ht 5' 6\" (1.676 m)   Wt 75.2 kg (165 lb 12.8 oz)   SpO2 94%   BMI 26.76 kg/m²     Physical Exam  No acute distress.  Bilateral cataracts noted.  No neck nodes or thyromegaly.  Lungs are clear.  Heart regular with a 2/6 systolic murmur.  Abdomen soft and nontender.  No edema.  Mood is upbeat.  Affect appropriate.           [1]  Current Outpatient Medications on File Prior to Visit   Medication Sig   • Acetaminophen (TYLENOL ARTHRITIS EXT RELIEF PO) Take by mouth   • ASPERCREME LIDOCAINE EX Apply topically   • bimatoprost (LUMIGAN) 0.01 % ophthalmic drops Apply 1 drop to eye in the morning.   • brimonidine (ALPHAGAN P) 0.1 % Apply 1 drop to eye every 12 (twelve) hours   • Calcium Carbonate-Vitamin D (CALTRATE 600+D PO) Take 2 tablets by mouth in the morning.   • cholecalciferol (VITAMIN D3) 1,000 units tablet Take 1,000 Units by mouth in the morning.   • diltiazem (CARDIZEM CD) 240 mg 24 hr capsule Take 1 capsule (240 mg total) by mouth daily   • ezetimibe (ZETIA) 10 mg tablet Take 1 tablet (10 mg total) by mouth daily   • levothyroxine 25 mcg tablet Take 1 tablet (25 mcg total) by mouth daily in the early morning   • Multiple Vitamins-Minerals (CENTRUM SILVER ULTRA WOMENS PO) Take 1 tablet by mouth in the morning.   • olmesartan (Benicar) 20 mg tablet Take 1 tablet (20 mg total) by mouth daily   • rosuvastatin (CRESTOR) 10 MG tablet Take 1 tablet (10 mg total) by mouth daily   "

## 2025-05-20 ENCOUNTER — TELEPHONE (OUTPATIENT)
Age: 85
End: 2025-05-20

## 2025-05-20 NOTE — TELEPHONE ENCOUNTER
Char with [facility] called in and stated that they didn't receive the notes from the office visit for her cataract clearance.      Please fax office notes Attn Char .    Thank you

## 2025-07-07 ENCOUNTER — CONSULT (OUTPATIENT)
Dept: FAMILY MEDICINE CLINIC | Facility: CLINIC | Age: 85
End: 2025-07-07
Payer: MEDICARE

## 2025-07-07 VITALS
HEIGHT: 66 IN | SYSTOLIC BLOOD PRESSURE: 150 MMHG | WEIGHT: 166.4 LBS | TEMPERATURE: 97.9 F | OXYGEN SATURATION: 97 % | BODY MASS INDEX: 26.74 KG/M2 | HEART RATE: 80 BPM | DIASTOLIC BLOOD PRESSURE: 84 MMHG

## 2025-07-07 DIAGNOSIS — I10 ESSENTIAL HYPERTENSION: ICD-10-CM

## 2025-07-07 DIAGNOSIS — H40.9 GLAUCOMA, UNSPECIFIED GLAUCOMA TYPE, UNSPECIFIED LATERALITY: ICD-10-CM

## 2025-07-07 DIAGNOSIS — Z01.818 PRE-OP EXAMINATION: Primary | ICD-10-CM

## 2025-07-07 DIAGNOSIS — E03.9 ACQUIRED HYPOTHYROIDISM: ICD-10-CM

## 2025-07-07 DIAGNOSIS — H25.9 SENILE CATARACT OF LEFT EYE, UNSPECIFIED AGE-RELATED CATARACT TYPE: ICD-10-CM

## 2025-07-07 DIAGNOSIS — N18.31 STAGE 3A CHRONIC KIDNEY DISEASE (HCC): ICD-10-CM

## 2025-07-07 DIAGNOSIS — H90.3 SENSORINEURAL HEARING LOSS (SNHL) OF BOTH EARS: ICD-10-CM

## 2025-07-07 PROCEDURE — 99214 OFFICE O/P EST MOD 30 MIN: CPT | Performed by: FAMILY MEDICINE

## 2025-07-07 PROCEDURE — G2211 COMPLEX E/M VISIT ADD ON: HCPCS | Performed by: FAMILY MEDICINE

## 2025-07-07 RX ORDER — MOXIFLOXACIN 5 MG/ML
SOLUTION/ DROPS OPHTHALMIC
COMMUNITY
Start: 2025-07-01

## 2025-07-07 RX ORDER — PREDNISOLONE ACETATE 10 MG/ML
SUSPENSION/ DROPS OPHTHALMIC
COMMUNITY
Start: 2025-07-01

## 2025-07-07 RX ORDER — KETOROLAC TROMETHAMINE 5 MG/ML
SOLUTION OPHTHALMIC
COMMUNITY
Start: 2025-07-01

## 2025-07-07 RX ORDER — TOBRAMYCIN/DEXAMETHASONE 0.3 %-0.1%
OINTMENT (GRAM) OPHTHALMIC (EYE)
COMMUNITY
Start: 2025-07-01

## 2025-07-07 NOTE — PROGRESS NOTES
Name: Cary Laws      : 1940      MRN: [id number]  Encounter Provider: Cuba Silver MD  Encounter Date: 2025   Encounter department: [facility]    Assessment & Plan  Pre-op examination         Senile cataract of left eye, unspecified age-related cataract type         Essential hypertension         Sensorineural hearing loss (SNHL) of both ears         Stage 3a chronic kidney disease (HCC)  Lab Results   Component Value Date    EGFR 47 2025    EGFR 61 2024    EGFR 51 (L) 10/25/2022    CREATININE 1.09 2025    CREATININE 0.93 2024    CREATININE 1.09 10/25/2022            Acquired hypothyroidism         Glaucoma, unspecified glaucoma type, unspecified laterality            Patient Instructions   She is an acceptable candidate for anticipated cataract surgery.  Blood pressure high today in the office but readings are okay at home.  Note sent to Dr. Dobbins  Reschedule follow-up appointment with me for 3 months.      History of Present Illness     HPI  Here for preoperative exam at the request of Dr. Dobbins.  Scheduled to have removal of her left cataract done on July 15..  Also treatment for glaucoma.  Possibly her trabeculectomy.  Previously had surgery on her right eye for the same.  Past medical history as noted below significant for hypertension, hypothyroidism, hyperlipidemia, stage III chronic kidney disease, and prediabetes.  History is negative for coronary artery disease, stroke, and cancer.  Medications are noted below.  Last A1c in March was 6.2.  GFR 47.  Allergies include sulfa antibiotics, amoxicillin, lansoprazole, naproxen and other NSAIDs.  Non-smoker.  Social alcohol.    Review of Systems  No chest pain or shortness of breath.  Bowels are okay.  Bladder okay.    Past Medical History[1]  Past Surgical History[2]  Social History     Social History Narrative     since .    Lives alone.  Has a boyfriend for 20 years.  He has his own  "place. Zac is 11 years younger.    1 child.  2 grandchildren.  One great grandchild.  In [facility].    Enjoys reading, going out for dinner.    Had worked at [employer].    Enjoys the [employer].     Medications[3]  Allergies   Allergen Reactions   • Lansoprazole Diarrhea   • Naproxen    • Nsaids      Other reaction(s): renal dysfunction  Category: Adverse Reaction;    • Sulfa Antibiotics Hives   • Trimethoprim    • Amoxicillin Fever and Rash     Category: Allergy;      Immunization History   Administered Date(s) Administered   • COVID-19 MODERNA VACC 0.5 ML IM 01/21/2021, 02/18/2021, 11/29/2021, 06/02/2022   • COVID-19 Moderna mRNA Vaccine 12 Yr+ 50 mcg/0.5 mL (Spikevax) 01/23/2024   • Fluzone Split Quad 0.25 mL 09/22/2016   • INFLUENZA 10/13/2015, 09/22/2016, 09/14/2017, 10/25/2018, 11/01/2022   • Influenza Split 10/17/2013   • Influenza Split High Dose Preservative Free IM 09/14/2017, 11/18/2024   • Influenza, high dose seasonal 0.7 mL 10/25/2018, 10/02/2019, 10/14/2020, 10/21/2021, 10/23/2023   • Influenza, seasonal, injectable 09/23/2011, 10/29/2014   • Pneumococcal Conjugate 13-Valent 09/14/2017   • Pneumococcal Conjugate Vaccine 20-valent (Pcv20), Polysace 07/28/2022   • Pneumococcal Polysaccharide PPV23 11/18/2015   • Tdap 08/05/2019     Objective   /84 (BP Location: Left arm, Patient Position: Sitting, Cuff Size: Standard)   Pulse 80   Temp 97.9 °F (36.6 °C) (Temporal)   Ht 5' 6\" (1.676 m)   Wt 75.5 kg (166 lb 6.4 oz)   SpO2 97%   BMI 26.86 kg/m²     Physical Exam  Appears well.  Repeat blood pressure 180/84.  Blood pressure readings at home are good.  Lungs are clear.  Heart regular with a 2/6 systolic murmur.  Abdomen soft and nontender.  No edema.  Mood is okay.  Affect appropriate.           [1]  Past Medical History:  Diagnosis Date   • Acquired hypothyroidism 03/27/2025    3/11/2025-TSH 6.9 with a T4 of 77     • Chronic kidney disease 2020    GFR is low   • Diverticulitis " of colon    • Family history of breast cancer in mother 02/27/2023   • GERD with esophagitis 07/24/2018   • Glaucoma 07/24/2018   • Hearing loss 07/24/2018    Wears bilateral hearing aids   • Hyperlipidemia 07/24/2018   • Hypertension    • Osteoarthritis 2015    went to [facility]   • Polycystic kidney disease 2020    kidney cysts   • Prediabetes 06/26/2023   • Stage 3a chronic kidney disease (HCC) 02/27/2023   • Vascular disorder 2007    EVLT on right leg for an aneurysm   [2]  Past Surgical History:  Procedure Laterality Date   • ABDOMINAL AORTIC ANEURYSM REPAIR Right 2007    Femoral artery-Dr. Herrera   • CYSTOSCOPY  3/13/20    Dr. Vázquez   • EYE SURGERY Bilateral     Right cataract 6/7/2025, left cataract 7/15/2025, bilateral trabeculectomy-Dr. Dobbins   • TUBAL LIGATION Bilateral    [3]  Current Outpatient Medications on File Prior to Visit   Medication Sig   • Acetaminophen (TYLENOL ARTHRITIS EXT RELIEF PO) Take by mouth   • ASPERCREME LIDOCAINE EX Apply topically   • bimatoprost (LUMIGAN) 0.01 % ophthalmic drops Apply 1 drop to eye in the morning.   • brimonidine (ALPHAGAN P) 0.1 % Apply 1 drop to eye every 12 (twelve) hours   • Calcium Carbonate-Vitamin D (CALTRATE 600+D PO) Take 2 tablets by mouth in the morning.   • cholecalciferol (VITAMIN D3) 1,000 units tablet Take 1,000 Units by mouth in the morning.   • diltiazem (CARDIZEM CD) 240 mg 24 hr capsule Take 1 capsule (240 mg total) by mouth daily   • ezetimibe (ZETIA) 10 mg tablet Take 1 tablet (10 mg total) by mouth daily   • ketorolac (ACULAR) 0.5 % ophthalmic solution    • levothyroxine 25 mcg tablet Take 1 tablet (25 mcg total) by mouth daily in the early morning   • moxifloxacin (VIGAMOX) 0.5 % ophthalmic solution    • Multiple Vitamins-Minerals (CENTRUM SILVER ULTRA WOMENS PO) Take 1 tablet by mouth in the morning.   • olmesartan (Benicar) 20 mg tablet Take 1 tablet (20 mg total) by mouth daily   • prednisoLONE acetate (PRED FORTE) 1 % ophthalmic  suspension    • rosuvastatin (CRESTOR) 10 MG tablet Take 1 tablet (10 mg total) by mouth daily   • TobraDex ophthalmic ointment

## 2025-07-07 NOTE — PATIENT INSTRUCTIONS
She is an acceptable candidate for anticipated cataract surgery.  Blood pressure high today in the office but readings are okay at home.  Note sent to Dr. Dobbins  Reschedule follow-up appointment with me for 3 months.

## 2025-08-04 ENCOUNTER — APPOINTMENT (OUTPATIENT)
Dept: LAB | Age: 85
End: 2025-08-04
Payer: MEDICARE

## 2025-08-04 LAB
T4 FREE SERPL-MCNC: 0.86 NG/DL (ref 0.61–1.12)
TSH SERPL DL<=0.05 MIU/L-ACNC: 3.55 UIU/ML (ref 0.45–4.5)